# Patient Record
Sex: MALE | Race: WHITE | NOT HISPANIC OR LATINO | Employment: OTHER | URBAN - METROPOLITAN AREA
[De-identification: names, ages, dates, MRNs, and addresses within clinical notes are randomized per-mention and may not be internally consistent; named-entity substitution may affect disease eponyms.]

---

## 2023-03-06 PROBLEM — R26.2 DIFFICULTY IN WALKING: Status: ACTIVE | Noted: 2023-03-06

## 2023-03-06 PROBLEM — M76.70 PERONEAL TENDONITIS: Status: ACTIVE | Noted: 2023-03-06

## 2023-03-06 PROBLEM — M47.816 LUMBAR SPONDYLOSIS: Status: ACTIVE | Noted: 2023-03-06

## 2023-03-06 PROBLEM — E55.9 VITAMIN D DEFICIENCY: Status: ACTIVE | Noted: 2023-03-06

## 2023-03-06 PROBLEM — M25.561 RIGHT KNEE PAIN: Status: ACTIVE | Noted: 2023-03-06

## 2023-03-06 PROBLEM — M79.606 LOWER EXTREMITY PAIN: Status: ACTIVE | Noted: 2023-03-06

## 2023-03-06 PROBLEM — M62.81 MUSCLE WEAKNESS-GENERAL: Status: ACTIVE | Noted: 2023-03-06

## 2023-03-06 PROBLEM — M17.11 PRIMARY LOCALIZED OSTEOARTHRITIS OF RIGHT KNEE: Status: ACTIVE | Noted: 2023-03-06

## 2023-03-06 PROBLEM — N40.0 BPH (BENIGN PROSTATIC HYPERPLASIA): Status: ACTIVE | Noted: 2023-03-06

## 2023-03-06 PROBLEM — I87.2 VENOUS INSUFFICIENCY: Status: ACTIVE | Noted: 2023-03-06

## 2023-03-06 PROBLEM — R26.89 SHUFFLING GAIT: Status: ACTIVE | Noted: 2023-03-06

## 2023-03-06 PROBLEM — K59.09 CONSTIPATION, CHRONIC: Status: ACTIVE | Noted: 2023-03-06

## 2023-03-06 PROBLEM — M21.6X9: Status: ACTIVE | Noted: 2023-03-06

## 2023-03-06 PROBLEM — I10 BENIGN HYPERTENSION: Status: ACTIVE | Noted: 2023-03-06

## 2023-03-06 PROBLEM — I73.9 VASCULAR CLAUDICATION (CMS-HCC): Status: ACTIVE | Noted: 2023-03-06

## 2023-03-06 PROBLEM — M16.11 OSTEOARTHRITIS OF RIGHT HIP: Status: ACTIVE | Noted: 2023-03-06

## 2023-03-06 PROBLEM — G89.29 CHRONIC LOW BACK PAIN: Status: ACTIVE | Noted: 2023-03-06

## 2023-03-06 PROBLEM — H61.22 IMPACTED CERUMEN OF LEFT EAR: Status: ACTIVE | Noted: 2023-03-06

## 2023-03-06 PROBLEM — M51.369 LUMBAR DEGENERATIVE DISC DISEASE: Status: ACTIVE | Noted: 2023-03-06

## 2023-03-06 PROBLEM — R26.89 IMPAIRED GAIT AND MOBILITY: Status: ACTIVE | Noted: 2023-03-06

## 2023-03-06 PROBLEM — R29.3 STOOPED POSTURE: Status: ACTIVE | Noted: 2023-03-06

## 2023-03-06 PROBLEM — M96.1 POSTLAMINECTOMY SYNDROME, LUMBAR REGION: Status: ACTIVE | Noted: 2023-03-06

## 2023-03-06 PROBLEM — J18.9 COMMUNITY ACQUIRED PNEUMONIA: Status: ACTIVE | Noted: 2023-03-06

## 2023-03-06 PROBLEM — R29.898 RIGIDITY (MUSCLES): Status: ACTIVE | Noted: 2023-03-06

## 2023-03-06 PROBLEM — M53.3 SACROILIAC JOINT PAIN: Status: ACTIVE | Noted: 2023-03-06

## 2023-03-06 PROBLEM — R60.0 BILATERAL EDEMA OF LOWER EXTREMITY: Status: ACTIVE | Noted: 2023-03-06

## 2023-03-06 PROBLEM — M25.552 LEFT HIP PAIN: Status: ACTIVE | Noted: 2023-03-06

## 2023-03-06 PROBLEM — G62.9 AXONAL POLYNEUROPATHY: Status: ACTIVE | Noted: 2023-03-06

## 2023-03-06 PROBLEM — M25.551 RIGHT HIP PAIN: Status: ACTIVE | Noted: 2023-03-06

## 2023-03-06 PROBLEM — M67.00 ACQUIRED SHORT ACHILLES TENDON: Status: ACTIVE | Noted: 2023-03-06

## 2023-03-06 PROBLEM — M54.50 CHRONIC LOW BACK PAIN: Status: ACTIVE | Noted: 2023-03-06

## 2023-03-06 PROBLEM — G20.A1 PARKINSON'S DISEASE (MULTI): Status: ACTIVE | Noted: 2023-03-06

## 2023-03-06 PROBLEM — G62.9 PERIPHERAL NEUROPATHY: Status: ACTIVE | Noted: 2023-03-06

## 2023-03-06 PROBLEM — M47.817 LUMBAR AND SACRAL SPONDYLOARTHRITIS: Status: ACTIVE | Noted: 2023-03-06

## 2023-03-06 PROBLEM — M48.062 LUMBAR STENOSIS WITH NEUROGENIC CLAUDICATION: Status: ACTIVE | Noted: 2023-03-06

## 2023-03-06 PROBLEM — M79.18 MYOFASCIAL PAIN: Status: ACTIVE | Noted: 2023-03-06

## 2023-03-06 PROBLEM — M54.16 LUMBAR RADICULOPATHY: Status: ACTIVE | Noted: 2023-03-06

## 2023-03-06 PROBLEM — M17.9 KNEE OSTEOARTHRITIS: Status: ACTIVE | Noted: 2023-03-06

## 2023-03-06 PROBLEM — M21.372 LEFT FOOT DROP: Status: ACTIVE | Noted: 2023-03-06

## 2023-03-06 PROBLEM — M51.36 LUMBAR DEGENERATIVE DISC DISEASE: Status: ACTIVE | Noted: 2023-03-06

## 2023-03-06 PROBLEM — R25.1 TREMOR: Status: ACTIVE | Noted: 2023-03-06

## 2023-03-06 PROBLEM — M25.562 LEFT KNEE PAIN: Status: ACTIVE | Noted: 2023-03-06

## 2023-03-06 PROBLEM — M54.50 LUMBAR PAIN: Status: ACTIVE | Noted: 2023-03-06

## 2023-03-06 PROBLEM — K46.9 ABDOMINAL HERNIA: Status: ACTIVE | Noted: 2023-03-06

## 2023-03-06 PROBLEM — M20.20 HALLUX RIGIDUS: Status: ACTIVE | Noted: 2023-03-06

## 2023-03-06 PROBLEM — R26.81 GAIT INSTABILITY: Status: ACTIVE | Noted: 2023-03-06

## 2023-03-06 PROBLEM — M48.07 LUMBOSACRAL SPINAL STENOSIS: Status: ACTIVE | Noted: 2023-03-06

## 2023-03-06 RX ORDER — CARBIDOPA AND LEVODOPA 25; 100 MG/1; MG/1
2 TABLET ORAL
COMMUNITY
Start: 2020-09-22 | End: 2024-06-10

## 2023-03-06 RX ORDER — BISACODYL 5 MG
TABLET, DELAYED RELEASE (ENTERIC COATED) ORAL
COMMUNITY

## 2023-03-06 RX ORDER — HYDROCHLOROTHIAZIDE 25 MG/1
25 TABLET ORAL DAILY
COMMUNITY
Start: 2019-09-13 | End: 2023-06-20

## 2023-03-06 RX ORDER — AMLODIPINE BESYLATE 5 MG/1
5 TABLET ORAL DAILY
COMMUNITY
Start: 2020-02-25 | End: 2023-06-01

## 2023-03-20 ENCOUNTER — OFFICE VISIT (OUTPATIENT)
Dept: PRIMARY CARE | Facility: CLINIC | Age: 73
End: 2023-03-20
Payer: MEDICARE

## 2023-03-20 VITALS
WEIGHT: 154 LBS | HEART RATE: 75 BPM | DIASTOLIC BLOOD PRESSURE: 66 MMHG | BODY MASS INDEX: 22.81 KG/M2 | HEIGHT: 69 IN | SYSTOLIC BLOOD PRESSURE: 119 MMHG

## 2023-03-20 DIAGNOSIS — G20.A1 PARKINSON'S DISEASE (MULTI): ICD-10-CM

## 2023-03-20 DIAGNOSIS — M62.81 MUSCLE WEAKNESS-GENERAL: ICD-10-CM

## 2023-03-20 DIAGNOSIS — I10 BENIGN HYPERTENSION: ICD-10-CM

## 2023-03-20 DIAGNOSIS — Z11.4 SCREENING FOR HIV WITHOUT PRESENCE OF RISK FACTORS: ICD-10-CM

## 2023-03-20 DIAGNOSIS — Z11.59 NEED FOR HEPATITIS C SCREENING TEST: ICD-10-CM

## 2023-03-20 DIAGNOSIS — Z12.5 SCREENING FOR PROSTATE CANCER: ICD-10-CM

## 2023-03-20 DIAGNOSIS — Z13.1 DIABETES MELLITUS SCREENING: Primary | ICD-10-CM

## 2023-03-20 DIAGNOSIS — Z12.12 SCREENING FOR COLORECTAL CANCER: ICD-10-CM

## 2023-03-20 DIAGNOSIS — Z00.00 ROUTINE GENERAL MEDICAL EXAMINATION AT HEALTH CARE FACILITY: ICD-10-CM

## 2023-03-20 DIAGNOSIS — N40.0 BENIGN PROSTATIC HYPERPLASIA WITHOUT LOWER URINARY TRACT SYMPTOMS: ICD-10-CM

## 2023-03-20 DIAGNOSIS — M96.1 POSTLAMINECTOMY SYNDROME, LUMBAR REGION: ICD-10-CM

## 2023-03-20 DIAGNOSIS — I10 PRIMARY HYPERTENSION: ICD-10-CM

## 2023-03-20 DIAGNOSIS — R97.20 ELEVATED PSA: ICD-10-CM

## 2023-03-20 DIAGNOSIS — Z12.11 SCREENING FOR COLORECTAL CANCER: ICD-10-CM

## 2023-03-20 PROBLEM — M25.552 LEFT HIP PAIN: Status: RESOLVED | Noted: 2023-03-06 | Resolved: 2023-03-20

## 2023-03-20 PROBLEM — J18.9 COMMUNITY ACQUIRED PNEUMONIA: Status: RESOLVED | Noted: 2023-03-06 | Resolved: 2023-03-20

## 2023-03-20 PROBLEM — M53.3 SACROILIAC JOINT PAIN: Status: RESOLVED | Noted: 2023-03-06 | Resolved: 2023-03-20

## 2023-03-20 PROBLEM — M47.816 LUMBAR SPONDYLOSIS: Status: RESOLVED | Noted: 2023-03-06 | Resolved: 2023-03-20

## 2023-03-20 PROBLEM — R26.89 IMPAIRED GAIT AND MOBILITY: Status: RESOLVED | Noted: 2023-03-06 | Resolved: 2023-03-20

## 2023-03-20 PROBLEM — G62.9 PERIPHERAL NEUROPATHY: Status: RESOLVED | Noted: 2023-03-06 | Resolved: 2023-03-20

## 2023-03-20 PROBLEM — M21.6X9: Status: RESOLVED | Noted: 2023-03-06 | Resolved: 2023-03-20

## 2023-03-20 PROBLEM — R29.3 STOOPED POSTURE: Status: RESOLVED | Noted: 2023-03-06 | Resolved: 2023-03-20

## 2023-03-20 PROBLEM — M20.20 HALLUX RIGIDUS: Status: RESOLVED | Noted: 2023-03-06 | Resolved: 2023-03-20

## 2023-03-20 PROBLEM — M54.16 RIGHT LUMBAR RADICULOPATHY: Status: RESOLVED | Noted: 2023-03-06 | Resolved: 2023-03-20

## 2023-03-20 PROBLEM — H61.22 IMPACTED CERUMEN OF LEFT EAR: Status: RESOLVED | Noted: 2023-03-06 | Resolved: 2023-03-20

## 2023-03-20 PROBLEM — R26.2 DIFFICULTY IN WALKING: Status: RESOLVED | Noted: 2023-03-06 | Resolved: 2023-03-20

## 2023-03-20 PROBLEM — M79.18 MYOFASCIAL PAIN: Status: RESOLVED | Noted: 2023-03-06 | Resolved: 2023-03-20

## 2023-03-20 PROBLEM — R29.898 RIGIDITY (MUSCLES): Status: RESOLVED | Noted: 2023-03-06 | Resolved: 2023-03-20

## 2023-03-20 PROBLEM — M16.11 OSTEOARTHRITIS OF RIGHT HIP: Status: RESOLVED | Noted: 2023-03-06 | Resolved: 2023-03-20

## 2023-03-20 PROBLEM — M25.561 RIGHT KNEE PAIN: Status: RESOLVED | Noted: 2023-03-06 | Resolved: 2023-03-20

## 2023-03-20 PROBLEM — M67.00 ACQUIRED SHORT ACHILLES TENDON: Status: RESOLVED | Noted: 2023-03-06 | Resolved: 2023-03-20

## 2023-03-20 PROBLEM — M54.50 LUMBAR PAIN: Status: RESOLVED | Noted: 2023-03-06 | Resolved: 2023-03-20

## 2023-03-20 PROBLEM — M17.11 PRIMARY LOCALIZED OSTEOARTHRITIS OF RIGHT KNEE: Status: RESOLVED | Noted: 2023-03-06 | Resolved: 2023-03-20

## 2023-03-20 PROBLEM — K59.09 CONSTIPATION, CHRONIC: Status: RESOLVED | Noted: 2023-03-06 | Resolved: 2023-03-20

## 2023-03-20 PROBLEM — M51.36 LUMBAR DEGENERATIVE DISC DISEASE: Status: RESOLVED | Noted: 2023-03-06 | Resolved: 2023-03-20

## 2023-03-20 PROBLEM — R60.0 BILATERAL EDEMA OF LOWER EXTREMITY: Status: RESOLVED | Noted: 2023-03-06 | Resolved: 2023-03-20

## 2023-03-20 PROBLEM — E55.9 VITAMIN D DEFICIENCY: Status: RESOLVED | Noted: 2023-03-06 | Resolved: 2023-03-20

## 2023-03-20 PROBLEM — G89.29 CHRONIC LOW BACK PAIN: Status: RESOLVED | Noted: 2023-03-06 | Resolved: 2023-03-20

## 2023-03-20 PROBLEM — I87.2 VENOUS INSUFFICIENCY: Status: RESOLVED | Noted: 2023-03-06 | Resolved: 2023-03-20

## 2023-03-20 PROBLEM — R26.89 SHUFFLING GAIT: Status: RESOLVED | Noted: 2023-03-06 | Resolved: 2023-03-20

## 2023-03-20 PROBLEM — M76.70 PERONEAL TENDONITIS: Status: RESOLVED | Noted: 2023-03-06 | Resolved: 2023-03-20

## 2023-03-20 PROBLEM — M51.369 LUMBAR DEGENERATIVE DISC DISEASE: Status: RESOLVED | Noted: 2023-03-06 | Resolved: 2023-03-20

## 2023-03-20 PROBLEM — R26.81 GAIT INSTABILITY: Status: RESOLVED | Noted: 2023-03-06 | Resolved: 2023-03-20

## 2023-03-20 PROBLEM — M47.817 LUMBAR AND SACRAL SPONDYLOARTHRITIS: Status: RESOLVED | Noted: 2023-03-06 | Resolved: 2023-03-20

## 2023-03-20 PROBLEM — M25.551 RIGHT HIP PAIN: Status: RESOLVED | Noted: 2023-03-06 | Resolved: 2023-03-20

## 2023-03-20 PROBLEM — I73.9 VASCULAR CLAUDICATION (CMS-HCC): Status: RESOLVED | Noted: 2023-03-06 | Resolved: 2023-03-20

## 2023-03-20 PROBLEM — M48.062 LUMBAR STENOSIS WITH NEUROGENIC CLAUDICATION: Status: RESOLVED | Noted: 2023-03-06 | Resolved: 2023-03-20

## 2023-03-20 PROBLEM — M79.606 LOWER EXTREMITY PAIN: Status: RESOLVED | Noted: 2023-03-06 | Resolved: 2023-03-20

## 2023-03-20 PROBLEM — M54.50 CHRONIC LOW BACK PAIN: Status: RESOLVED | Noted: 2023-03-06 | Resolved: 2023-03-20

## 2023-03-20 PROBLEM — M25.562 LEFT KNEE PAIN: Status: RESOLVED | Noted: 2023-03-06 | Resolved: 2023-03-20

## 2023-03-20 PROCEDURE — 1036F TOBACCO NON-USER: CPT | Performed by: INTERNAL MEDICINE

## 2023-03-20 PROCEDURE — G0439 PPPS, SUBSEQ VISIT: HCPCS | Performed by: INTERNAL MEDICINE

## 2023-03-20 PROCEDURE — 99397 PER PM REEVAL EST PAT 65+ YR: CPT | Performed by: INTERNAL MEDICINE

## 2023-03-20 PROCEDURE — 1160F RVW MEDS BY RX/DR IN RCRD: CPT | Performed by: INTERNAL MEDICINE

## 2023-03-20 PROCEDURE — 1170F FXNL STATUS ASSESSED: CPT | Performed by: INTERNAL MEDICINE

## 2023-03-20 PROCEDURE — 1159F MED LIST DOCD IN RCRD: CPT | Performed by: INTERNAL MEDICINE

## 2023-03-20 PROCEDURE — 3074F SYST BP LT 130 MM HG: CPT | Performed by: INTERNAL MEDICINE

## 2023-03-20 PROCEDURE — 3078F DIAST BP <80 MM HG: CPT | Performed by: INTERNAL MEDICINE

## 2023-03-20 ASSESSMENT — ENCOUNTER SYMPTOMS
ARTHRALGIAS: 0
LIGHT-HEADEDNESS: 0
FEVER: 0
AGITATION: 0
EYE PAIN: 0
NERVOUS/ANXIOUS: 0
SHORTNESS OF BREATH: 0
CHOKING: 0
FACIAL ASYMMETRY: 0
MYALGIAS: 1
DIAPHORESIS: 0
ACTIVITY CHANGE: 0
NUMBNESS: 0
NAUSEA: 0
POLYPHAGIA: 0
ABDOMINAL PAIN: 0
FACIAL SWELLING: 0
EYE ITCHING: 0
BLOOD IN STOOL: 0
FATIGUE: 1
DYSPHORIC MOOD: 0
VOMITING: 0
HYPERACTIVE: 0
ABDOMINAL DISTENTION: 0
COUGH: 0
APPETITE CHANGE: 0
JOINT SWELLING: 0
POLYDIPSIA: 0
DIFFICULTY URINATING: 0
CONFUSION: 0
HEADACHES: 0
CHEST TIGHTNESS: 0
BACK PAIN: 1
SPEECH DIFFICULTY: 0
EYE DISCHARGE: 0
EYE REDNESS: 0

## 2023-03-20 ASSESSMENT — ACTIVITIES OF DAILY LIVING (ADL)
DOING_HOUSEWORK: INDEPENDENT
BATHING: INDEPENDENT
MANAGING_FINANCES: INDEPENDENT
GROCERY_SHOPPING: INDEPENDENT
TAKING_MEDICATION: INDEPENDENT
DRESSING: INDEPENDENT

## 2023-03-20 ASSESSMENT — PATIENT HEALTH QUESTIONNAIRE - PHQ9
2. FEELING DOWN, DEPRESSED OR HOPELESS: NOT AT ALL
SUM OF ALL RESPONSES TO PHQ9 QUESTIONS 1 AND 2: 0
1. LITTLE INTEREST OR PLEASURE IN DOING THINGS: NOT AT ALL

## 2023-03-20 NOTE — ASSESSMENT & PLAN NOTE
Stable at this time with no progression.  Hernias is reducible.  Patient is deferring surgical evaluation at this time.  Explained to the patient that should he develop pain in the left inguinal area he should go to the emergency room for evaluation

## 2023-03-20 NOTE — ASSESSMENT & PLAN NOTE
Patient has a complicated physical medicine history including left foot drop, postlaminectomy syndrome, Parkinson disease and follows with both physical medicine and rehabilitation as well as neurology.  From a neurologic standpoint it seems his Parkinson's is controlled however he has a muscle issue and that he does not have enough strength.  He has been referred to physical therapy in the past however despite this he still feels his muscles are weak.  Once again encouraged following up with physical therapy and physical medicine rehabilitation

## 2023-03-20 NOTE — PROGRESS NOTES
Subjective   Reason for Visit: Edilson Cantu is an 72 y.o. male here for a Medicare Wellness visit.               HPI    Patient is a 72-year-old male with past medical history of postlaminectomy syndrome Parkinson disease, hypertension, dyslipidemia who presents for Medicare wellness    Patient lives at home with his wife.  Patient is capable of doing his activities of daily living but relies on his wife due to his Parkinson disease.    Patient follows with neurology quite regularly and he states that for the most part his Parkinson disease is under control however he does have generalized muscle weakness.  He follows with physical medicine and rehabilitation as well as physical therapy.  He walks with a cane for the most part is fully independent.  He has received injections of the bilateral knees however he states that the symptom control is transient and does not last very long    Patient Care Team:  Juliano Weinstein DO as PCP - General  Juliano Weinstein DO as PCP - [employer] PCP     Review of Systems   Constitutional:  Positive for fatigue. Negative for activity change, appetite change, diaphoresis and fever.   HENT:  Negative for dental problem, drooling, ear pain, facial swelling, hearing loss and nosebleeds.    Eyes:  Negative for pain, discharge, redness and itching.   Respiratory:  Negative for cough, choking, chest tightness and shortness of breath.    Gastrointestinal:  Negative for abdominal distention, abdominal pain, blood in stool, nausea and vomiting.   Endocrine: Negative for cold intolerance, heat intolerance, polydipsia and polyphagia.   Genitourinary:  Negative for difficulty urinating.   Musculoskeletal:  Positive for back pain, gait problem and myalgias. Negative for arthralgias and joint swelling.   Allergic/Immunologic: Negative for environmental allergies and food allergies.   Neurological:  Negative for facial asymmetry, speech difficulty, light-headedness, numbness and  "headaches.   Psychiatric/Behavioral:  Negative for agitation, confusion and dysphoric mood. The patient is not nervous/anxious and is not hyperactive.        Objective   Vitals:  /66   Pulse 75   Ht 1.753 m (5' 9\")   Wt 69.9 kg (154 lb)   BMI 22.74 kg/m²       Physical Exam  HENT:      Right Ear: There is impacted cerumen.   Musculoskeletal:      Right lower leg: Edema present.      Left lower leg: Edema present.      Comments: Left foot drop.  4/5 strength in the bilateral lower extremities         Assessment/Plan   Problem List Items Addressed This Visit          Nervous    Parkinson's disease (CMS/HCC)    Current Assessment & Plan     Continue following with neurology.            Circulatory    Benign hypertension    Current Assessment & Plan     Controlled on current medications  No medication adjustments            Genitourinary    BPH (benign prostatic hyperplasia)    Current Assessment & Plan     Stable off medications.  Check PSA            Musculoskeletal    Muscle weakness-general    Current Assessment & Plan     Patient has a complicated physical medicine history including left foot drop, postlaminectomy syndrome, Parkinson disease and follows with both physical medicine and rehabilitation as well as neurology.  From a neurologic standpoint it seems his Parkinson's is controlled however he has a muscle issue and that he does not have enough strength.  He has been referred to physical therapy in the past however despite this he still feels his muscles are weak.  Once again encouraged following up with physical therapy and physical medicine rehabilitation            Other    Postlaminectomy syndrome, lumbar region     Other Visit Diagnoses       Diabetes mellitus screening    -  Primary    Relevant Orders    Comprehensive Metabolic Panel    Elevated PSA        Relevant Orders    Prostate Specific Antigen, Screen    Need for hepatitis C screening test        Relevant Orders    Hepatitis C antibody "    Screening for colorectal cancer        Relevant Orders    Cologuard® colon cancer screening    Screening for HIV without presence of risk factors        Relevant Orders    HIV-1 and HIV-2 antibodies    Screening for prostate cancer        Relevant Orders    Prostate Specific Antigen, Screen    Routine general medical examination at health care facility        Relevant Orders    1 Year Follow Up In [facility] - PCP - Wellness Exam    Lipid Panel    CBC    TSH with reflex to Free T4 if abnormal    Referral to Dermatology    Primary hypertension        Relevant Orders    Lipid Panel    CBC    TSH with reflex to Free T4 if abnormal            Lower extremity swelling  Likely due to the amlodipine  Advise compression stockings.  We will continue current medications as is however if the swelling gets worse will need to stop the amlodipine    Cerumen impaction bilaterally  Advise Debrox 3 drops 3 times a day for 5 days  Consider referral to ENT if no improvement    Elevated  ASCVD risk  Check lipid panel but deferring statin due to concern about myalgias.  If lipid panel is elevated may need to consider alternate options.

## 2023-03-21 ENCOUNTER — LAB (OUTPATIENT)
Dept: LAB | Facility: LAB | Age: 73
End: 2023-03-21
Payer: MEDICARE

## 2023-03-21 DIAGNOSIS — Z12.5 SCREENING FOR PROSTATE CANCER: ICD-10-CM

## 2023-03-21 DIAGNOSIS — Z00.00 ROUTINE GENERAL MEDICAL EXAMINATION AT HEALTH CARE FACILITY: ICD-10-CM

## 2023-03-21 DIAGNOSIS — Z13.1 DIABETES MELLITUS SCREENING: ICD-10-CM

## 2023-03-21 DIAGNOSIS — Z11.4 SCREENING FOR HIV WITHOUT PRESENCE OF RISK FACTORS: ICD-10-CM

## 2023-03-21 DIAGNOSIS — Z11.59 NEED FOR HEPATITIS C SCREENING TEST: ICD-10-CM

## 2023-03-21 DIAGNOSIS — R97.20 ELEVATED PSA: ICD-10-CM

## 2023-03-21 DIAGNOSIS — I10 PRIMARY HYPERTENSION: ICD-10-CM

## 2023-03-21 LAB
ALANINE AMINOTRANSFERASE (SGPT) (U/L) IN SER/PLAS: <3 U/L (ref 10–52)
ALBUMIN (G/DL) IN SER/PLAS: 4.1 G/DL (ref 3.4–5)
ALKALINE PHOSPHATASE (U/L) IN SER/PLAS: 81 U/L (ref 33–136)
ANION GAP IN SER/PLAS: 11 MMOL/L (ref 10–20)
ASPARTATE AMINOTRANSFERASE (SGOT) (U/L) IN SER/PLAS: 13 U/L (ref 9–39)
BILIRUBIN TOTAL (MG/DL) IN SER/PLAS: 0.8 MG/DL (ref 0–1.2)
CALCIUM (MG/DL) IN SER/PLAS: 9.8 MG/DL (ref 8.6–10.6)
CARBON DIOXIDE, TOTAL (MMOL/L) IN SER/PLAS: 33 MMOL/L (ref 21–32)
CHLORIDE (MMOL/L) IN SER/PLAS: 103 MMOL/L (ref 98–107)
CHOLESTEROL (MG/DL) IN SER/PLAS: 200 MG/DL (ref 0–199)
CHOLESTEROL IN HDL (MG/DL) IN SER/PLAS: 77.9 MG/DL
CHOLESTEROL/HDL RATIO: 2.6
CREATININE (MG/DL) IN SER/PLAS: 0.82 MG/DL (ref 0.5–1.3)
ERYTHROCYTE DISTRIBUTION WIDTH (RATIO) BY AUTOMATED COUNT: 12.4 % (ref 11.5–14.5)
ERYTHROCYTE MEAN CORPUSCULAR HEMOGLOBIN CONCENTRATION (G/DL) BY AUTOMATED: 32.6 G/DL (ref 32–36)
ERYTHROCYTE MEAN CORPUSCULAR VOLUME (FL) BY AUTOMATED COUNT: 97 FL (ref 80–100)
ERYTHROCYTES (10*6/UL) IN BLOOD BY AUTOMATED COUNT: 4.45 X10E12/L (ref 4.5–5.9)
GFR MALE: >90 ML/MIN/1.73M2
GLUCOSE (MG/DL) IN SER/PLAS: 85 MG/DL (ref 74–99)
HEMATOCRIT (%) IN BLOOD BY AUTOMATED COUNT: 43.2 % (ref 41–52)
HEMOGLOBIN (G/DL) IN BLOOD: 14.1 G/DL (ref 13.5–17.5)
HEPATITIS C VIRUS AB PRESENCE IN SERUM: NONREACTIVE
HIV 1/ 2 AG/AB SCREEN: NONREACTIVE
LDL: 114 MG/DL (ref 0–99)
LEUKOCYTES (10*3/UL) IN BLOOD BY AUTOMATED COUNT: 4.6 X10E9/L (ref 4.4–11.3)
NRBC (PER 100 WBCS) BY AUTOMATED COUNT: 0 /100 WBC (ref 0–0)
PLATELETS (10*3/UL) IN BLOOD AUTOMATED COUNT: 222 X10E9/L (ref 150–450)
POTASSIUM (MMOL/L) IN SER/PLAS: 4.2 MMOL/L (ref 3.5–5.3)
PROSTATE SPECIFIC ANTIGEN,SCREEN: 0.84 NG/ML (ref 0–4)
PROTEIN TOTAL: 6.4 G/DL (ref 6.4–8.2)
SODIUM (MMOL/L) IN SER/PLAS: 143 MMOL/L (ref 136–145)
THYROTROPIN (MIU/L) IN SER/PLAS BY DETECTION LIMIT <= 0.05 MIU/L: 1.92 MIU/L (ref 0.44–3.98)
TRIGLYCERIDE (MG/DL) IN SER/PLAS: 40 MG/DL (ref 0–149)
UREA NITROGEN (MG/DL) IN SER/PLAS: 13 MG/DL (ref 6–23)
VLDL: 8 MG/DL (ref 0–40)

## 2023-03-21 PROCEDURE — 80053 COMPREHEN METABOLIC PANEL: CPT

## 2023-03-21 PROCEDURE — 87389 HIV-1 AG W/HIV-1&-2 AB AG IA: CPT

## 2023-03-21 PROCEDURE — 80061 LIPID PANEL: CPT

## 2023-03-21 PROCEDURE — 36415 COLL VENOUS BLD VENIPUNCTURE: CPT

## 2023-03-21 PROCEDURE — 84153 ASSAY OF PSA TOTAL: CPT

## 2023-03-21 PROCEDURE — 86803 HEPATITIS C AB TEST: CPT

## 2023-03-21 PROCEDURE — 84443 ASSAY THYROID STIM HORMONE: CPT

## 2023-03-21 PROCEDURE — 85027 COMPLETE CBC AUTOMATED: CPT

## 2023-04-04 LAB — NONINV COLON CA DNA+OCC BLD SCRN STL QL: NORMAL

## 2023-04-25 LAB — NONINV COLON CA DNA+OCC BLD SCRN STL QL: NEGATIVE

## 2023-05-31 DIAGNOSIS — I10 ESSENTIAL (PRIMARY) HYPERTENSION: ICD-10-CM

## 2023-06-01 RX ORDER — AMLODIPINE BESYLATE 5 MG/1
TABLET ORAL
Qty: 90 TABLET | Refills: 1 | Status: SHIPPED | OUTPATIENT
Start: 2023-06-01 | End: 2023-11-27

## 2023-06-20 DIAGNOSIS — I10 ESSENTIAL (PRIMARY) HYPERTENSION: ICD-10-CM

## 2023-06-20 RX ORDER — HYDROCHLOROTHIAZIDE 25 MG/1
25 TABLET ORAL DAILY
Qty: 90 TABLET | Refills: 2 | Status: SHIPPED | OUTPATIENT
Start: 2023-06-20 | End: 2024-03-18

## 2023-09-30 ENCOUNTER — ANESTHESIA EVENT (OUTPATIENT)
Dept: OPERATING ROOM | Facility: HOSPITAL | Age: 73
DRG: 470 | End: 2023-09-30
Payer: MEDICARE

## 2023-09-30 RX ORDER — ONDANSETRON HYDROCHLORIDE 2 MG/ML
4 INJECTION, SOLUTION INTRAVENOUS ONCE AS NEEDED
Status: CANCELLED | OUTPATIENT
Start: 2023-09-30

## 2023-09-30 RX ORDER — ONDANSETRON HYDROCHLORIDE 2 MG/ML
8 INJECTION, SOLUTION INTRAVENOUS ONCE
Status: CANCELLED | OUTPATIENT
Start: 2023-09-30 | End: 2023-09-30

## 2023-09-30 RX ORDER — LIDOCAINE HYDROCHLORIDE 10 MG/ML
0.1 INJECTION, SOLUTION EPIDURAL; INFILTRATION; INTRACAUDAL; PERINEURAL ONCE
Status: CANCELLED | OUTPATIENT
Start: 2023-09-30 | End: 2023-09-30

## 2023-09-30 RX ORDER — ACETAMINOPHEN 325 MG/1
650 TABLET ORAL EVERY 4 HOURS PRN
Status: CANCELLED | OUTPATIENT
Start: 2023-09-30

## 2023-09-30 RX ORDER — SODIUM CHLORIDE, SODIUM LACTATE, POTASSIUM CHLORIDE, CALCIUM CHLORIDE 600; 310; 30; 20 MG/100ML; MG/100ML; MG/100ML; MG/100ML
100 INJECTION, SOLUTION INTRAVENOUS CONTINUOUS
Status: CANCELLED | OUTPATIENT
Start: 2023-09-30

## 2023-09-30 RX ORDER — ALBUTEROL SULFATE 0.83 MG/ML
2.5 SOLUTION RESPIRATORY (INHALATION) ONCE AS NEEDED
Status: CANCELLED | OUTPATIENT
Start: 2023-09-30

## 2023-09-30 RX ORDER — ONDANSETRON HYDROCHLORIDE 2 MG/ML
8 INJECTION, SOLUTION INTRAVENOUS ONCE AS NEEDED
Status: CANCELLED | OUTPATIENT
Start: 2023-09-30

## 2023-09-30 RX ORDER — DROPERIDOL 2.5 MG/ML
0.62 INJECTION, SOLUTION INTRAMUSCULAR; INTRAVENOUS EVERY 6 HOURS PRN
Status: CANCELLED | OUTPATIENT
Start: 2023-09-30

## 2023-09-30 RX ORDER — DROPERIDOL 2.5 MG/ML
0.62 INJECTION, SOLUTION INTRAMUSCULAR; INTRAVENOUS ONCE
Status: CANCELLED | OUTPATIENT
Start: 2023-09-30

## 2023-09-30 RX ORDER — DROPERIDOL 2.5 MG/ML
0.62 INJECTION, SOLUTION INTRAMUSCULAR; INTRAVENOUS ONCE AS NEEDED
Status: CANCELLED | OUTPATIENT
Start: 2023-09-30

## 2023-09-30 NOTE — H&P (VIEW-ONLY)
History of Present Illness:   Admission Reason: OA   HPI:    JAC RODRIGUEZ is a 73 year old Male  referred to PAT by  anticipating a R TKA     Surgery Date: 10/2/23    Patient with history of R knee pain for 5 years and has progressively worsens. failed conservative treatments..   Patient reports pain in PAT 7/10.     Medical History  OA  parkinson's disease  L foot drop   axonal polyneuropathy   venous insufficiency   postlaminectomy syndrome  lumbar stenosis and claudication   HTN  lumbar back pain     Surgical History  knee arthroscopy  lumbar laminectomy    anesthetic complications: none    hx of blood transfusions: no     Accept blood products: yes    Sleep apnea: no    history of DVT or PE: no  dental issues: no  colonoscopy: cologuard       Comorbidities:   Comorbidites:  ·  Comorbid Conditions hypertension     Family History:   Bleeding Disorder: no   CAD: yes  father   PVD: no   Stroke: no   VTE: no     Social History:   Social History:  Smoking Status never smoker   Alcohol Use occasionally   Drug Use denies   Social History    lives with wife              Allergies:  ·  No Known Allergies :     Medications Prior to Admission:   amlodipine 5mg 1 tab PO daily   carbidopa-levodopa  25-100mg 2 tabs PO Q3hrs daily  diclofenac sodium cream as needed   HCTZ 25mg 1 tab PO daily   aleve 3 pills nightly.    Review of Systems:   Eyes: COMMENTS: wears glasses     Musculoskeletal: COMMENTS: see HPI     All Other Systems: All other systems reviewed and  are negative     Objective:     Objective Information:        T   P  R  BP   MAP  SpO2   Value  36.5  60  18  105/67      98%  Date/Time 9/15 13:30 9/15 13:30 9/15 13:30 9/15 13:30    9/15 13:30  Range  (36.5C - 36.5C )  (60 - 60 )  (18 - 18 )  (105 - 105 )/ (67 - 67 )    (98% - 98% )         Weights   9/15 13:30: Weight in kg (Weight (kg))  70.3  9/15 13:30: Weight in lbs ((lbs))  154.9  9/15 13:30: BMI (kg/m2) (BMI (kg/m2))  22.902    Physical Exam by  System:    Constitutional: Well developed, awake/alert/oriented  x3, no distress, alert and cooperative   Eyes: PERRL, EOMI, clear sclera   ENMT: mucous membranes moist, no apparent injury,  no lesions seen   Head/Neck: Neck supple, no apparent injury, thyroid  without mass or tenderness, No JVD, trachea midline, no bruits   Respiratory/Thorax: Patent airways, CTAB, normal  breath sounds with good chest expansion, thorax symmetric   Cardiovascular: Regular, rate and rhythm, no murmurs,  2+ equal pulses of the extremities, normal S 1and S 2   Gastrointestinal: Nondistended, soft, non-tender,  no rebound tenderness or guarding, no masses palpable, no organomegaly, +BS, no bruits   Musculoskeletal: unsteady gait  ambulates with walker  R knee: pain with ROM. good ROM. mild swelling.  slightly diminished L foot pronation.   Extremities: no edema   Neurological: alert and oriented x3, intact senses   Lymphatic: No significant lymphadenopathy   Psychological: Appropriate mood and behavior   Skin: Warm and dry, no lesions, no rashes     Airway:  ·  Mouth Opening OK yes   ·  Neck Flexibility OK yes   ·  Loose Teeth no   ·  Snoring Hx/Sleep Apnea no   ·  NSAID/Aspirin Use yes     Medications:    Medications:    amlodipine 5mg 1 tab PO daily   carbidopa-levodopa  25-100mg 2 tabs PO Q3hrs daily  diclofenac sodium cream as needed   HCTZ 25mg 1 tab PO daily   aleve 3 pills nightly     Recent Lab Results:    Results:        I have reviewed these laboratory results:    Complete Blood Count + Differential  15-Sep-2023 13:40:00      Result Value    White Blood Cell Count  4.5    Red Blood Cell Count  4.15   L   HGB  13.1   L   HCT  39.9   L   MCV  96    MCHC  32.8    PLT  226    RDW-CV  12.1    Neutrophil %  74.0    Immature Granulocytes %  0.2    Lymphocyte %  17.6    Monocyte %  7.6    Eosinophil %  0.4    Basophil %  0.2    Neutrophil Count  3.33    Lymphocyte Count  0.79   L   Monocyte Count  0.34    Eosinophil Count  0.02     Basophil Count  0.01      Comprehensive Metabolic Panel  15-Sep-2023 13:40:00      Result Value    Glucose, Serum  88    NA  137    K  3.5    CL  102    Bicarbonate, Serum  27    Anion Gap, Serum  12    BUN  13    CREAT  0.77    GFR Male  >90    Calcium, Serum  9.2    ALB  4.0    ALKP  103    T Pro  6.4    T Bili  0.8    Alanine Aminotransferase, Serum  <3   L   Aspartate Transaminase, Serum  11      Urinalysis with Culture if Indicated  15-Sep-2023 12:52:00      Result Value    Color, Urine  YELLOW  Reference Range: STRAW,YELLOW    Appearance, Urine  CLEAR    Specific Gravity, Urine  1.017    pH, Urine  5.0    Protein, Urine  NEGATIVE    Glucose, Urine  NEGATIVE    Blood, Urine  NEGATIVE    Ketones, Urine  5(Trace)   A   Bilirubin, Urine  NEGATIVE    Urobilinogen, Urine  <2.0    Nitrite, Urine  NEGATIVE    Leukocyte Esterase, Urine  NEGATIVE      MRSA Screen  15-Sep-2023 12:52:00      Result Value    MRSA Screen  NO Staphylococcus aureus ISOLATED.         Radiology Results:    Results:    Impression:    1. Severelateral compartment degenerative changes of the bilateral knees with valgus angulation.     Xray Knee 1 or 2 View [Mar 31 2023  6:51PM]      Assessment and Plan:   Assessment:    MICHAEL JAC BLANDON is a 73 year old Male  referred to PAT by  anticipating a R TKA     Surgery Date: 10/2/23    PMH significant for:  OA  parkinson's disease  L foot drop   axonal polyneuropathy   venous insufficiency   postlaminectomy syndrome  lumbar stenosis and claudication   HTN  lumbar back pain     ASA class: II    EK/15/23 NSR    clearances: none required     Pt verbalized understanding of preop instructions given in PAT        Electronic Signatures:  RoyalRaven Villalobos (RAF)  (Signed 18-Sep-2023 06:47)   Authored: History of Present Illness, Comorbidities,  Family History, Social History, Allergies, Medications Prior to Admission, Review of Systems, Objective, Assessment and Plan, Note Completion      Last  Updated: 18-Sep-2023 06:47 by LeleRaven Villalobos (PAC)

## 2023-10-01 PROBLEM — M17.11 OSTEOARTHRITIS OF RIGHT KNEE, UNSPECIFIED OSTEOARTHRITIS TYPE: Status: ACTIVE | Noted: 2023-10-01

## 2023-10-02 ENCOUNTER — PREP FOR PROCEDURE (OUTPATIENT)
Dept: ORTHOPEDICS | Facility: HOSPITAL | Age: 73
End: 2023-10-02
Payer: MEDICARE

## 2023-10-02 ENCOUNTER — APPOINTMENT (OUTPATIENT)
Dept: RADIOLOGY | Facility: HOSPITAL | Age: 73
End: 2023-10-02
Payer: MEDICARE

## 2023-10-02 ENCOUNTER — HOSPITAL ENCOUNTER (OUTPATIENT)
Facility: HOSPITAL | Age: 73
Setting detail: OBSERVATION
Discharge: HOME | DRG: 470 | End: 2023-10-03
Attending: ORTHOPAEDIC SURGERY | Admitting: ORTHOPAEDIC SURGERY
Payer: MEDICARE

## 2023-10-02 ENCOUNTER — ANESTHESIA (OUTPATIENT)
Dept: OPERATING ROOM | Facility: HOSPITAL | Age: 73
DRG: 470 | End: 2023-10-02
Payer: MEDICARE

## 2023-10-02 DIAGNOSIS — G89.18 POST-OPERATIVE PAIN: Primary | ICD-10-CM

## 2023-10-02 DIAGNOSIS — M17.11 OSTEOARTHRITIS OF RIGHT KNEE, UNSPECIFIED OSTEOARTHRITIS TYPE: ICD-10-CM

## 2023-10-02 PROBLEM — R94.2 DECREASED FUNCTIONAL RESIDUAL CAPACITY: Status: ACTIVE | Noted: 2023-10-02

## 2023-10-02 PROBLEM — I67.1 CEREBRAL ANEURYSM (HHS-HCC): Status: ACTIVE | Noted: 2023-10-02

## 2023-10-02 PROBLEM — M19.90 ARTHRITIS: Status: ACTIVE | Noted: 2023-03-06

## 2023-10-02 PROBLEM — I10 BENIGN HYPERTENSION: Status: RESOLVED | Noted: 2023-03-06 | Resolved: 2023-10-02

## 2023-10-02 PROBLEM — F32.A DEPRESSION: Status: ACTIVE | Noted: 2023-10-02

## 2023-10-02 PROCEDURE — 2500000005 HC RX 250 GENERAL PHARMACY W/O HCPCS: Performed by: NURSE ANESTHETIST, CERTIFIED REGISTERED

## 2023-10-02 PROCEDURE — 88311 DECALCIFY TISSUE: CPT | Performed by: PATHOLOGY

## 2023-10-02 PROCEDURE — G0378 HOSPITAL OBSERVATION PER HR: HCPCS

## 2023-10-02 PROCEDURE — 1100000001 HC PRIVATE ROOM DAILY

## 2023-10-02 PROCEDURE — 76942 ECHO GUIDE FOR BIOPSY: CPT | Performed by: ANESTHESIOLOGY

## 2023-10-02 PROCEDURE — 96376 TX/PRO/DX INJ SAME DRUG ADON: CPT | Mod: 59

## 2023-10-02 PROCEDURE — 2500000001 HC RX 250 WO HCPCS SELF ADMINISTERED DRUGS (ALT 637 FOR MEDICARE OP): Performed by: STUDENT IN AN ORGANIZED HEALTH CARE EDUCATION/TRAINING PROGRAM

## 2023-10-02 PROCEDURE — 2500000004 HC RX 250 GENERAL PHARMACY W/ HCPCS (ALT 636 FOR OP/ED): Performed by: NURSE ANESTHETIST, CERTIFIED REGISTERED

## 2023-10-02 PROCEDURE — 73560 X-RAY EXAM OF KNEE 1 OR 2: CPT | Mod: RIGHT SIDE | Performed by: RADIOLOGY

## 2023-10-02 PROCEDURE — 2580000001 HC RX 258 IV SOLUTIONS: Performed by: NURSE ANESTHETIST, CERTIFIED REGISTERED

## 2023-10-02 PROCEDURE — 97161 PT EVAL LOW COMPLEX 20 MIN: CPT | Mod: GP

## 2023-10-02 PROCEDURE — 3600000017 HC OR TIME - EACH INCREMENTAL 1 MINUTE - PROCEDURE LEVEL SIX: Performed by: ORTHOPAEDIC SURGERY

## 2023-10-02 PROCEDURE — 96361 HYDRATE IV INFUSION ADD-ON: CPT | Mod: 59

## 2023-10-02 PROCEDURE — 73560 X-RAY EXAM OF KNEE 1 OR 2: CPT | Mod: RT,FY

## 2023-10-02 PROCEDURE — 2500000004 HC RX 250 GENERAL PHARMACY W/ HCPCS (ALT 636 FOR OP/ED): Performed by: ANESTHESIOLOGY

## 2023-10-02 PROCEDURE — 27447 TOTAL KNEE ARTHROPLASTY: CPT | Performed by: ORTHOPAEDIC SURGERY

## 2023-10-02 PROCEDURE — 2720000007 HC OR 272 NO HCPCS: Performed by: ORTHOPAEDIC SURGERY

## 2023-10-02 PROCEDURE — 3600000018 HC OR TIME - INITIAL BASE CHARGE - PROCEDURE LEVEL SIX: Performed by: ORTHOPAEDIC SURGERY

## 2023-10-02 PROCEDURE — 7100000001 HC RECOVERY ROOM TIME - INITIAL BASE CHARGE: Performed by: ORTHOPAEDIC SURGERY

## 2023-10-02 PROCEDURE — 88311 DECALCIFY TISSUE: CPT | Mod: TC,GEALAB | Performed by: ORTHOPAEDIC SURGERY

## 2023-10-02 PROCEDURE — 2780000003 HC OR 278 NO HCPCS: Performed by: ORTHOPAEDIC SURGERY

## 2023-10-02 PROCEDURE — 99252 IP/OBS CONSLTJ NEW/EST SF 35: CPT | Performed by: INTERNAL MEDICINE

## 2023-10-02 PROCEDURE — 7100000002 HC RECOVERY ROOM TIME - EACH INCREMENTAL 1 MINUTE: Performed by: ORTHOPAEDIC SURGERY

## 2023-10-02 PROCEDURE — 3700000001 HC GENERAL ANESTHESIA TIME - INITIAL BASE CHARGE: Performed by: ORTHOPAEDIC SURGERY

## 2023-10-02 PROCEDURE — 2500000004 HC RX 250 GENERAL PHARMACY W/ HCPCS (ALT 636 FOR OP/ED): Performed by: ORTHOPAEDIC SURGERY

## 2023-10-02 PROCEDURE — 3700000002 HC GENERAL ANESTHESIA TIME - EACH INCREMENTAL 1 MINUTE: Performed by: ORTHOPAEDIC SURGERY

## 2023-10-02 PROCEDURE — 97110 THERAPEUTIC EXERCISES: CPT | Mod: GP

## 2023-10-02 PROCEDURE — 88305 TISSUE EXAM BY PATHOLOGIST: CPT | Performed by: PATHOLOGY

## 2023-10-02 PROCEDURE — C1776 JOINT DEVICE (IMPLANTABLE): HCPCS | Performed by: ORTHOPAEDIC SURGERY

## 2023-10-02 PROCEDURE — A4216 STERILE WATER/SALINE, 10 ML: HCPCS | Performed by: ORTHOPAEDIC SURGERY

## 2023-10-02 PROCEDURE — 96375 TX/PRO/DX INJ NEW DRUG ADDON: CPT | Mod: 59

## 2023-10-02 PROCEDURE — A9999 DME SUPPLY OR ACCESSORY, NOS: HCPCS | Performed by: ORTHOPAEDIC SURGERY

## 2023-10-02 PROCEDURE — 97116 GAIT TRAINING THERAPY: CPT | Mod: GP

## 2023-10-02 PROCEDURE — 2500000004 HC RX 250 GENERAL PHARMACY W/ HCPCS (ALT 636 FOR OP/ED): Performed by: STUDENT IN AN ORGANIZED HEALTH CARE EDUCATION/TRAINING PROGRAM

## 2023-10-02 PROCEDURE — 7100000011 HC EXTENDED STAY RECOVERY HOURLY - NURSING UNIT

## 2023-10-02 PROCEDURE — 96365 THER/PROPH/DIAG IV INF INIT: CPT | Mod: 59

## 2023-10-02 DEVICE — IMPLANTABLE DEVICE: Type: IMPLANTABLE DEVICE | Site: KNEE | Status: FUNCTIONAL

## 2023-10-02 RX ORDER — POLYETHYLENE GLYCOL 3350 17 G/17G
17 POWDER, FOR SOLUTION ORAL DAILY
Status: DISCONTINUED | OUTPATIENT
Start: 2023-10-02 | End: 2023-10-03 | Stop reason: HOSPADM

## 2023-10-02 RX ORDER — HYDROCHLOROTHIAZIDE 25 MG/1
25 TABLET ORAL DAILY
Status: DISCONTINUED | OUTPATIENT
Start: 2023-10-02 | End: 2023-10-03 | Stop reason: HOSPADM

## 2023-10-02 RX ORDER — LIDOCAINE HYDROCHLORIDE 10 MG/ML
INJECTION, SOLUTION EPIDURAL; INFILTRATION; INTRACAUDAL; PERINEURAL AS NEEDED
Status: DISCONTINUED | OUTPATIENT
Start: 2023-10-02 | End: 2023-10-02

## 2023-10-02 RX ORDER — CEFAZOLIN 1 G/1
INJECTION, POWDER, FOR SOLUTION INTRAVENOUS AS NEEDED
Status: DISCONTINUED | OUTPATIENT
Start: 2023-10-02 | End: 2023-10-02

## 2023-10-02 RX ORDER — ACETAMINOPHEN 325 MG/1
975 TABLET ORAL ONCE
Status: COMPLETED | OUTPATIENT
Start: 2023-10-02 | End: 2023-10-02

## 2023-10-02 RX ORDER — OXYCODONE HYDROCHLORIDE 10 MG/1
10 TABLET ORAL EVERY 4 HOURS PRN
Status: DISCONTINUED | OUTPATIENT
Start: 2023-10-02 | End: 2023-10-03 | Stop reason: HOSPADM

## 2023-10-02 RX ORDER — ASPIRIN 81 MG/1
81 TABLET ORAL 2 TIMES DAILY
Status: DISCONTINUED | OUTPATIENT
Start: 2023-10-02 | End: 2023-10-03 | Stop reason: HOSPADM

## 2023-10-02 RX ORDER — TRANEXAMIC ACID 10 MG/ML
INJECTION, SOLUTION INTRAVENOUS AS NEEDED
Status: DISCONTINUED | OUTPATIENT
Start: 2023-10-02 | End: 2023-10-02

## 2023-10-02 RX ORDER — ROPIVACAINE HYDROCHLORIDE 5 MG/ML
INJECTION, SOLUTION EPIDURAL; INFILTRATION; PERINEURAL AS NEEDED
Status: DISCONTINUED | OUTPATIENT
Start: 2023-10-02 | End: 2023-10-02 | Stop reason: HOSPADM

## 2023-10-02 RX ORDER — OXYCODONE AND ACETAMINOPHEN 5; 325 MG/1; MG/1
1 TABLET ORAL EVERY 4 HOURS PRN
Qty: 36 TABLET | Refills: 0 | Status: SHIPPED | OUTPATIENT
Start: 2023-10-02 | End: 2023-10-09

## 2023-10-02 RX ORDER — CELECOXIB 400 MG/1
400 CAPSULE ORAL ONCE
Status: COMPLETED | OUTPATIENT
Start: 2023-10-02 | End: 2023-10-02

## 2023-10-02 RX ORDER — ROCURONIUM BROMIDE 10 MG/ML
INJECTION, SOLUTION INTRAVENOUS AS NEEDED
Status: DISCONTINUED | OUTPATIENT
Start: 2023-10-02 | End: 2023-10-02

## 2023-10-02 RX ORDER — PHENYLEPHRINE HCL IN 0.9% NACL 0.4MG/10ML
SYRINGE (ML) INTRAVENOUS AS NEEDED
Status: DISCONTINUED | OUTPATIENT
Start: 2023-10-02 | End: 2023-10-02

## 2023-10-02 RX ORDER — EPINEPHRINE 1 MG/ML
INJECTION INTRAMUSCULAR; INTRAVENOUS; SUBCUTANEOUS AS NEEDED
Status: DISCONTINUED | OUTPATIENT
Start: 2023-10-02 | End: 2023-10-02 | Stop reason: HOSPADM

## 2023-10-02 RX ORDER — MORPHINE SULFATE 0.5 MG/ML
INJECTION, SOLUTION EPIDURAL; INTRATHECAL; INTRAVENOUS AS NEEDED
Status: DISCONTINUED | OUTPATIENT
Start: 2023-10-02 | End: 2023-10-02 | Stop reason: HOSPADM

## 2023-10-02 RX ORDER — BISACODYL 5 MG
10 TABLET, DELAYED RELEASE (ENTERIC COATED) ORAL DAILY PRN
Status: DISCONTINUED | OUTPATIENT
Start: 2023-10-02 | End: 2023-10-03 | Stop reason: HOSPADM

## 2023-10-02 RX ORDER — DOCUSATE SODIUM 100 MG/1
100 CAPSULE, LIQUID FILLED ORAL 2 TIMES DAILY
Status: DISCONTINUED | OUTPATIENT
Start: 2023-10-02 | End: 2023-10-03 | Stop reason: HOSPADM

## 2023-10-02 RX ORDER — KETOROLAC TROMETHAMINE 15 MG/ML
15 INJECTION, SOLUTION INTRAMUSCULAR; INTRAVENOUS EVERY 6 HOURS
Status: COMPLETED | OUTPATIENT
Start: 2023-10-02 | End: 2023-10-03

## 2023-10-02 RX ORDER — NALOXONE HYDROCHLORIDE 0.4 MG/ML
0.2 INJECTION, SOLUTION INTRAMUSCULAR; INTRAVENOUS; SUBCUTANEOUS EVERY 5 MIN PRN
Status: DISCONTINUED | OUTPATIENT
Start: 2023-10-02 | End: 2023-10-03 | Stop reason: HOSPADM

## 2023-10-02 RX ORDER — OXYCODONE HYDROCHLORIDE 5 MG/1
5 TABLET ORAL EVERY 6 HOURS PRN
Status: DISCONTINUED | OUTPATIENT
Start: 2023-10-02 | End: 2023-10-03 | Stop reason: HOSPADM

## 2023-10-02 RX ORDER — HYDROMORPHONE HYDROCHLORIDE 2 MG/ML
INJECTION, SOLUTION INTRAMUSCULAR; INTRAVENOUS; SUBCUTANEOUS AS NEEDED
Status: DISCONTINUED | OUTPATIENT
Start: 2023-10-02 | End: 2023-10-02

## 2023-10-02 RX ORDER — CEFAZOLIN SODIUM 2 G/100ML
2 INJECTION, SOLUTION INTRAVENOUS EVERY 8 HOURS
Status: COMPLETED | OUTPATIENT
Start: 2023-10-02 | End: 2023-10-03

## 2023-10-02 RX ORDER — NAPROXEN 500 MG/1
500 TABLET ORAL
Qty: 20 TABLET | Refills: 0 | Status: SHIPPED | OUTPATIENT
Start: 2023-10-02 | End: 2023-10-12

## 2023-10-02 RX ORDER — CEFAZOLIN SODIUM 2 G/100ML
INJECTION, SOLUTION INTRAVENOUS AS NEEDED
Status: DISCONTINUED | OUTPATIENT
Start: 2023-10-02 | End: 2023-10-02

## 2023-10-02 RX ORDER — AMLODIPINE BESYLATE 5 MG/1
5 TABLET ORAL DAILY
Status: DISCONTINUED | OUTPATIENT
Start: 2023-10-02 | End: 2023-10-03 | Stop reason: HOSPADM

## 2023-10-02 RX ORDER — SODIUM CHLORIDE, SODIUM LACTATE, POTASSIUM CHLORIDE, CALCIUM CHLORIDE 600; 310; 30; 20 MG/100ML; MG/100ML; MG/100ML; MG/100ML
100 INJECTION, SOLUTION INTRAVENOUS CONTINUOUS
Status: DISCONTINUED | OUTPATIENT
Start: 2023-10-02 | End: 2023-10-02

## 2023-10-02 RX ORDER — MIDAZOLAM HYDROCHLORIDE 1 MG/ML
INJECTION, SOLUTION INTRAMUSCULAR; INTRAVENOUS AS NEEDED
Status: DISCONTINUED | OUTPATIENT
Start: 2023-10-02 | End: 2023-10-02

## 2023-10-02 RX ORDER — KETOROLAC TROMETHAMINE 30 MG/ML
INJECTION, SOLUTION INTRAMUSCULAR; INTRAVENOUS AS NEEDED
Status: DISCONTINUED | OUTPATIENT
Start: 2023-10-02 | End: 2023-10-02 | Stop reason: HOSPADM

## 2023-10-02 RX ORDER — ACETAMINOPHEN 500 MG
1000 TABLET ORAL EVERY 6 HOURS PRN
Qty: 60 TABLET | Refills: 0 | Status: SHIPPED | OUTPATIENT
Start: 2023-10-02 | End: 2023-11-01

## 2023-10-02 RX ORDER — SODIUM CHLORIDE 9 MG/ML
INJECTION, SOLUTION INTRAMUSCULAR; INTRAVENOUS; SUBCUTANEOUS AS NEEDED
Status: DISCONTINUED | OUTPATIENT
Start: 2023-10-02 | End: 2023-10-02 | Stop reason: HOSPADM

## 2023-10-02 RX ORDER — NAPROXEN SODIUM 220 MG/1
81 TABLET, FILM COATED ORAL 2 TIMES DAILY
Qty: 60 TABLET | Refills: 0 | Status: SHIPPED | OUTPATIENT
Start: 2023-10-02 | End: 2023-11-01

## 2023-10-02 RX ORDER — CARBIDOPA AND LEVODOPA 25; 100 MG/1; MG/1
2 TABLET ORAL 4 TIMES DAILY
Status: DISCONTINUED | OUTPATIENT
Start: 2023-10-02 | End: 2023-10-03 | Stop reason: HOSPADM

## 2023-10-02 RX ORDER — FENTANYL CITRATE 50 UG/ML
INJECTION, SOLUTION INTRAMUSCULAR; INTRAVENOUS AS NEEDED
Status: DISCONTINUED | OUTPATIENT
Start: 2023-10-02 | End: 2023-10-02

## 2023-10-02 RX ORDER — DEXMEDETOMIDINE HYDROCHLORIDE 4 UG/ML
INJECTION, SOLUTION INTRAVENOUS CONTINUOUS PRN
Status: DISCONTINUED | OUTPATIENT
Start: 2023-10-02 | End: 2023-10-02

## 2023-10-02 RX ORDER — OXYCODONE HYDROCHLORIDE 5 MG/1
2.5 TABLET ORAL EVERY 4 HOURS PRN
Status: DISCONTINUED | OUTPATIENT
Start: 2023-10-02 | End: 2023-10-03 | Stop reason: HOSPADM

## 2023-10-02 RX ORDER — PROPOFOL 10 MG/ML
INJECTION, EMULSION INTRAVENOUS AS NEEDED
Status: DISCONTINUED | OUTPATIENT
Start: 2023-10-02 | End: 2023-10-02

## 2023-10-02 RX ADMIN — SUGAMMADEX 200 MG: 100 INJECTION, SOLUTION INTRAVENOUS at 11:24

## 2023-10-02 RX ADMIN — PROPOFOL 150 MG: 10 INJECTION, EMULSION INTRAVENOUS at 09:56

## 2023-10-02 RX ADMIN — CEFAZOLIN SODIUM 2 G: 2 INJECTION, SOLUTION INTRAVENOUS at 17:09

## 2023-10-02 RX ADMIN — MIDAZOLAM 1 MG: 1 INJECTION INTRAMUSCULAR; INTRAVENOUS at 09:39

## 2023-10-02 RX ADMIN — SODIUM CHLORIDE, SODIUM LACTATE, POTASSIUM CHLORIDE, AND CALCIUM CHLORIDE: 600; 310; 30; 20 INJECTION, SOLUTION INTRAVENOUS at 11:00

## 2023-10-02 RX ADMIN — FENTANYL CITRATE 50 MCG: 50 INJECTION, SOLUTION INTRAMUSCULAR; INTRAVENOUS at 10:21

## 2023-10-02 RX ADMIN — HYDROCHLOROTHIAZIDE 25 MG: 25 TABLET ORAL at 17:09

## 2023-10-02 RX ADMIN — ROCURONIUM BROMIDE 40 MG: 10 INJECTION, SOLUTION INTRAVENOUS at 09:57

## 2023-10-02 RX ADMIN — HYDROMORPHONE HYDROCHLORIDE 1 MG: 2 INJECTION, SOLUTION INTRAMUSCULAR; INTRAVENOUS; SUBCUTANEOUS at 10:39

## 2023-10-02 RX ADMIN — TRANEXAMIC ACID 1000 MG: 10 INJECTION, SOLUTION INTRAVENOUS at 11:12

## 2023-10-02 RX ADMIN — Medication 80 MCG: at 10:56

## 2023-10-02 RX ADMIN — KETOROLAC TROMETHAMINE 15 MG: 15 INJECTION, SOLUTION INTRAMUSCULAR; INTRAVENOUS at 17:09

## 2023-10-02 RX ADMIN — CEFAZOLIN SODIUM 2 G: 2 INJECTION, SOLUTION INTRAVENOUS at 09:51

## 2023-10-02 RX ADMIN — CEFAZOLIN 2 G: 1 INJECTION, POWDER, FOR SOLUTION INTRAMUSCULAR; INTRAVENOUS at 10:04

## 2023-10-02 RX ADMIN — TRANEXAMIC ACID 1000 MG: 10 INJECTION, SOLUTION INTRAVENOUS at 10:05

## 2023-10-02 RX ADMIN — LIDOCAINE HYDROCHLORIDE 5 ML: 10 INJECTION, SOLUTION EPIDURAL; INFILTRATION; INTRACAUDAL; PERINEURAL at 09:56

## 2023-10-02 RX ADMIN — MIDAZOLAM 1 MG: 1 INJECTION INTRAMUSCULAR; INTRAVENOUS at 09:56

## 2023-10-02 RX ADMIN — KETOROLAC TROMETHAMINE 15 MG: 15 INJECTION, SOLUTION INTRAMUSCULAR; INTRAVENOUS at 22:30

## 2023-10-02 RX ADMIN — ASPIRIN 81 MG: 81 TABLET, COATED ORAL at 20:50

## 2023-10-02 RX ADMIN — AMLODIPINE BESYLATE 5 MG: 5 TABLET ORAL at 17:09

## 2023-10-02 RX ADMIN — FENTANYL CITRATE 50 MCG: 50 INJECTION, SOLUTION INTRAMUSCULAR; INTRAVENOUS at 09:56

## 2023-10-02 RX ADMIN — DEXMEDETOMIDINE HYDROCHLORIDE 8 MCG: 100 INJECTION, SOLUTION INTRAVENOUS at 09:56

## 2023-10-02 RX ADMIN — SODIUM CHLORIDE, SODIUM LACTATE, POTASSIUM CHLORIDE, AND CALCIUM CHLORIDE: 600; 310; 30; 20 INJECTION, SOLUTION INTRAVENOUS at 09:36

## 2023-10-02 SDOH — HEALTH STABILITY: PHYSICAL HEALTH: ON AVERAGE, HOW MANY MINUTES DO YOU ENGAGE IN EXERCISE AT THIS LEVEL?: 0 MIN

## 2023-10-02 SDOH — SOCIAL STABILITY: SOCIAL NETWORK: HOW OFTEN DO YOU ATTEND CHURCH OR RELIGIOUS SERVICES?: PATIENT DECLINED

## 2023-10-02 SDOH — SOCIAL STABILITY: SOCIAL NETWORK: ARE YOU MARRIED, WIDOWED, DIVORCED, SEPARATED, NEVER MARRIED, OR LIVING WITH A PARTNER?: PATIENT DECLINED

## 2023-10-02 SDOH — SOCIAL STABILITY: SOCIAL INSECURITY: HAVE YOU HAD THOUGHTS OF HARMING ANYONE ELSE?: NO

## 2023-10-02 SDOH — SOCIAL STABILITY: SOCIAL INSECURITY: DO YOU FEEL UNSAFE GOING BACK TO THE PLACE WHERE YOU ARE LIVING?: NO

## 2023-10-02 SDOH — SOCIAL STABILITY: SOCIAL NETWORK: HOW OFTEN DO YOU GET TOGETHER WITH FRIENDS OR RELATIVES?: PATIENT DECLINED

## 2023-10-02 SDOH — ECONOMIC STABILITY: TRANSPORTATION INSECURITY
IN THE PAST 12 MONTHS, HAS THE LACK OF TRANSPORTATION KEPT YOU FROM MEDICAL APPOINTMENTS OR FROM GETTING MEDICATIONS?: PATIENT DECLINED

## 2023-10-02 SDOH — SOCIAL STABILITY: SOCIAL NETWORK: IN A TYPICAL WEEK, HOW MANY TIMES DO YOU TALK ON THE PHONE WITH FAMILY, FRIENDS, OR NEIGHBORS?: PATIENT DECLINED

## 2023-10-02 SDOH — ECONOMIC STABILITY: INCOME INSECURITY: IN THE LAST 12 MONTHS, WAS THERE A TIME WHEN YOU WERE NOT ABLE TO PAY THE MORTGAGE OR RENT ON TIME?: PATIENT REFUSED

## 2023-10-02 SDOH — SOCIAL STABILITY: SOCIAL INSECURITY: ARE YOU OR HAVE YOU BEEN THREATENED OR ABUSED PHYSICALLY, EMOTIONALLY, OR SEXUALLY BY ANYONE?: NO

## 2023-10-02 SDOH — HEALTH STABILITY: MENTAL HEALTH
STRESS IS WHEN SOMEONE FEELS TENSE, NERVOUS, ANXIOUS, OR CAN'T SLEEP AT NIGHT BECAUSE THEIR MIND IS TROUBLED. HOW STRESSED ARE YOU?: PATIENT DECLINED

## 2023-10-02 SDOH — SOCIAL STABILITY: SOCIAL NETWORK
DO YOU BELONG TO ANY CLUBS OR ORGANIZATIONS SUCH AS CHURCH GROUPS UNIONS, FRATERNAL OR ATHLETIC GROUPS, OR SCHOOL GROUPS?: PATIENT DECLINED

## 2023-10-02 SDOH — SOCIAL STABILITY: SOCIAL INSECURITY: WERE YOU ABLE TO COMPLETE ALL THE BEHAVIORAL HEALTH SCREENINGS?: YES

## 2023-10-02 SDOH — ECONOMIC STABILITY: HOUSING INSECURITY
IN THE LAST 12 MONTHS, WAS THERE A TIME WHEN YOU DID NOT HAVE A STEADY PLACE TO SLEEP OR SLEPT IN A SHELTER (INCLUDING NOW)?: PATIENT REFUSED

## 2023-10-02 SDOH — SOCIAL STABILITY: SOCIAL INSECURITY: HAS ANYONE EVER THREATENED TO HURT YOUR FAMILY OR YOUR PETS?: NO

## 2023-10-02 SDOH — SOCIAL STABILITY: SOCIAL INSECURITY: DO YOU FEEL ANYONE HAS EXPLOITED OR TAKEN ADVANTAGE OF YOU FINANCIALLY OR OF YOUR PERSONAL PROPERTY?: NO

## 2023-10-02 SDOH — ECONOMIC STABILITY: FOOD INSECURITY: WITHIN THE PAST 12 MONTHS, THE FOOD YOU BOUGHT JUST DIDN'T LAST AND YOU DIDN'T HAVE MONEY TO GET MORE.: PATIENT DECLINED

## 2023-10-02 SDOH — HEALTH STABILITY: MENTAL HEALTH: CURRENT SMOKER: 0

## 2023-10-02 SDOH — ECONOMIC STABILITY: INCOME INSECURITY: HOW HARD IS IT FOR YOU TO PAY FOR THE VERY BASICS LIKE FOOD, HOUSING, MEDICAL CARE, AND HEATING?: NOT HARD AT ALL

## 2023-10-02 SDOH — SOCIAL STABILITY: SOCIAL INSECURITY: DOES ANYONE TRY TO KEEP YOU FROM HAVING/CONTACTING OTHER FRIENDS OR DOING THINGS OUTSIDE YOUR HOME?: NO

## 2023-10-02 SDOH — SOCIAL STABILITY: SOCIAL NETWORK: HOW OFTEN DO YOU ATTENT MEETINGS OF THE CLUB OR ORGANIZATION YOU BELONG TO?: PATIENT DECLINED

## 2023-10-02 SDOH — HEALTH STABILITY: PHYSICAL HEALTH: ON AVERAGE, HOW MANY DAYS PER WEEK DO YOU ENGAGE IN MODERATE TO STRENUOUS EXERCISE (LIKE A BRISK WALK)?: 0 DAYS

## 2023-10-02 SDOH — ECONOMIC STABILITY: TRANSPORTATION INSECURITY
IN THE PAST 12 MONTHS, HAS LACK OF TRANSPORTATION KEPT YOU FROM MEETINGS, WORK, OR FROM GETTING THINGS NEEDED FOR DAILY LIVING?: PATIENT DECLINED

## 2023-10-02 SDOH — SOCIAL STABILITY: SOCIAL INSECURITY: ABUSE: ADULT

## 2023-10-02 SDOH — ECONOMIC STABILITY: FOOD INSECURITY: WITHIN THE PAST 12 MONTHS, YOU WORRIED THAT YOUR FOOD WOULD RUN OUT BEFORE YOU GOT MONEY TO BUY MORE.: PATIENT DECLINED

## 2023-10-02 SDOH — SOCIAL STABILITY: SOCIAL INSECURITY: ARE THERE ANY APPARENT SIGNS OF INJURIES/BEHAVIORS THAT COULD BE RELATED TO ABUSE/NEGLECT?: NO

## 2023-10-02 ASSESSMENT — COGNITIVE AND FUNCTIONAL STATUS - GENERAL
DAILY ACTIVITIY SCORE: 20
WALKING IN HOSPITAL ROOM: A LITTLE
PATIENT BASELINE BEDBOUND: NO
MOBILITY SCORE: 20
MOVING TO AND FROM BED TO CHAIR: A LITTLE
PERSONAL GROOMING: A LITTLE
DRESSING REGULAR UPPER BODY CLOTHING: A LITTLE
STANDING UP FROM CHAIR USING ARMS: A LITTLE
MOBILITY SCORE: 20
WALKING IN HOSPITAL ROOM: A LITTLE
STANDING UP FROM CHAIR USING ARMS: A LITTLE
DRESSING REGULAR LOWER BODY CLOTHING: A LITTLE
HELP NEEDED FOR BATHING: A LITTLE
DRESSING REGULAR LOWER BODY CLOTHING: A LITTLE
DAILY ACTIVITIY SCORE: 21
HELP NEEDED FOR BATHING: A LITTLE
TOILETING: A LITTLE
MOVING TO AND FROM BED TO CHAIR: A LITTLE
CLIMB 3 TO 5 STEPS WITH RAILING: A LITTLE
CLIMB 3 TO 5 STEPS WITH RAILING: A LITTLE

## 2023-10-02 ASSESSMENT — ACTIVITIES OF DAILY LIVING (ADL)
GROOMING: INDEPENDENT
JUDGMENT_ADEQUATE_SAFELY_COMPLETE_DAILY_ACTIVITIES: YES
PATIENT'S MEMORY ADEQUATE TO SAFELY COMPLETE DAILY ACTIVITIES?: YES
ADEQUATE_TO_COMPLETE_ADL: YES
DRESSING YOURSELF: INDEPENDENT
BATHING: INDEPENDENT
FEEDING YOURSELF: INDEPENDENT
HEARING - RIGHT EAR: FUNCTIONAL
HEARING - LEFT EAR: FUNCTIONAL
TOILETING: INDEPENDENT
WALKS IN HOME: NEEDS ASSISTANCE

## 2023-10-02 ASSESSMENT — LIFESTYLE VARIABLES
HAS A RELATIVE, FRIEND, DOCTOR, OR ANOTHER HEALTH PROFESSIONAL EXPRESSED CONCERN ABOUT YOUR DRINKING OR SUGGESTED YOU CUT DOWN: NO
HOW OFTEN DURING THE LAST YEAR HAVE YOU HAD A FEELING OF GUILT OR REMORSE AFTER DRINKING: NEVER
HOW OFTEN DURING THE LAST YEAR HAVE YOU NEEDED AN ALCOHOLIC DRINK FIRST THING IN THE MORNING TO GET YOURSELF GOING AFTER A NIGHT OF HEAVY DRINKING: NEVER
AUDIT-C TOTAL SCORE: 4
HAVE YOU OR SOMEONE ELSE BEEN INJURED AS A RESULT OF YOUR DRINKING: NO
HOW OFTEN DURING THE LAST YEAR HAVE YOU FAILED TO DO WHAT WAS NORMALLY EXPECTED FROM YOU BECAUSE OF DRINKING: NEVER
HOW MANY STANDARD DRINKS CONTAINING ALCOHOL DO YOU HAVE ON A TYPICAL DAY: 1 OR 2
SKIP TO QUESTIONS 9-10: 1
AUDIT TOTAL SCORE: 4
HOW OFTEN DO YOU HAVE 6 OR MORE DRINKS ON ONE OCCASION: NEVER
AUDIT-C TOTAL SCORE: 4
HOW OFTEN DURING THE LAST YEAR HAVE YOU BEEN UNABLE TO REMEMBER WHAT HAPPENED THE NIGHT BEFORE BECAUSE YOU HAD BEEN DRINKING: NEVER
HOW OFTEN DO YOU HAVE A DRINK CONTAINING ALCOHOL: 4 OR MORE TIMES A WEEK
HOW OFTEN DURING THE LAST YEAR HAVE YOU FOUND THAT YOU WERE NOT ABLE TO STOP DRINKING ONCE YOU HAD STARTED: NEVER

## 2023-10-02 ASSESSMENT — PAIN - FUNCTIONAL ASSESSMENT
PAIN_FUNCTIONAL_ASSESSMENT: 0-10

## 2023-10-02 ASSESSMENT — PAIN SCALES - GENERAL
PAINLEVEL_OUTOF10: 0 - NO PAIN
PAINLEVEL_OUTOF10: 3
PAINLEVEL_OUTOF10: 0 - NO PAIN
PAINLEVEL_OUTOF10: 1
PAINLEVEL_OUTOF10: 3
PAINLEVEL_OUTOF10: 2
PAINLEVEL_OUTOF10: 2

## 2023-10-02 ASSESSMENT — PATIENT HEALTH QUESTIONNAIRE - PHQ9
1. LITTLE INTEREST OR PLEASURE IN DOING THINGS: NOT AT ALL
2. FEELING DOWN, DEPRESSED OR HOPELESS: NOT AT ALL
SUM OF ALL RESPONSES TO PHQ9 QUESTIONS 1 & 2: 0

## 2023-10-02 ASSESSMENT — COLUMBIA-SUICIDE SEVERITY RATING SCALE - C-SSRS
2. HAVE YOU ACTUALLY HAD ANY THOUGHTS OF KILLING YOURSELF?: NO
1. IN THE PAST MONTH, HAVE YOU WISHED YOU WERE DEAD OR WISHED YOU COULD GO TO SLEEP AND NOT WAKE UP?: NO
6. HAVE YOU EVER DONE ANYTHING, STARTED TO DO ANYTHING, OR PREPARED TO DO ANYTHING TO END YOUR LIFE?: NO

## 2023-10-02 NOTE — ANESTHESIA PROCEDURE NOTES
Peripheral Block    Patient location during procedure: holding area  Reason for block: post-op pain management  Staffing  Performed: attending   Authorized by: Edilson Carreon MD    Performed by: Edilson Carreon MD  Preanesthetic Checklist  Completed: patient identified, IV checked, site marked, risks and benefits discussed, surgical consent, monitors and equipment checked, pre-op evaluation and timeout performed   Timeout performed at: 10/2/2023 9:30 AM  Peripheral Block  Prep: ChloraPrep and site prepped and draped  Patient monitoring: continuous pulse ox, heart rate and cardiac monitor  Injection technique: single-shot  Guidance: nerve stimulator and ultrasound guided  Local infiltration: lidocaine  Infiltration strength: 1 %  Dose: 3 mL  Needle  Needle localization: anatomical landmarks, nerve stimulator, paresthesias and ultrasound guidance  Test dose: negative  Assessment  Injection assessment: negative aspiration for heme, local visualized surrounding nerve on ultrasound, no paresthesia on injection, incremental injection and transient paresthesias  Paresthesia pain: none  Heart rate change: no  Additional Notes  Versed 2 mg Fent 25 ug

## 2023-10-02 NOTE — ANESTHESIA PROCEDURE NOTES
Peripheral Block    Patient location during procedure: holding area  Reason for block: post-op pain management  Staffing  Performed: attending   Authorized by: Edilson Carreon MD    Performed by: Edilson Carreon MD  Preanesthetic Checklist  Completed: patient identified, IV checked, site marked, risks and benefits discussed, surgical consent, monitors and equipment checked, pre-op evaluation and timeout performed   Timeout performed at:   Peripheral Block  Prep: ChloraPrep and site prepped and draped  Patient monitoring: continuous pulse ox, heart rate and cardiac monitor  Injection technique: single-shot  Guidance: nerve stimulator and ultrasound guided  Local infiltration: lidocaine  Infiltration strength: 1 %  Dose: 3 mL  Needle  Needle localization: anatomical landmarks, nerve stimulator, paresthesias and ultrasound guidance  Test dose: negative  Assessment  Injection assessment: negative aspiration for heme, local visualized surrounding nerve on ultrasound, no paresthesia on injection, incremental injection and transient paresthesias  Paresthesia pain: immediately resolved  Heart rate change: no

## 2023-10-02 NOTE — PROCEDURES
Peripheral Block    Patient location during procedure: holding area  Start time: 10/2/2023 8:35 AM  End time: 10/2/2023 8:45 AM  Reason for block: post-op pain management  Staffing  Performed: attending   Authorized by: Edilson Carreon MD    Performed by: Edilson Carreon MD  Preanesthetic Checklist  Completed: patient identified, IV checked, site marked, risks and benefits discussed, surgical consent, monitors and equipment checked, pre-op evaluation and timeout performed   Timeout performed at: 10/2/2023 8:35 AM  Peripheral Block  Patient position: laying flat  Prep: ChloraPrep and site prepped and draped  Patient monitoring: continuous pulse ox, heart rate and cardiac monitor  Block type: adductor canal  Laterality: right  Injection technique: single-shot  Guidance: nerve stimulator and ultrasound guided  Local infiltration: lidocaine  Infiltration strength: 1 %  Dose: 3 mL  Needle  Needle gauge: 20 G  Needle localization: anatomical landmarks, nerve stimulator, paresthesias and ultrasound guidance  Assessment  Injection assessment: negative aspiration for heme, local visualized surrounding nerve on ultrasound, no paresthesia on injection, incremental injection and transient paresthesias  Paresthesia pain: immediately resolved  Heart rate change: no  Slow fractionated injection: yes  Additional Notes  Adductor Canal Block  Time out, verbal and written consent  Sterile technique, wide prep w chlorhex   Sedation per record  Lido local, US guided  Marcaine 0.5% + epi 1/200K +decadron 5mg+ tetracaine 20mg  35 ml's  No complications                   GENERAL: No fever or chills, //             EYES: no change in vision, //             HEENT: no trouble swallowing or speaking, //             CARDIAC: no chest pain, //              PULMONARY: sob, //             GI: no abdominal pain, no nausea or no vomiting, no diarrhea or constipation, //             : No changes in urination,  //            SKIN: no rashes,  //            NEURO: no headache,  //             MSK: No joint pain otherwise as HPI or negative. ~Jose J Pierre DO PGY1

## 2023-10-02 NOTE — ANESTHESIA PROCEDURE NOTES
Airway  Date/Time: 10/2/2023 9:59 AM  Urgency: elective    Airway not difficult    Staffing  Performed: CRNA   Authorized by: Edilson Carreon MD    Performed by: ONEIL Kothari-JOHANNE  Patient location during procedure: OR    Indications and Patient Condition  Indications for airway management: anesthesia  Spontaneous Ventilation: absent  Sedation level: deep  Preoxygenated: yes  Patient position: sniffing  Mask difficulty assessment: 1 - vent by mask    Final Airway Details  Final airway type: endotracheal airway      Successful airway: ETT - double lumen left  Cuffed: yes   Successful intubation technique: video laryngoscopy  Facilitating devices/methods: intubating stylet  Endotracheal tube insertion site: oral  Blade: Jimmy  Blade size: #4  Cormack-Lehane Classification: grade I - full view of glottis  Placement verified by: chest auscultation and capnometry   Measured from: teeth  ETT to teeth (cm): 22  Number of attempts at approach: 1    Additional Comments  7.5 OETT used.

## 2023-10-02 NOTE — CARE PLAN
The patient's goals for the shift include      The clinical goals for the shift include to keep pain well controlled    Over the shift, the patient did not make progress toward the following goals. Barriers to progression include . Recommendations to address these barriers include .

## 2023-10-02 NOTE — CONSULTS
Consults    Reason For Consult  Medical management    History Of Present Illness  Edilson Cantu is a 73 y.o. male presenting with s/p R total knee replacement which was done today, uneventful. Pt denies pain currently. Ate dinner. Was given hydrochlorothiazide and amlodipine after surgery. No sob/chest pain/abdominal pain.      Past Medical History  He has a past medical history of Parkinsons. HTN, Depression  OA  L foot drop   axonal polyneuropathy   venous insufficiency   postlaminectomy syndrome  lumbar stenosis and claudication   lumbar back pain   Surgical History  He has a past surgical history that includes Knee arthroscopy w/ debridement (08/24/2017) and Other surgical history (09/10/2019).     Social History  He reports that he has never smoked. He has never been exposed to tobacco smoke. He has never used smokeless tobacco. Alcohol use questions deferred to the physician. He reports that he does not use drugs.    Family History  Family History   Problem Relation Name Age of Onset    Other (Cardiac disorder) Father          Allergies  Patient has no known allergies.    Review of Systems     Physical Exam  HENT:      Head: Normocephalic.   Cardiovascular:      Rate and Rhythm: Normal rate and regular rhythm.   Pulmonary:      Effort: Pulmonary effort is normal.      Breath sounds: Normal breath sounds.   Abdominal:      Palpations: Abdomen is soft.   Neurological:      Mental Status: He is alert.     R leg with ace wrap and ice pack. Able to move toes b/l and lift RLL off bed slightly without pain.      Last Recorded Vitals  /82   Pulse 88   Temp 37 °C (98.6 °F)   Resp 18   Wt 68 kg (150 lb)   SpO2 98%     Relevant Results  Labs from September 2023 reviewed, essentially wnl     Assessment/Plan     HTN: Resume antihypertensives  Parkinson's: Cont siniment  S/p R total knee replacement: as per ortho, aspirin for DVT prophlyaxis noted. Would avoid long-term nsaid (home meds include aleve  daily)    Valeria Wilson MD

## 2023-10-02 NOTE — BRIEF OP NOTE
Date: 10/2/2023  OR Location: [facility] OR    Name: Edilson Cantu YOB: 1950, Age: 73 y.o., MRN: [id number], Sex: male    Diagnosis  Osteoarthritis of right knee Osteoarthritis of right knee     Procedures    * RIGHT (DEPUY) TOTAL KNEE REPLACEMENT    Surgeons      * Yobany Reynolds - Primary    Resident/Fellow/Other Assistant:  Ozzy Garcia DO Root, Jenna, NP    Procedure Summary  Anesthesia: general  ASA: III  Anesthesia Staff: Anesthesiologist: Edilson Carreon MD  CRNA: ONEIL Kothari-CRNA  Estimated Blood Loss: 50mL  Intra-op Medications: * No intraprocedure medications in log *           Anesthesia Record               Intraprocedure I/O Totals          Intake    Dexmedetomidine 0.00 mL    The total shown is the total volume documented since Anesthesia Start was filed.    tranexamic acid 1,000 mg/100 mL NS (premix) 100.00 mL    Total Intake 100 mL          Specimen:   ID Type Source Tests Collected by Time   1 : bone Tissue KNEE ARTHROPLASTY RIGHT SURGICAL PATHOLOGY EXAM Yobany Reynolds MD 10/2/2023 105        Staff:   Circulator: Payal Wyatt RN  Scrub Person: Kassie De          Findings: as above    Complications:  None; patient tolerated the procedure well.     Disposition: PACU - hemodynamically stable.  Condition: stable  Specimens Collected:   ID Type Source Tests Collected by Time   1 : bone Tissue KNEE ARTHROPLASTY RIGHT SURGICAL PATHOLOGY EXAM Yobany Reynolds MD 10/2/2023 105     Attending Attestation:     Yobany Reynolds  Phone Number: [phone]

## 2023-10-02 NOTE — ANESTHESIA POSTPROCEDURE EVALUATION
Patient: Edilson Cantu    Procedure Summary       Date: 10/02/23 Room / Location: [facility] OR 03 / [facility]    Anesthesia Start: 0936 Anesthesia Stop: 1421    Procedure: RIGHT (DEPUY) TOTAL KNEE REPLACEMENT (Right: Knee) Diagnosis:     Surgeons: Yobany Reynolds MD Responsible Provider: Edilson Carreon MD    Anesthesia Type: spinal ASA Status: 3            Anesthesia Type: spinal    Vitals Value Taken Time   /68 10/02/23 1341   Temp 36.7 °C (98.1 °F) 10/02/23 1156   Pulse 80 10/02/23 1341   Resp 18 10/02/23 1341   SpO2 98 % 10/02/23 1156       Anesthesia Post Evaluation    No notable events documented.

## 2023-10-02 NOTE — OP NOTE
RIGHT (DEPUY) TOTAL KNEE REPLACEMENT (R) Operative Note     Date: 10/2/2023  OR Location: A OR    Name: Edilson Cantu, : 1950, Age: 73 y.o., MRN: [id number], Sex: male    Diagnosis  Right tka    Procedures    * RIGHT (DEPUY) TOTAL KNEE REPLACEMENT    Surgeons      * Yobany Reynolds - Primary    Resident/Fellow/Other Assistant:  Alen Garcia    Procedure Summary  Anesthesia: Spinal  ASA: III  Anesthesia Staff: Anesthesiologist: Edilson Carreon MD  CRNA: ONEIL Kothari-CRNA  Estimated Blood Loss: 50mL  Intra-op Medications: * No intraprocedure medications in log *           Anesthesia Record               Intraprocedure I/O Totals          Intake    Dexmedetomidine 0.00 mL    The total shown is the total volume documented since Anesthesia Start was filed.    LR 1200.00 mL    tranexamic acid 1,000 mg/100 mL NS (premix) 100.00 mL    Total Intake 1300 mL       Output    Est. Blood Loss 50 mL    Total Output 50 mL       Net    Net Volume 1250 mL          Specimen:   ID Type Source Tests Collected by Time   1 : RIGHT KNEE Tissue KNEE ARTHROPLASTY RIGHT SURGICAL PATHOLOGY EXAM Yobany Reynolds MD 10/2/2023 1052        Staff:   Circulator: Payal Wyatt RN  Scrub Person: Kassie De         Drains and/or Catheters: * None in log *    Tourniquet Times:         Implants:  Implants       Type Name Action Serial No.      Joint Knee FEMORAL, ATTUNE PS, POR, SZ 7, RT - [id number] Implanted      Joint Knee INSERT, ATTUNE PS RP, SZ 7, 7MM - [id number] Implanted      Joint Knee TIBAL BASE, ATTUNE RP, POR, SZ 7 - [id number] Implanted               Findings: severe djd    Indications: Edilson Cantu is an 73 y.o. male who is having surgery for * No pre-op diagnosis entered *. R knee arthritis    The patient was seen in the preoperative area. The risks, benefits, complications, treatment options, non-operative alternatives, expected recovery and outcomes were discussed with the patient. The possibilities of reaction  to medication, pulmonary aspiration, injury to surrounding structures, bleeding, recurrent infection, the need for additional procedures, failure to diagnose a condition, and creating a complication requiring transfusion or operation were discussed with the patient. The patient concurred with the proposed plan, giving informed consent.  The site of surgery was properly noted/marked if necessary per policy. The patient has been actively warmed in preoperative area. Preoperative antibiotics have been ordered and given within 1 hours of incision. Venous thrombosis prophylaxis have been ordered including bilateral sequential compression devices and chemical prophylaxis    Procedure Details: Statement of medical necessity:  This is a 73-year-old male with severe degenerative disease of the right knee that has failed non operative management. the risks, benefits and alternatives of total hip arthroplasty were discussed with the patient and they signed informed consent.     Description of procedure:   The patient was brought to the operating room and placed on the operating table in the supine position. All bony prominences were well padded. Appropriate preoperative antibiotics were administered. Anesthesia was induced by the anesthesia team and a time out was performed. The patient was identified by name and medical record number and the laterality and the site of surgery were confirmed by all present.     Under pneumatic tourniquet control, a longitudinal anterior skin incision was made with medial parapatellar arthrotomy. Hemostasis was obtained with Bovie electrocautery. Comprehensive exposure the knee was carried out for total knee arthroplasty. The patella was subluxed laterally and the knee placed in a flexed position.      Using intramedullary alignment, the distal femur was cut in a 6° valgus position. The appropriate-sized femoral component was selected based upon intraoperative measurements of the sagittal  dimension of the femur. The distal femoral cutting guide was placed perpendicular to Whitesides line and parallel with the transepicondylar axis, with  5° of external rotation based upon the posterior condylar axis, this was done because of significant posterior wear of the lateral posterior condyle. The distal femur was then finished to accept a posterior stabilized prosthesis.     Attention was then directed to the proximal tibia. Meniscal remnants were excised. Using extramedullary alignment, the proximal tibia was cut perpendicular to its longitudinal axis with a 3° posterior slope. Osteophytes were excised from the posterior femur, medial and lateral femur, and circumferentially about the tibia to avoid soft tissue impingement. The posterior capsule, medial lateral soft tissue structures were injected using combination of Duramorph, Ropivicaine, toradol and epinephrine.Flexion and extension gaps were assessed.     Flexion and extension gaps were equal and rectangular. No ligamentous release was necessary for appropriate balance.     Trial components were placed. Knee achieved full extension and flexion with excellent varus and valgus stability throughout range of motion. The patella tracked centrally. Tibial component rotation was marked, and the tibia finished in the usual fashion to accept an appropriately sized tibial component.     bone surfaces prepared with pulsatile saline irrigation. Bone quality was assessed and deemed appropriate for press fit implants. Final implants were inserted and impacted into position with excellent stability noted. The final articular polyethyelne surface was then inserted.      The patella was assessed and its surfaces judged appropriate for non prosthetic patellar arthroplasty. Non prosthetic patellar arthroplasty was then performed, with excision of synovium from the periphery of the patella and circumferential electrocautery and excision of ostephytes.      With the final  implants in placed the mechanics of the knee were once again confirmed and noted to be excellent.      Joint was irrigated with dilute betadine solution, followed by normal saline.   The arthrotomy was reapproximated using #1 poly and #2 quill sutures. Subcutaneous layer was closed with 2-0 poly, subcuticular layer with 3-0 v-lock, then perineo mesh and glue dressing was placed followed by mepilex, cast padding, and ace wrap.     Patient was awoken from anesthesia and brought to the pacu in stable position.     Statement of staff presence: I was present and scrubbed for all critical portions of the procedure and was immediately available during closing      Complications:  None; patient tolerated the procedure well.    Disposition: PACU - hemodynamically stable.  Condition: stable         Additional Details: none    Attending Attestation: I was present and scrubbed for the key portions of the procedure.    Yobany Reynolds  Phone Number: [phone]

## 2023-10-02 NOTE — ANESTHESIA PREPROCEDURE EVALUATION
Patient: Edilson Cantu    Procedure Information       Date/Time: 10/02/23 0900    Procedure: RIGHT (DEPUY) TOTAL KNEE REPLACEMENT (Right: Knee)    Location: [facility] / [facility]    Surgeons: Yobany Reynolds MD            Relevant Problems   Anesthesia (within normal limits)      Cardiovascular   (+) HTN (hypertension)      Endocrine (within normal limits)      GI (within normal limits)      /Renal (within normal limits)      Neuro/Psych  Parkinson's Dz stable   (+) Depression      Pulmonary (within normal limits)      GI/Hepatic (within normal limits)      Hematology (within normal limits)      Musculoskeletal   (+) Knee osteoarthritis   (+) Lumbosacral spinal stenosis   (+) Osteoarthritis of right knee, unspecified osteoarthritis type      Infectious Disease (within normal limits)      Other   (+) Arthritis       Clinical information reviewed:    Allergies  Meds               NPO Detail:  NPO/Void Status  Date of Last Liquid: 10/01/23  Date of Last Solid: 10/01/23         Physical Exam    Airway  Mallampati: I  TM distance: >3 FB  Neck ROM: full     Cardiovascular    Dental    Pulmonary    Abdominal            Anesthesia Plan    ASA 3     spinal     The patient is not a current smoker.    intravenous induction   Postoperative administration of opioids is intended.  Anesthetic plan and risks discussed with patient.  Use of blood products discussed with patient who.    Plan discussed with CRNA.

## 2023-10-03 ENCOUNTER — HOME HEALTH ADMISSION (OUTPATIENT)
Dept: HOME HEALTH SERVICES | Facility: HOME HEALTH | Age: 73
End: 2023-10-03
Payer: MEDICARE

## 2023-10-03 ENCOUNTER — DOCUMENTATION (OUTPATIENT)
Dept: ORTHOPEDIC SURGERY | Facility: CLINIC | Age: 73
End: 2023-10-03
Payer: MEDICARE

## 2023-10-03 VITALS
HEIGHT: 69 IN | WEIGHT: 150 LBS | OXYGEN SATURATION: 96 % | SYSTOLIC BLOOD PRESSURE: 102 MMHG | DIASTOLIC BLOOD PRESSURE: 68 MMHG | RESPIRATION RATE: 16 BRPM | HEART RATE: 68 BPM | BODY MASS INDEX: 22.22 KG/M2 | TEMPERATURE: 97.5 F

## 2023-10-03 LAB
ERYTHROCYTE [DISTWIDTH] IN BLOOD BY AUTOMATED COUNT: 12 % (ref 11.5–14.5)
HCT VFR BLD AUTO: 34.2 % (ref 41–52)
HGB BLD-MCNC: 11.2 G/DL (ref 13.5–17.5)
MCH RBC QN AUTO: 31.2 PG (ref 26–34)
MCHC RBC AUTO-ENTMCNC: 32.7 G/DL (ref 32–36)
MCV RBC AUTO: 95 FL (ref 80–100)
NRBC BLD-RTO: 0 /100 WBCS (ref 0–0)
PLATELET # BLD AUTO: 204 X10*3/UL (ref 150–450)
PMV BLD AUTO: 9.8 FL (ref 7.5–11.5)
RBC # BLD AUTO: 3.59 X10*6/UL (ref 4.5–5.9)
WBC # BLD AUTO: 9.1 X10*3/UL (ref 4.4–11.3)

## 2023-10-03 PROCEDURE — 2500000001 HC RX 250 WO HCPCS SELF ADMINISTERED DRUGS (ALT 637 FOR MEDICARE OP): Performed by: STUDENT IN AN ORGANIZED HEALTH CARE EDUCATION/TRAINING PROGRAM

## 2023-10-03 PROCEDURE — G0378 HOSPITAL OBSERVATION PER HR: HCPCS

## 2023-10-03 PROCEDURE — 85027 COMPLETE CBC AUTOMATED: CPT | Performed by: STUDENT IN AN ORGANIZED HEALTH CARE EDUCATION/TRAINING PROGRAM

## 2023-10-03 PROCEDURE — 2500000004 HC RX 250 GENERAL PHARMACY W/ HCPCS (ALT 636 FOR OP/ED): Performed by: STUDENT IN AN ORGANIZED HEALTH CARE EDUCATION/TRAINING PROGRAM

## 2023-10-03 PROCEDURE — 36415 COLL VENOUS BLD VENIPUNCTURE: CPT | Performed by: STUDENT IN AN ORGANIZED HEALTH CARE EDUCATION/TRAINING PROGRAM

## 2023-10-03 RX ADMIN — HYDROCHLOROTHIAZIDE 25 MG: 25 TABLET ORAL at 09:24

## 2023-10-03 RX ADMIN — CARBIDOPA AND LEVODOPA 2 TABLET: 25; 100 TABLET ORAL at 11:41

## 2023-10-03 RX ADMIN — ASPIRIN 81 MG: 81 TABLET, COATED ORAL at 09:24

## 2023-10-03 RX ADMIN — KETOROLAC TROMETHAMINE 15 MG: 15 INJECTION, SOLUTION INTRAMUSCULAR; INTRAVENOUS at 04:22

## 2023-10-03 RX ADMIN — CARBIDOPA AND LEVODOPA 2 TABLET: 25; 100 TABLET ORAL at 01:17

## 2023-10-03 RX ADMIN — AMLODIPINE BESYLATE 5 MG: 5 TABLET ORAL at 09:24

## 2023-10-03 RX ADMIN — CEFAZOLIN SODIUM 2 G: 2 INJECTION, SOLUTION INTRAVENOUS at 00:17

## 2023-10-03 RX ADMIN — KETOROLAC TROMETHAMINE 15 MG: 15 INJECTION, SOLUTION INTRAMUSCULAR; INTRAVENOUS at 11:40

## 2023-10-03 SDOH — ECONOMIC STABILITY: FOOD INSECURITY: WITHIN THE PAST 12 MONTHS, THE FOOD YOU BOUGHT JUST DIDN'T LAST AND YOU DIDN'T HAVE MONEY TO GET MORE.: NEVER TRUE

## 2023-10-03 SDOH — ECONOMIC STABILITY: FOOD INSECURITY: WITHIN THE PAST 12 MONTHS, YOU WORRIED THAT YOUR FOOD WOULD RUN OUT BEFORE YOU GOT MONEY TO BUY MORE.: NEVER TRUE

## 2023-10-03 SDOH — ECONOMIC STABILITY: TRANSPORTATION INSECURITY
IN THE PAST 12 MONTHS, HAS LACK OF TRANSPORTATION KEPT YOU FROM MEETINGS, WORK, OR FROM GETTING THINGS NEEDED FOR DAILY LIVING?: NO

## 2023-10-03 SDOH — ECONOMIC STABILITY: INCOME INSECURITY: IN THE PAST 12 MONTHS, HAS THE ELECTRIC, GAS, OIL, OR WATER COMPANY THREATENED TO SHUT OFF SERVICE IN YOUR HOME?: NO

## 2023-10-03 SDOH — ECONOMIC STABILITY: INCOME INSECURITY: IN THE LAST 12 MONTHS, WAS THERE A TIME WHEN YOU WERE NOT ABLE TO PAY THE MORTGAGE OR RENT ON TIME?: NO

## 2023-10-03 SDOH — ECONOMIC STABILITY: INCOME INSECURITY: HOW HARD IS IT FOR YOU TO PAY FOR THE VERY BASICS LIKE FOOD, HOUSING, MEDICAL CARE, AND HEATING?: NOT VERY HARD

## 2023-10-03 SDOH — ECONOMIC STABILITY: TRANSPORTATION INSECURITY
IN THE PAST 12 MONTHS, HAS THE LACK OF TRANSPORTATION KEPT YOU FROM MEDICAL APPOINTMENTS OR FROM GETTING MEDICATIONS?: NO

## 2023-10-03 SDOH — ECONOMIC STABILITY: HOUSING INSECURITY
IN THE LAST 12 MONTHS, WAS THERE A TIME WHEN YOU DID NOT HAVE A STEADY PLACE TO SLEEP OR SLEPT IN A SHELTER (INCLUDING NOW)?: NO

## 2023-10-03 ASSESSMENT — COGNITIVE AND FUNCTIONAL STATUS - GENERAL
CLIMB 3 TO 5 STEPS WITH RAILING: A LITTLE
TURNING FROM BACK TO SIDE WHILE IN FLAT BAD: A LITTLE
MOBILITY SCORE: 18
MOBILITY SCORE: 24
WALKING IN HOSPITAL ROOM: A LITTLE
MOVING TO AND FROM BED TO CHAIR: A LITTLE
MOVING FROM LYING ON BACK TO SITTING ON SIDE OF FLAT BED WITH BEDRAILS: A LITTLE
DAILY ACTIVITIY SCORE: 24
STANDING UP FROM CHAIR USING ARMS: A LITTLE

## 2023-10-03 ASSESSMENT — PAIN SCALES - GENERAL
PAINLEVEL_OUTOF10: 5 - MODERATE PAIN
PAINLEVEL_OUTOF10: 0 - NO PAIN
PAINLEVEL_OUTOF10: 3

## 2023-10-03 ASSESSMENT — PAIN - FUNCTIONAL ASSESSMENT: PAIN_FUNCTIONAL_ASSESSMENT: 0-10

## 2023-10-03 NOTE — CARE PLAN
Problem: Mobility  Goal: STG - Patient will ambulate 50-80 feet house hold ambulation  Outcome: Progressing     Problem: Transfers  Goal: STG - Patient will perform bed mobility modified independent  Outcome: Progressing  Goal: STG - Patient will transfer sit to and from stand SBA  Outcome: Progressing  Goal: STG - Patient will perform car transfer SBA  Outcome: Progressing

## 2023-10-03 NOTE — PROGRESS NOTES
Physical Therapy    Physical Therapy Evaluation & Treatment    Patient Name: Edilson Cantu  MRN: [id number]  Today's Date: 10/3/2023   Time Calculation  Start Time: 1039  Stop Time: 1139  Time Calculation (min): 60 min    Assessment/Plan   PT Assessment  PT Assessment Results: Decreased strength, Decreased endurance  Rehab Prognosis: Good  End of Session Communication: Bedside nurse, Care Coordinator  End of Session Patient Position: Alarm off, not on at start of session (sitting in recliner chair with his spouse present with him)  IP OR SWING BED PT PLAN  Inpatient or Swing Bed: Inpatient  PT Plan  PT Eval Only Reason:  (Pt is discharging home with his spouse)  PT Frequency: PT eval only  PT Discharge Recommendations: Low intensity level of continued care  Equipment Recommended upon Discharge: Wheeled walker  PT Recommended Transfer Status: Contact guard  PT - OK to Discharge: Yes      Subjective     General Visit Information:  General  Reason for Referral: 74 yo male admitted 2' to R knee OA, s/p R TKA  Referred By: Dr. TRICIA Reynolds  Past Medical History Relevant to Rehab: OA, Parkinson's disease, L foot drop, lumbar laminecti=0my, HTN, lumbar stenosis claudication, plyneuropathy  Prior to Session Communication: Bedside nurse  Patient Position Received: Bed, 3 rail up, Alarm off, not on at start of session (Ot sitting at EOB Pt's spouse present with him)  General Comment: Pt states that he was independently ambulating house hold distances with his wheeled walker prior to coming in for surgery  Home Living:  Home Living  Type of Home: House  Lives With: Spouse (2 story with 1st floor set up for Pt)  Home Adaptive Equipment: Walker rolling or standard, Cane  Home Layout: Two level  Home Access: Stairs to enter with rails  Entrance Stairs-Rails: Left  Entrance Stairs-Number of Steps: 3  Bathroom Shower/Tub: Walk-in shower  Bathroom Toilet: Handicapped height  Bathroom Equipment: Grab bars in shower, Shower chair with  back  Prior Level of Function:  Prior Function Per Pt/Caregiver Report  Level of [place]: Independent with homemaking with ambulation  Precautions:  Precautions  Hearing/Visual Limitations: WFL  LE Weight Bearing Status: Weight Bearing as Tolerated (R LE)  Medical Precautions:  (R knee precautions)  Post-Surgical Precautions: Right total knee precautions  Vital Signs:       Objective   Pain:  Pain Assessment  Pain Assessment: 0-10  Pain Score: 5 - Moderate pain  Pain Location: Knee  Pain Orientation:  (R knee)  Cognition:  Cognition  Overall Cognitive Status: Within Functional Limits (A+O x 4)    General Assessments:            Functional Assessments:  Bed Mobility  Bed Mobility:  (not addressed 2' to Pt sitting up)    Transfers  Transfer: Yes  Transfer 1  Transfer From 1:  (sit<>stand min/CGA)    Ambulation/Gait Training  Ambulation/Gait Training Performed: Yes  Ambulation/Gait Training 1  Surface 1:  (Pt ambulated 60 feet x 2)  Device 1: Rolling walker  Assistance 1: Contact guard  Quality of Gait 1: Narrow base of support (Decreased cadnec, decreased step length)    Stairs  Stairs: Yes  Stairs  Rails 1: Left  Device 1: Single point cane  Assistance 1: Minimum assistance  Extremity/Trunk Assessments:  RUE   RUE : Within Functional Limits  LUE   LUE: Within Functional Limits  RLE   RLE :  (R LE limited 2' to surgery and bandaging, oterwise B LE are WFL)  LLE   LLE : Within Functional Limits  Treatments:       Bed Mobility  Bed Mobility:  (not addressed 2' to Pt sitting up)    Ambulation/Gait Training  Ambulation/Gait Training Performed: Yes  Ambulation/Gait Training 1  Surface 1:  (Pt ambulated 60 feet x 2)  Device 1: Rolling walker  Assistance 1: Contact guard  Quality of Gait 1: Narrow base of support (Decreased cadnec, decreased step length)  Transfers  Transfer: Yes  Transfer 1  Transfer From 1:  (sit<>stand min/CGA)    Stairs  Stairs: Yes  Stairs  Rails 1: Left  Device 1: Single point cane  Assistance 1:  Minimum assistance  Outcome Measures:  [facility] Basic Mobility  Turning from your back to your side while in a flat bed without using bedrails: A little  Moving from lying on your back to sitting on the side of a flat bed without using bedrails: A little  Moving to and from bed to chair (including a wheelchair): A little  Standing up from a chair using your arms (e.g. wheelchair or bedside chair): A little  To walk in hospital room: A little  Climbing 3-5 steps with railing: A little  Basic Mobility - Total Score: 18    Encounter Problems       Encounter Problems (Active)       Mobility       STG - Patient will ambulate (Progressing)       Start:  10/03/23    Expected End:  10/17/23               Transfers       STG - Patient will perform bed mobility (Progressing)       Start:  10/03/23    Expected End:  10/17/23            STG - Patient will transfer sit to and from stand (Progressing)       Start:  10/03/23    Expected End:  10/17/23            STG - Patient will perform car transfer (Progressing)       Start:  10/03/23    Expected End:  10/17/23                   Education Documentation  Postop Total Knee Replacement Exercises Lying Down, taught by Johnny Craig PT at 10/3/2023  2:12 PM.  Learner: Patient  Readiness: Acceptance  Method: Demonstration, Handout  Response: Verbalizes Understanding, Demonstrated Understanding    Education Comments  No comments found.

## 2023-10-03 NOTE — DISCHARGE SUMMARY
Discharge Diagnosis  Osteoarthritis of right knee, unspecified osteoarthritis type    Issues Requiring Follow-Up  Post operative follow up 10/20/23 at 11 am    Test Results Pending At Discharge  Pending Labs       Order Current Status    Surgical Pathology Exam In process            Hospital Course   Patient is 73 year old male, POD 1 from right total knee arthoplasty.  No post-operative issues, pain controlled, ambulating with walker.  Planning for discharge home later today.      Pertinent Physical Exam At Time of Discharge  Physical Exam  Constitutional:       Appearance: Normal appearance.   HENT:      Head: Normocephalic and atraumatic.   Eyes:      Extraocular Movements: Extraocular movements intact.      Conjunctiva/sclera: Conjunctivae normal.      Pupils: Pupils are equal, round, and reactive to light.   Cardiovascular:      Rate and Rhythm: Normal rate.      Pulses: Normal pulses.   Pulmonary:      Effort: Pulmonary effort is normal.   Abdominal:      Palpations: Abdomen is soft.   Musculoskeletal:         General: Swelling (2+ edema RLE, dressing C/D/I, motion and sensation intact RLE, 2+ DP/PT BLE) present.      Cervical back: Neck supple.   Skin:     General: Skin is warm and dry.      Capillary Refill: Capillary refill takes less than 2 seconds.   Neurological:      General: No focal deficit present.      Mental Status: He is alert and oriented to person, place, and time.   Psychiatric:         Mood and Affect: Mood normal.         Behavior: Behavior normal.         Thought Content: Thought content normal.         Judgment: Judgment normal.         Home Medications     Medication List      START taking these medications     acetaminophen 500 mg tablet; Commonly known as: Tylenol; Take 2 tablets   (1,000 mg) by mouth every 6 hours if needed for mild pain (1 - 3).   aspirin 81 mg chewable tablet; Chew 1 tablet (81 mg) 2 times a day.   naproxen 500 mg tablet; Commonly known as: Naprosyn; Take 1 tablet  (500   mg) by mouth 2 times a day with meals for 10 days.   oxyCODONE-acetaminophen 5-325 mg tablet; Commonly known as: Percocet;   Take 1 tablet by mouth every 4 hours if needed for severe pain (7 - 10)   for up to 7 days.     CONTINUE taking these medications     amLODIPine 5 mg tablet; Commonly known as: Norvasc; TAKE 1 TABLET BY   MOUTH EVERY DAY FOR BLOOD PRESSURE   bisacodyl 5 mg EC tablet; Commonly known as: Dulcolax   carbidopa-levodopa  mg tablet; Commonly known as: Sinemet   diclofenac sodium 1 % kit   hydroCHLOROthiazide 25 mg tablet; Commonly known as: HYDRODiuril; Take 1   tablet (25 mg) by mouth once daily.       Outpatient Follow-Up  Future Appointments   Date Time Provider Department Center   10/20/2023 11:00 AM Yobany Reynolds MD [id number] [facility]   1/11/2024  2:30 PM BARRERA KAY [id number] [facility]       Amarilys Rosenthal, APRN-CNP

## 2023-10-03 NOTE — PROGRESS NOTES
10/03/23 1052   Discharge Planning   Living Arrangements Spouse/significant other   Support Systems Spouse/significant other;Family members   Assistance Needed Patient is A&O X3, independent with ADLs, has a cane and walker at home he can use for ambulation as needed.   Type of Residence Private residence   Who is requesting discharge planning? Provider   Home or Post Acute Services In home services   Type of Home Care Services Home PT   Patient expects to be discharged to: Home with C   Does the patient need discharge transport arranged? No

## 2023-10-03 NOTE — PROGRESS NOTES
Mechanical fall    Called by nursing staff that patient had unwitnessed fall in his room while attempting to get out of bed without assistance.  Upon discussion with patient, he was sitting on the side of his bed and attempted to get up.  He states he lost his footing and slid down the down.  He denies hitting his head, there was no LOC.  He reports he did not hit his right knee.  He has no new pain or concerns.     Exam:  GCS 15, follows commands, PERRL  Right lower extremity 2+ edema (unchanged from previous exam), post-operative dressing C/D/I.  Sensations and motor intact to RLE.  2+ DP and PT pulses right lower extremity.  No accessory muscle use, regular rate, vital signs reviewed.      PLAN:  Patient may be discharged home with family.  Patient advised to not get up without assistance.  There is no indication for radiographic testing.  Home PT to see patient tomorrow for evaluation and treatment.  Dr. Reynolds updated on patient.    Amarilys Rosenthal, IN[employer]  Surgical Services  [facility]

## 2023-10-03 NOTE — CARE PLAN
The patient's goals for the shift include      The clinical goals for the shift include progressing

## 2023-10-03 NOTE — CARE PLAN
The patient's goals for the shift include      The clinical goals for the shift include to keep pain well controlled

## 2023-10-03 NOTE — DISCHARGE INSTRUCTIONS
You will not receive a call indicating that your prescription has been filled.  Please contact your pharmacy with any questions.    Please call the office for a refill request.  The office staff and orthopedic nurses cannot refill medications; messages should be left directly through the office.  Please do not call multiple times or call other members of the care team for medication needs, this will cause the refill to take longer.    Per State and Institutional policy, pain medications can only be refilled every 7 days for up to six weeks following surgery.    DRIVING & TRAVEL AFTER SURGERY   Patients should anticipate waiting at least 4-6 weeks before traveling long distances after surgery.  You will need to stop to walk around ever 1 hour during your travel to help with blood clot prevention.  Please call the office or your joint nurse to discuss prior to post-surgical travel.  Patients may not drive until cleared by the joint nurse or the office.    DENTAL PROCEDURES & CLEANINGS  You must wait a minimum of 3 months for elective dental appointments, including routine cleanings or dental work including bridges, crowns, extractions, etc..  For any dental visit - cleaning or dental procedures - patients must take an antibiotic 1 hour before the appointment.  Antibiotics are a lifelong need before dental appointments.  The antibiotic prescribed will be based on each patient's allergies.    WOUND CARE  If you experience continued drainage or bleeding, you may cover with abdominal pads (purchase at local drug store).  Knee replacements should wrap with an ace wrap.  Your surgical bandage will be removed by you or your home therapist 1 week after surgery.  Steri-strips under the bandage will remain in place until they fall off on their own.  If they are loose, you may gently remove.  Do not remove if pulling causes resistance against the incision.  You will see suture tails sticking out of the ends of the incision.  DO  NOT CUT THEM.  They will fall off when the sutures dissolve.  If they are bothersome, cover with a band aid.    PAIN, SWELLING, BRUISING & CLICKING  Pain and swelling are a natural part of your recovery which is considered normal fur up to a year after surgery.  Symptoms may be treated with movement, ice, compression stockings, elevating your leg, and by following the pain medication regimen as prescribed.  Bruising is normal for several weeks after surgery and will run down the leg over time.  You may ice areas that are tender to help with discomfort.  You are required to wear the provided compression stockings, every day, for 4 weeks following surgery.  Remove the stockings at night and place them back on in the morning.  Pain and swelling may temporarily increase with an increase in activity or exercise.  Use ice after activity.  Audible clicking with movement or exercises is considered normal following joint replacement.  You may also feel decreased sensation or numbness near the incision site.  These are usually normal and may or may not fade over time.     PERSONAL HYGIENE  You may shower upon discharging from the hospital.  Soap and water is permitted to run over the surgical dressing, steri-strips and incision.  Do not scrub directly over these items.  DO NOT soak your incision in a bath, hot tub, pool or pond/lake for a minimum of 8 weeks following your surgery.  DO NOT use lotions, creams, ointments on your wound for a minimum of 6 weeks following your surgery. At that time you may use vitamin E to assist with softening of your incision.      RESTARTING HOME ROUTINE - DIET & MEDICATIONS  Post-operative constipation can result due to a combination of inactivity, anesthesia and pain medication. To help prevent this, you should increase your water and fiber intake. Physical activity such as walking will also help stimulate the bowels.   You may resume your normal diet when you discharge home.  Choose foods  that help promote good bowel habits and prevent constipation, such as foods high in fiber.  You may restart your home medications the following day after your surgery UNLESS you have been given alternate instructions.  Follow the instructions given to you on your hospital discharge instructions for more information regarding your home medications.      IN-HOME PHYSICAL THERAPY & OUTPATIENT PHYSICAL THERAPY  In-home physical therapy will start 1-2 days after you get home from the hospital.    The home care agency will call within the first 24-48 hours to set up their first visit.  Please do not call your care team to inquire during this timeframe.  Continue the exercises you were given in the hospital until you have been seen by in-home therapy.  Make sure to provide a phone number with the ability for the home care staff to leave a message if you do not answer your phone.    Outpatient physical therapy following knee replacement surgery should begin 2-3 weeks after surgery.  You should call to schedule this appointment ASAP if not already scheduled before surgery.  Waiting until you are ready for outpatient physical therapy will cause a delay in your care.  You may choose any outpatient physical therapy location.  Call the office for an order if needed.    EMERGENCIES - WHEN TO CONTACT THE SURGEON'S OFFICE IMMEDIATELY  Fever >101 with chills that has been present for at least 48 hours.   Excessive bleeding from incision that will not slow down. A small amount of drainage is normal and expected.  Once pressure is applied and the area is covered, do not continue to check the area regularly.  This will remove pressure and bleeding will continue.  Leave in place for 4-6 hours.  Signs of infection of incision-excessive drainage that is soaking through your dressing (especially if it is pus-like), redness that is spreading out from the edges of your incision, or increased warmth around the area.  Excruciating pain for  which the pain medication, taken as instructed, is not helping.  Severe calf pain.  Go directly to the emergency room or call 911, if you are experiencing chest pain or difficulty breathing.    ICE & COLD THERAPY INSTRUCTIONS    To assist with pain control and post-op swelling, you should be using ice regularly throughout recovery, especially for the first 6 weeks, regardless of the cold therapy method you use.      Always make sure there is a layer of protection between the cold pad and your skin.    If you are using ICE PACKS or GEL PACKS, you will need to alternate 20 minutes on, 20 minutes off twice per hour.    If you are using an ICE MACHINE, please follow the provided ice machine instructions.  These devices differ from ice or ice packs whereas the mechanism circulates water through tubing and a pad to provide longer periods of cold therapy to the desired site.  You can use your cold devices around the clock for optimal comfort.  We recommend using cold therapy after working with therapy or completing exercises on your own.  There is no set schedule in which you must follow while using cold therapy.  Below are a few points to remember when using a cold therapy device:    You do not need to need to use the 20 on, 20 off method.  Detach the pad from the cooler and ambulate at least once every hour.  You can check your skin under the pad at this time.  You may wear the cold therapy device during periods of sleep including overnight.  If you wake up during the night, you can check the skin at this time.  You do not need to wake up specifically to perform skin checks.  Empty the cooler and pad when device is not in use.  Follow 's instructions for cleaning your cold therapy device.      DISCHARGE MEDICATIONS    PAIN MEDICATION    ___X_ Oxycodone  Oxycodone have been prescribed for post-operative pain control.    These medications will only be refilled ONCE every 7 days for a period of up to 6 weeks  following surgery.  After 6 weeks, you will transition to acetaminophen and over -the- counter anti-inflammatories such as Ibuprofen, Advil or Aleve in conjunction with ICE/COLD THERAPY.   Side effects may be constipation and nausea, vomiting, sleepiness, dizziness, lightheadedness, headache, blurred vision, dry mouth sweating, itching (if you have itching, over-the -counter Benadryl can be used as needed).  You may NOT operate a motor vehicle while taking these medications or have been cleared by your care team.     ___X_ Acetaminophen (Tylenol)  Acetaminophen has been prescribed as an adjunct for pain control. Take two 500 mg tablets every 6 hours for 4 weeks. You will not receive a refill on this medication.  Do not exceed 4000mg of acetaminophen within a 24 hour period.  Side effects may include nausea, heartburn, drowsiness, and headache.    _______ Naproxen has been prescribed as an adjunct anti-inflammatory to assist in pain control.    Take one 500 mg tablet twice a day for 4 weeks.  You will not receive refills on this medication.   Side effects may include nausea.  May not be prescribed if you are on a more potent blood thinner than aspirin or have chronic kidney disease.    BLOOD THINNER    ___X_ Blood Thinner   A blood thinner has been prescribed to prevent blood clots in your leg or lungs. Take as prescribed on the bottle for 4 weeks. You will not receive a refill on this medication.    ANTI NAUSEA    ____ Pantoprazole (Protonix)  Pantoprazole has been prescribed to help with nausea and protect your stomach while taking pain medication. Take one 40 mg tablet once daily for 4 weeks. You will not receive a refill on this medication.    STOOL SOFTENERS    ___X_ Colace (Docusate Sodium) & Miralax (polyethylene glycol)  Take both medications to help with constipation while using the Oxycodone and Tramadol for pain control.  You will not receive a refill on this medication.      You will not receive refills  on the following medications:  Acetaminophen (Tylenol)  Meloxicam  Miralax  Colace  Blood Thinner

## 2023-10-09 ENCOUNTER — HOME CARE VISIT (OUTPATIENT)
Dept: HOME HEALTH SERVICES | Facility: HOME HEALTH | Age: 73
End: 2023-10-09
Payer: MEDICARE

## 2023-10-09 PROCEDURE — G0151 HHCP-SERV OF PT,EA 15 MIN: HCPCS | Mod: HHH

## 2023-10-09 PROCEDURE — 169592 NO-PAY CLAIM PROCEDURE

## 2023-10-09 PROCEDURE — 1090000002 HH PPS REVENUE DEBIT

## 2023-10-09 PROCEDURE — 0023 HH SOC

## 2023-10-09 PROCEDURE — 1090000001 HH PPS REVENUE CREDIT

## 2023-10-09 SDOH — HEALTH STABILITY: PHYSICAL HEALTH: EXERCISE TYPE: RIGHT TOTAL KNEE

## 2023-10-09 ASSESSMENT — ENCOUNTER SYMPTOMS
PAIN LOCATION: RIGHT KNEE
HIGHEST PAIN SEVERITY IN PAST 24 HOURS: 5/10
PAIN LOCATION - PAIN FREQUENCY: CONSTANT
LOWEST PAIN SEVERITY IN PAST 24 HOURS: 0/10
DEPRESSION: 0
LIMITED RANGE OF MOTION: 1
PAIN SEVERITY GOAL: 0/10
OCCASIONAL FEELINGS OF UNSTEADINESS: 1
LOSS OF SENSATION IN FEET: 1
PAIN LOCATION - PAIN SEVERITY: 4/10
SUBJECTIVE PAIN PROGRESSION: GRADUALLY IMPROVING
PAIN LOCATION - EXACERBATING FACTORS: UPRIGHT ACTIVITY
MUSCLE WEAKNESS: 1
PAIN LOCATION - PAIN DURATION: CONSTANT
PAIN LOCATION - PAIN QUALITY: ACHING
PERSON REPORTING PAIN: PATIENT
PAIN LOCATION - RELIEVING FACTORS: ICE, ELEVATION
PAIN: 1

## 2023-10-09 ASSESSMENT — ACTIVITIES OF DAILY LIVING (ADL)
CURRENT_FUNCTION: ONE PERSON
ENTERING_EXITING_HOME: MINIMUM ASSIST
AMBULATION ASSISTANCE: ONE PERSON

## 2023-10-09 NOTE — HOME HEALTH
RIGHT TOTAL KNEE replacement, Dr. Yobany Reynolds.  7 days ago.  RTC OCTOBER 20th.  Outpatient P.T. not yet established.

## 2023-10-10 LAB
LABORATORY COMMENT REPORT: NORMAL
PATH REPORT.FINAL DX SPEC: NORMAL
PATH REPORT.GROSS SPEC: NORMAL
PATH REPORT.TOTAL CANCER: NORMAL

## 2023-10-10 PROCEDURE — 1090000001 HH PPS REVENUE CREDIT

## 2023-10-10 PROCEDURE — 1090000002 HH PPS REVENUE DEBIT

## 2023-10-11 PROCEDURE — 1090000002 HH PPS REVENUE DEBIT

## 2023-10-11 PROCEDURE — 1090000001 HH PPS REVENUE CREDIT

## 2023-10-12 ENCOUNTER — HOME CARE VISIT (OUTPATIENT)
Dept: HOME HEALTH SERVICES | Facility: HOME HEALTH | Age: 73
End: 2023-10-12
Payer: MEDICARE

## 2023-10-12 PROCEDURE — 1090000001 HH PPS REVENUE CREDIT

## 2023-10-12 PROCEDURE — G0151 HHCP-SERV OF PT,EA 15 MIN: HCPCS | Mod: HHH

## 2023-10-12 PROCEDURE — 1090000002 HH PPS REVENUE DEBIT

## 2023-10-12 SDOH — HEALTH STABILITY: PHYSICAL HEALTH
EXERCISE COMMENTS: NO SIGNIFICANT NEWS SINCE MY LAST VISIT.  EXERCISES GOING FINE.  NO FALLS.  LIMITED TRIPS UP AND DOWN THE STEPS WITHIN THE HOUSE.  THAT WAS WITH SUPERVISION OF ONE TO GO UP AND DOWN.  10 DAYS POST OP TODAY.  AROM - 7 TO 110 DEGREES.  TIMED UP AND GO

## 2023-10-12 SDOH — HEALTH STABILITY: PHYSICAL HEALTH
EXERCISE COMMENTS: HE IS ABLE.  HE RESPONDED BY TAKING WHEELED WALKER AND WALKED ABOUT 150 FEET ON LEVEL HARD WOOD, CAREPETED SURFACES BEFORE ASKING TO SIT DOWN.  IT WAS DESCRIBED THAT HE SHOULD ATTEMPT TO BUILD UP HIS ENDURANCE AND HE DEMONSTRATED UNDERSTANDING.

## 2023-10-12 SDOH — HEALTH STABILITY: PHYSICAL HEALTH
EXERCISE COMMENTS: IDES  5.  HIP ABDUCTION  6.  STRAIGHT LEG RAISES  7.  SHORT ARC QUADS       FOR UP TO 20 REPETITIONS, 3 SESSIONS PER DAY.  NO CHANGES MADE TODAY BUT PATIENT VERBAL AND DEMO FOR BEST TECHNIQUE IN PEROFRMING HEP.    PATIENT CHALLENGED TO WALK AS FAR AS

## 2023-10-12 SDOH — HEALTH STABILITY: PHYSICAL HEALTH
EXERCISE COMMENTS: TEST TOOK 24 SECONDS TO COMPLETE.    PATIENT CHALLENGED TO PERFORM PREVIOUSLY ESTABLISHED HOME EXERCISE PROGRAM.  HE RESPONDED BY LAYING DOWN ON HIS FAMILY ROOM COUCH AND PERFORMED SUPINE  1.  ANKLE PUMPS  2.  QUAD SETS  3.  GLUTEAL SETS  4.  HEEL SL

## 2023-10-12 ASSESSMENT — ENCOUNTER SYMPTOMS
PAIN LOCATION - PAIN DURATION: HOURS
PAIN SEVERITY GOAL: 0/10
PAIN LOCATION - PAIN SEVERITY: 4/10
PERSON REPORTING PAIN: PATIENT
PAIN LOCATION - PAIN FREQUENCY: INTERMITTENT
PAIN LOCATION - PAIN QUALITY: ACHING
PAIN: 1
HIGHEST PAIN SEVERITY IN PAST 24 HOURS: 4/10
PAIN LOCATION: RIGHT KNEE
SUBJECTIVE PAIN PROGRESSION: GRADUALLY IMPROVING
LOWEST PAIN SEVERITY IN PAST 24 HOURS: 0/10

## 2023-10-12 NOTE — HOME HEALTH
No significant news since my last visit.  Exercises going fine.  No falls.  Limited trips up and down the steps within the house.  That was with supervision of one to go up and down.  10 days post op today.  AROM - 7 to 110 degrees.  Timed up and go test took 24 seconds to complete.    Patient challenged to perform previously established Home Exercise Program.  He responded by laying down on his family room couch and performed supine  1.  Ankle pumps  2.  Quad sets  3.  Gluteal sets  4.  Heel slides  5.  Hip abduction  6.  Straight Leg Raises  7.  Short arc quads       for up to 20 repetitions, 3 sessions per day.  No changes made today but patient verbal and demo for best technique in perofrming HEP.    Patient challenged to walk as far as he is able.  He responded by taking wheeled walker and walked about 150 feet on level hard wood, carepeted surfaces before asking to sit down.  It was described that he should attempt to build up his endurance and he demonstrated understanding.

## 2023-10-13 PROCEDURE — 1090000001 HH PPS REVENUE CREDIT

## 2023-10-13 PROCEDURE — 1090000002 HH PPS REVENUE DEBIT

## 2023-10-14 PROCEDURE — 1090000001 HH PPS REVENUE CREDIT

## 2023-10-14 PROCEDURE — 1090000002 HH PPS REVENUE DEBIT

## 2023-10-15 PROCEDURE — 1090000002 HH PPS REVENUE DEBIT

## 2023-10-15 PROCEDURE — 1090000001 HH PPS REVENUE CREDIT

## 2023-10-16 ENCOUNTER — HOME CARE VISIT (OUTPATIENT)
Dept: HOME HEALTH SERVICES | Facility: HOME HEALTH | Age: 73
End: 2023-10-16
Payer: MEDICARE

## 2023-10-16 PROCEDURE — G0151 HHCP-SERV OF PT,EA 15 MIN: HCPCS | Mod: HHH

## 2023-10-16 PROCEDURE — 1090000001 HH PPS REVENUE CREDIT

## 2023-10-16 PROCEDURE — 1090000002 HH PPS REVENUE DEBIT

## 2023-10-16 ASSESSMENT — ENCOUNTER SYMPTOMS
PERSON REPORTING PAIN: PATIENT
HIGHEST PAIN SEVERITY IN PAST 24 HOURS: 2/10
PAIN LOCATION - RELIEVING FACTORS: REST
SUBJECTIVE PAIN PROGRESSION: GRADUALLY IMPROVING
PAIN LOCATION - PAIN FREQUENCY: CONSTANT
PAIN LOCATION - PAIN QUALITY: ACHING
LOWEST PAIN SEVERITY IN PAST 24 HOURS: 1/10
PAIN LOCATION - PAIN DURATION: HOURS
PAIN LOCATION - PAIN SEVERITY: 2/10
PAIN LOCATION: RIGHT KNEE
PAIN SEVERITY GOAL: 0/10
PAIN: 1
PAIN LOCATION - EXACERBATING FACTORS: UPRIGHT ACTIVITY

## 2023-10-16 NOTE — HOME HEALTH
Today is 14 days post op.  Having a bad day.  Weaknesses, muscles not as strong as they were yesterday.  I feel more wobbly.    No falls.  I did a number of laps around here and that felt good.  Keeping up with exercise routine.    AROM right knee 3 to 117 degrees.   Timed up and go test took patient 23 seconds to complete.    Full review of written exercises.    deleted short arc quads, modified hip abduction to now be done in sidelying.   Patient needed cues to slow down and place quality ahead of quantity when it comes to his exercises.  Added  full arc quads  standing knee flexion  standing toe raises.  Spoke of possibility of obtaining ankle weights for future exercises.    Walking with rollator walker on level surfaces of the house.  Pattern was improved with walking pattern with rollator walker vs. the wheeled walker.

## 2023-10-17 PROCEDURE — 1090000002 HH PPS REVENUE DEBIT

## 2023-10-17 PROCEDURE — 1090000001 HH PPS REVENUE CREDIT

## 2023-10-18 PROCEDURE — 1090000001 HH PPS REVENUE CREDIT

## 2023-10-18 PROCEDURE — 1090000002 HH PPS REVENUE DEBIT

## 2023-10-19 ENCOUNTER — HOME CARE VISIT (OUTPATIENT)
Dept: HOME HEALTH SERVICES | Facility: HOME HEALTH | Age: 73
End: 2023-10-19
Payer: MEDICARE

## 2023-10-19 PROCEDURE — G0151 HHCP-SERV OF PT,EA 15 MIN: HCPCS | Mod: HHH

## 2023-10-19 PROCEDURE — 1090000002 HH PPS REVENUE DEBIT

## 2023-10-19 PROCEDURE — 1090000001 HH PPS REVENUE CREDIT

## 2023-10-19 SDOH — HEALTH STABILITY: PHYSICAL HEALTH

## 2023-10-19 SDOH — HEALTH STABILITY: PHYSICAL HEALTH
EXERCISE COMMENTS: OF   1.  ANKLE PUMPS  2.  QUAD SETS  3.  GLUTEAL SETS  4.  HEEL SLIDES  5.  HIP ABDUCTION  6.  SLR  7.  SEATED FULL ARC QUADS  8.  STANDING KNEE FLEXION  9.  STANDING TOE RAISES.

## 2023-10-19 ASSESSMENT — ACTIVITIES OF DAILY LIVING (ADL)
AMBULATION ASSISTANCE ON FLAT SURFACES: 1
PHYSICAL TRANSFERS ASSESSED: 1
AMBULATION ASSISTANCE: ONE PERSON
CURRENT_FUNCTION: MINIMUM ASSIST
AMBULATION_DISTANCE/DURATION_TOLERATED: 75
TRANSPORTATION ASSESSED: 1

## 2023-10-19 ASSESSMENT — ENCOUNTER SYMPTOMS
PERSON REPORTING PAIN: PATIENT
MUSCLE WEAKNESS: 1
LIMITED RANGE OF MOTION: 1
SUBJECTIVE PAIN PROGRESSION: UNCHANGED
DENIES PAIN: 1

## 2023-10-19 NOTE — HOME HEALTH
Post op day number 10.  Yobany Reynolds M.D. visit tomorrow.   Today I have gastric issues and I do not feel well.      Timed up and go test took patient 15 seconds to complete.   AROM 7 to 130 degrees R knee.       Full review of written exercises of   1.  Ankle pumps  2.  Quad sets  3.  Gluteal sets  4.  Heel slides  5.  Hip abduction  6.  SLR  7.  Seated full arc quads  8.  Standing knee flexion  9.  Standing toe raises.

## 2023-10-20 ENCOUNTER — OFFICE VISIT (OUTPATIENT)
Dept: ORTHOPEDIC SURGERY | Facility: CLINIC | Age: 73
End: 2023-10-20
Payer: MEDICARE

## 2023-10-20 ENCOUNTER — HOSPITAL ENCOUNTER (OUTPATIENT)
Dept: RADIOLOGY | Facility: HOSPITAL | Age: 73
Discharge: HOME | End: 2023-10-20
Payer: MEDICARE

## 2023-10-20 DIAGNOSIS — M17.0 PRIMARY OSTEOARTHRITIS OF BOTH KNEES: Primary | ICD-10-CM

## 2023-10-20 DIAGNOSIS — M17.0 PRIMARY OSTEOARTHRITIS OF BOTH KNEES: ICD-10-CM

## 2023-10-20 PROCEDURE — 1125F AMNT PAIN NOTED PAIN PRSNT: CPT | Performed by: ORTHOPAEDIC SURGERY

## 2023-10-20 PROCEDURE — 99024 POSTOP FOLLOW-UP VISIT: CPT | Performed by: ORTHOPAEDIC SURGERY

## 2023-10-20 PROCEDURE — 1036F TOBACCO NON-USER: CPT | Performed by: ORTHOPAEDIC SURGERY

## 2023-10-20 PROCEDURE — 1160F RVW MEDS BY RX/DR IN RCRD: CPT | Performed by: ORTHOPAEDIC SURGERY

## 2023-10-20 PROCEDURE — 73562 X-RAY EXAM OF KNEE 3: CPT | Mod: RIGHT SIDE | Performed by: RADIOLOGY

## 2023-10-20 PROCEDURE — 1159F MED LIST DOCD IN RCRD: CPT | Performed by: ORTHOPAEDIC SURGERY

## 2023-10-20 PROCEDURE — 1090000002 HH PPS REVENUE DEBIT

## 2023-10-20 PROCEDURE — 73564 X-RAY EXAM KNEE 4 OR MORE: CPT | Mod: RIGHT SIDE | Performed by: RADIOLOGY

## 2023-10-20 PROCEDURE — 1090000001 HH PPS REVENUE CREDIT

## 2023-10-20 PROCEDURE — 73564 X-RAY EXAM KNEE 4 OR MORE: CPT | Mod: RT,FY

## 2023-10-20 PROCEDURE — 1111F DSCHRG MED/CURRENT MED MERGE: CPT | Performed by: ORTHOPAEDIC SURGERY

## 2023-10-20 NOTE — PROGRESS NOTES
This is a 73 y.o. male who is 2 weeks out from right total knee.  Pain is under control with current medications.  No drainage from his incision no fevers or chills.  Progressing well with physical therapy and appropriately improving in function.  No other new issues or symptoms.    Right knee examination: Surgical incision well approximated no erythema no drainage.  Stable to varus valgus stress range of motion 3-1 10.  Neurovascular tact distally    X-rays of the knee were reviewed independently interpreted by me today, the show stable total knee arthroplasty no fracture dislocation or loosening    Impression plan: 73 y.o. male 2 weeks out from right total knee.  We discussed continuing physical therapy and home exercise program. Pain medications to be used as needed. Assistive devices as needed per PT. We discussed DVT prophylaxis until 6 weeks. No dentist until 3 months and thereafter dental prophylaxis for life. Activity as tolerated weightbearing as tolerated. I have personally reviewed the [facility] report for the patient. This report is scanned into the electronic medical record. I have considered the risks of abuse, dependence, addiction and diversion. Follow up 4 weeks with xrays.

## 2023-10-21 PROCEDURE — 1090000001 HH PPS REVENUE CREDIT

## 2023-10-21 PROCEDURE — 1090000002 HH PPS REVENUE DEBIT

## 2023-10-22 PROCEDURE — 1090000001 HH PPS REVENUE CREDIT

## 2023-10-22 PROCEDURE — 1090000002 HH PPS REVENUE DEBIT

## 2023-10-23 ENCOUNTER — HOME CARE VISIT (OUTPATIENT)
Dept: HOME HEALTH SERVICES | Facility: HOME HEALTH | Age: 73
End: 2023-10-23
Payer: MEDICARE

## 2023-10-23 PROCEDURE — 1090000001 HH PPS REVENUE CREDIT

## 2023-10-23 PROCEDURE — 1090000002 HH PPS REVENUE DEBIT

## 2023-10-23 PROCEDURE — G0151 HHCP-SERV OF PT,EA 15 MIN: HCPCS | Mod: HHH

## 2023-10-23 SDOH — HEALTH STABILITY: PHYSICAL HEALTH
EXERCISE COMMENTS: 1 DAYS POST OP TODAY. WILL SEE DOCTOR 11 16 23.  LAST VISIT WAS LAST WEEK.  BANDAGE CAME OFF.  HE WAS PLEASED.  HE SAID MY FLEXIBILITY IS ABOVE AVERAGE.    AROM TO R KNEE FLEXION 127 DEGREES.   TUG 22 SECONDS TODAY.    ATTEMPTED TO WALK WITH STRAIGHT

## 2023-10-23 SDOH — HEALTH STABILITY: PHYSICAL HEALTH

## 2023-10-23 SDOH — HEALTH STABILITY: PHYSICAL HEALTH
EXERCISE COMMENTS: RCISE PROGRAM.  TODAY DONE VS. 2 LB. ANKLE WEIGHTS ON EACH ANKLE.  1.  ANKLE PUMPS  2.  QUAD SETS  3.  GLUTEAL SETS  4.  HEEL SLIDES  5.  HIP ABDUCTION  6.  STRAIGHT LEG RAISES  7.  SEATED FULL ARC QUADS  8.  STANDING KNEE FLEXION  9.  STANDING TOE R

## 2023-10-23 SDOH — HEALTH STABILITY: PHYSICAL HEALTH: EXERCISE COMMENTS: LEVEL HARD WOOD AND AREA RUG SURFACES BEFORE ASKING TO SIT DUE TO FATIGUE.

## 2023-10-23 ASSESSMENT — ENCOUNTER SYMPTOMS
PAIN LOCATION - RELIEVING FACTORS: REST
HIGHEST PAIN SEVERITY IN PAST 24 HOURS: 3/10
LOWEST PAIN SEVERITY IN PAST 24 HOURS: 0/10
PAIN LOCATION - EXACERBATING FACTORS: UPRIGHT ACTIVITY
PAIN LOCATION - PAIN FREQUENCY: INFREQUENT
PAIN: 1
SUBJECTIVE PAIN PROGRESSION: GRADUALLY IMPROVING
PAIN SEVERITY GOAL: 0/10
MUSCLE WEAKNESS: 1
PAIN LOCATION: RIGHT KNEE
PERSON REPORTING PAIN: PATIENT
PAIN LOCATION - PAIN DURATION: HOURS
PAIN LOCATION - PAIN QUALITY: ACHING
PAIN LOCATION - PAIN SEVERITY: 3/10

## 2023-10-23 ASSESSMENT — ACTIVITIES OF DAILY LIVING (ADL)
AMBULATION_DISTANCE/DURATION_TOLERATED: 300
AMBULATION ASSISTANCE ON FLAT SURFACES: 1
AMBULATION ASSISTANCE: ONE PERSON
CURRENT_FUNCTION: ONE PERSON

## 2023-10-23 NOTE — HOME HEALTH
No signifcant news.  New exercises tolerated.  Looking forward to possible use of cane.  Still using rollator walker, interested in moving to a cane.  No falls, no trips to hospital.  Have been going upstairs at least once a day, sometimes twice.   21 days post op today. Will see doctor 11 16 23.  Last visit was last week.  Bandage came off.  He was pleased.  He said my flexibility is above average.    AROM to R knee flexion 127 degrees.   TUG 22 seconds today.    Attempted to walk with straight cane.  Used straight cane to walk about 30 feet, used nearby furniture for support.  Indicated to patient that he is not ready to use the cane full time and he agreed.  He will use the rollator walker at this time.    Full review of written home exercise program.  Today done vs. 2 lb. ankle weights on each ankle.  1.  Ankle pumps  2.  Quad sets  3.  Gluteal sets  4.  Heel slides  5.  Hip abduction  6.  Straight Leg Raises  7.  Seated full arc quads  8.  Standing knee flexion  9.  Standing toe raises  10.  Standing mini squats        with number 10 being added today.  Patient performed all of them during my visit today.  Patient was then challenged to walk as far as he is able with rollator walker. He responded by walking about 300 feet on level hard wood and area rug surfaces before asking to sit due to fatigue.

## 2023-10-24 PROCEDURE — 1090000002 HH PPS REVENUE DEBIT

## 2023-10-24 PROCEDURE — 1090000001 HH PPS REVENUE CREDIT

## 2023-10-25 PROCEDURE — 1090000001 HH PPS REVENUE CREDIT

## 2023-10-25 PROCEDURE — 1090000002 HH PPS REVENUE DEBIT

## 2023-10-26 ENCOUNTER — HOME CARE VISIT (OUTPATIENT)
Dept: HOME HEALTH SERVICES | Facility: HOME HEALTH | Age: 73
End: 2023-10-26
Payer: MEDICARE

## 2023-10-26 PROCEDURE — 1090000002 HH PPS REVENUE DEBIT

## 2023-10-26 PROCEDURE — 1090000001 HH PPS REVENUE CREDIT

## 2023-10-26 PROCEDURE — G0151 HHCP-SERV OF PT,EA 15 MIN: HCPCS | Mod: HHH

## 2023-10-26 SDOH — HEALTH STABILITY: PHYSICAL HEALTH: EXERCISE TYPE: SUPINE, SEATED, STANDING TKA EXERCISES

## 2023-10-26 ASSESSMENT — ACTIVITIES OF DAILY LIVING (ADL)
CURRENT_FUNCTION: ONE PERSON
AMBULATION ASSISTANCE ON UNEVEN SURFACES: 1
AMBULATION ASSISTANCE: ONE PERSON
AMBULATION_DISTANCE/DURATION_TOLERATED: 300

## 2023-10-26 ASSESSMENT — ENCOUNTER SYMPTOMS
PERSON REPORTING PAIN: PATIENT
LIMITED RANGE OF MOTION: 1
PAIN LOCATION - PAIN SEVERITY: 1/10
LOWEST PAIN SEVERITY IN PAST 24 HOURS: 0/10
MUSCLE WEAKNESS: 1
PAIN LOCATION: BACK
PAIN LOCATION - EXACERBATING FACTORS: UPRIGHT ACTIVITY
PAIN LOCATION - RELIEVING FACTORS: REST
PAIN LOCATION - PAIN FREQUENCY: CONSTANT
PAIN SEVERITY GOAL: 0/10
PAIN LOCATION - PAIN FREQUENCY: INTERMITTENT
PAIN LOCATION - PAIN QUALITY: ACHING
PAIN LOCATION - PAIN SEVERITY: 4/10
PAIN LOCATION: LEFT KNEE
HIGHEST PAIN SEVERITY IN PAST 24 HOURS: 4/10
PAIN LOCATION - PAIN DURATION: CONSTANT
PAIN LOCATION - RELIEVING FACTORS: REST
PAIN LOCATION - PAIN QUALITY: ACHING
PAIN: 1
SUBJECTIVE PAIN PROGRESSION: GRADUALLY IMPROVING
PAIN LOCATION - PAIN DURATION: MINUTES

## 2023-10-26 NOTE — HOME HEALTH
NO significant news.  I am feeling like I have been bed ridden for two weeks.   I have been trying to walk to get back my edge.   24 days since surgery.    TUG 19 seconds to complete.   AROM zero to 128 degrees.    Patient challenged to walk as far as he is able.  He responded by taking wheeled rollator walker and walked to the top of the steps that lead out to the garage, I carried the walker down for him and then he descended those steps with a straight cane in his left hand and holding onto the right descending rail with min assist of one.  Once on the level of the garage floor he recovered the rollator walker and then walked to the passenger side of his [employer] and got in and out of the passenger side with close supervision of one, showing good technique.  After that he walked for about 300 feet before returning back indoors, needing min assist of one to climb the steps that lead indoors and then to the kitchen.    Once indoors he rested and then performed his 2 lb. resisted supine, seated and standing exercises vs. the 2 lb. ankle weights that he owns.  Those are  1.  Ankle pumps  2.  Quad sets  3.  Gluteal sets  4.  Heel slides  5.  Hip abduction  6.  Straight leg raises  7.  Seated full arc quads  8.  Standing knee flexion  9.  Standing toe raises  10.  Standing mini squats         No change to the program today but patient showed better ability to perform them.

## 2023-10-27 PROCEDURE — 1090000002 HH PPS REVENUE DEBIT

## 2023-10-27 PROCEDURE — 1090000001 HH PPS REVENUE CREDIT

## 2023-10-28 PROCEDURE — 1090000001 HH PPS REVENUE CREDIT

## 2023-10-28 PROCEDURE — 1090000002 HH PPS REVENUE DEBIT

## 2023-10-29 ENCOUNTER — HOME CARE VISIT (OUTPATIENT)
Dept: HOME HEALTH SERVICES | Facility: HOME HEALTH | Age: 73
End: 2023-10-29
Payer: MEDICARE

## 2023-10-29 PROCEDURE — 1090000002 HH PPS REVENUE DEBIT

## 2023-10-29 PROCEDURE — 1090000001 HH PPS REVENUE CREDIT

## 2023-10-30 ENCOUNTER — TELEPHONE (OUTPATIENT)
Dept: ORTHOPEDIC SURGERY | Facility: CLINIC | Age: 73
End: 2023-10-30
Payer: MEDICARE

## 2023-10-30 ENCOUNTER — HOME CARE VISIT (OUTPATIENT)
Dept: HOME HEALTH SERVICES | Facility: HOME HEALTH | Age: 73
End: 2023-10-30
Payer: MEDICARE

## 2023-10-30 DIAGNOSIS — M17.11 OSTEOARTHRITIS OF RIGHT KNEE, UNSPECIFIED OSTEOARTHRITIS TYPE: Primary | ICD-10-CM

## 2023-10-30 PROCEDURE — G0151 HHCP-SERV OF PT,EA 15 MIN: HCPCS | Mod: HHH

## 2023-10-30 PROCEDURE — 1090000002 HH PPS REVENUE DEBIT

## 2023-10-30 PROCEDURE — 1090000001 HH PPS REVENUE CREDIT

## 2023-10-30 SDOH — HEALTH STABILITY: PHYSICAL HEALTH
EXERCISE COMMENTS: FULL REVIEW OF WRITTEN HOME EXERCISE PROGRAM OF 2 LB. RESISTED   1.  ANKLE PUMPS  2.  QUAD SETS  3.  GLUTEAL SETS  4.  HEEL SLIDES  5.  HIP ABDUCTION  6.  STRAIGHT LEG RAISES  7.  SEATED FULL ARC QUADS  8.  STANDING KNEE FLEXION  9.  STANDING MINI SQ

## 2023-10-30 SDOH — HEALTH STABILITY: PHYSICAL HEALTH
EXERCISE COMMENTS: UATS  10.  STANDING TOE RAISES         AS HAS BEEN PROMOTED IN THE PAST.  HE NEEDED VERBAL CUES AND ENCOURAGEMENT TO COMPLETE THEM.

## 2023-10-30 ASSESSMENT — ENCOUNTER SYMPTOMS
PAIN LOCATION - EXACERBATING FACTORS: UPRIGHT ACTIVITY
HIGHEST PAIN SEVERITY IN PAST 24 HOURS: 4/10
SUBJECTIVE PAIN PROGRESSION: GRADUALLY IMPROVING
PAIN SEVERITY GOAL: 0/10
PAIN LOCATION - PAIN SEVERITY: 4/10
PAIN LOCATION - RELIEVING FACTORS: REST
LOWEST PAIN SEVERITY IN PAST 24 HOURS: 0/10
PAIN LOCATION - PAIN QUALITY: ACHING
PAIN LOCATION: RIGHT KNEE
PAIN LOCATION - PAIN DURATION: HOURS
MUSCLE WEAKNESS: 1
PAIN LOCATION - PAIN FREQUENCY: INTERMITTENT

## 2023-10-30 ASSESSMENT — ACTIVITIES OF DAILY LIVING (ADL)
AMBULATION ASSISTANCE: ONE PERSON
AMBULATION ASSISTANCE ON FLAT SURFACES: 1
CURRENT_FUNCTION: ONE PERSON

## 2023-10-30 NOTE — HOME HEALTH
Will set up outpatient P.T. later today.  No falls, trips to hospital.  Plans to attend outpatient P.T. at  [facility].    AROM to 127 degrees.  Timed up and go test took is 17 seconds.    Full review of written home exercise program of 2 lb. resisted   1.  Ankle pumps  2.  Quad sets  3.  Gluteal sets  4.  Heel slides  5.  Hip abduction  6.  Straight leg raises  7.  Seated full arc quads  8.  Standing knee flexion  9.  Standing mini squats  10.  Standing toe raises         as has been promoted in the past.  He needed verbal cues and encouragement to complete them.

## 2023-10-30 NOTE — TELEPHONE ENCOUNTER
10/20/23 pov rt tka  Patient needs PT order placed has upcoming appointment in [place] for PT. Will call once in. Thank you.

## 2023-10-31 PROCEDURE — 1090000002 HH PPS REVENUE DEBIT

## 2023-10-31 PROCEDURE — 1090000001 HH PPS REVENUE CREDIT

## 2023-10-31 ASSESSMENT — ACTIVITIES OF DAILY LIVING (ADL): OASIS_M1830: 05

## 2023-11-01 PROCEDURE — 1090000002 HH PPS REVENUE DEBIT

## 2023-11-01 PROCEDURE — 1090000001 HH PPS REVENUE CREDIT

## 2023-11-02 ENCOUNTER — HOME CARE VISIT (OUTPATIENT)
Dept: HOME HEALTH SERVICES | Facility: HOME HEALTH | Age: 73
End: 2023-11-02
Payer: MEDICARE

## 2023-11-02 PROCEDURE — 1090000002 HH PPS REVENUE DEBIT

## 2023-11-02 PROCEDURE — 1090000001 HH PPS REVENUE CREDIT

## 2023-11-02 PROCEDURE — G0151 HHCP-SERV OF PT,EA 15 MIN: HCPCS | Mod: HHH

## 2023-11-02 ASSESSMENT — ENCOUNTER SYMPTOMS
HIGHEST PAIN SEVERITY IN PAST 24 HOURS: 4/10
PAIN LOCATION - EXACERBATING FACTORS: UPRIGHT ACTIVITY
OCCASIONAL FEELINGS OF UNSTEADINESS: 1
PAIN LOCATION - RELIEVING FACTORS: REST
PAIN LOCATION: RIGHT KNEE
LOSS OF SENSATION IN FEET: 0
DEPRESSION: 0
PAIN LOCATION - PAIN QUALITY: ACHING
PAIN LOCATION - PAIN SEVERITY: 4/10
PAIN LOCATION - PAIN FREQUENCY: INTERMITTENT
PAIN LOCATION - PAIN DURATION: HOURS
SUBJECTIVE PAIN PROGRESSION: RAPIDLY IMPROVING
PAIN SEVERITY GOAL: 0/10
LOWEST PAIN SEVERITY IN PAST 24 HOURS: 0/10

## 2023-11-02 ASSESSMENT — ACTIVITIES OF DAILY LIVING (ADL)
OASIS_M1830: 00
HOME_HEALTH_OASIS: 00

## 2023-11-02 NOTE — HOME HEALTH
Outpatient P.T. next tuesday with Talya at  [facility].  RTC Dr. Yobany Reynolds 11 16 23.    AROM 2 to 127 degrees.   Timed up and go test took is 15 seconds.   Full review of written home exercise program of 2 lb. resisted   1. Ankle pumps   2. Quad sets   3. Gluteal sets   4. Heel slides   5. Hip abduction   6. Straight leg raises   7. Seated full arc quads   8. Standing knee flexion   9. Standing mini squats   10. Standing toe raises         as has been promoted in the past. He needed verbal cues and encouragement to complete them.

## 2023-11-03 PROCEDURE — 1090000001 HH PPS REVENUE CREDIT

## 2023-11-03 PROCEDURE — 1090000002 HH PPS REVENUE DEBIT

## 2023-11-04 PROCEDURE — 1090000001 HH PPS REVENUE CREDIT

## 2023-11-04 PROCEDURE — 1090000002 HH PPS REVENUE DEBIT

## 2023-11-05 PROCEDURE — 1090000002 HH PPS REVENUE DEBIT

## 2023-11-05 PROCEDURE — 1090000001 HH PPS REVENUE CREDIT

## 2023-11-06 PROCEDURE — 1090000002 HH PPS REVENUE DEBIT

## 2023-11-06 PROCEDURE — 1090000001 HH PPS REVENUE CREDIT

## 2023-11-07 ENCOUNTER — EVALUATION (OUTPATIENT)
Dept: PHYSICAL THERAPY | Facility: CLINIC | Age: 73
End: 2023-11-07
Payer: MEDICARE

## 2023-11-07 DIAGNOSIS — M25.561 ACUTE POSTOPERATIVE PAIN OF RIGHT KNEE: Primary | ICD-10-CM

## 2023-11-07 DIAGNOSIS — M17.11 OSTEOARTHRITIS OF RIGHT KNEE, UNSPECIFIED OSTEOARTHRITIS TYPE: ICD-10-CM

## 2023-11-07 DIAGNOSIS — G89.18 ACUTE POSTOPERATIVE PAIN OF RIGHT KNEE: Primary | ICD-10-CM

## 2023-11-07 DIAGNOSIS — R26.2 DIFFICULTY WALKING: ICD-10-CM

## 2023-11-07 PROCEDURE — 1090000001 HH PPS REVENUE CREDIT

## 2023-11-07 PROCEDURE — 97110 THERAPEUTIC EXERCISES: CPT | Mod: GP

## 2023-11-07 PROCEDURE — 97162 PT EVAL MOD COMPLEX 30 MIN: CPT | Mod: GP

## 2023-11-07 PROCEDURE — 1090000002 HH PPS REVENUE DEBIT

## 2023-11-07 ASSESSMENT — ACTIVITIES OF DAILY LIVING (ADL)
EFFECT OF PAIN ON DAILY ACTIVITIES: MINIMAL
ADL_ASSISTANCE: INDEPENDENT

## 2023-11-07 ASSESSMENT — PAIN - FUNCTIONAL ASSESSMENT: PAIN_FUNCTIONAL_ASSESSMENT: 0-10

## 2023-11-07 ASSESSMENT — PAIN SCALES - GENERAL: PAINLEVEL_OUTOF10: 4

## 2023-11-07 ASSESSMENT — ENCOUNTER SYMPTOMS
OCCASIONAL FEELINGS OF UNSTEADINESS: 1
DEPRESSION: 0
LOSS OF SENSATION IN FEET: 0

## 2023-11-07 ASSESSMENT — PAIN DESCRIPTION - DESCRIPTORS: DESCRIPTORS: ACHING

## 2023-11-07 NOTE — PROGRESS NOTES
"Physical Therapy Evaluation and Treatment      Patient Name: Edilson Cantu  MRN: [id number]  Today's Date: 11/7/2023  Time Calculation  Start Time: 1326  Stop Time: 1416  Time Calculation (min): 50 min      Assessment:  PT Assessment Results: Decreased strength, Decreased range of motion, Decreased endurance, Impaired balance, Decreased coordination, Decreased safety awareness, Pain  Rehab Prognosis: Good  Pt w/ onset of R TKR and longstanding PD affecting gait, activity tolerance, ADL/IADL and functional mobility; presents w/ palpable pain, decreased ROM/flexibility, reduced core and support mm strength, altered gait, balance, transfer ability, decreased postural/body mechanics awareness, fall potential; Skilled PT is indicated to improve pt's functional status and educate in self-management of condition             Plan:  Treatment/Interventions: Cryotherapy, Education/ Instruction, Electrical stimulation, Gait training, Manual therapy, Neuromuscular re-education, Therapeutic exercises, Vasopneumatic device (modalities prn)  PT Plan: Skilled PT  PT Frequency: 2 times per week  Duration: 10 weeks  Onset Date: 10/02/23  Certification Period Start Date: 11/07/23  Certification Period End Date: 02/05/24  Number of Treatments Authorized: 30  Rehab Potential: Good (for post TKR rehab)  Plan of Care Agreement: Patient    Current Problem:   1. Acute postoperative pain of right knee  Follow Up In Physical Therapy      2. Osteoarthritis of right knee, unspecified osteoarthritis type  Referral to Physical Therapy      3. Difficulty walking            Subjective    General:  General  Reason for Referral: Pt referred to PT for ongoing post op R TKR Rx  Referred By: Dr Gareth Reynolds  Past Medical History Relevant to Rehab: Pt w/ h/o PD, dx in 2020- had sx for yrs; dealing w/ ongoing falls/balance issues related to this- has fallen \"many\" times since surgery- pt feels this is his bigger problem  Precautions:  Precautions  STEADI " "Fall Risk Score (The score of 4 or more indicates an increased risk of falling): 6  Precautions Comment: high fall risk, post op R TKR       Pain:  Pain Assessment  Pain Assessment: 0-10  Pain Score: 4  Pain Type: Acute pain, Surgical pain  Pain Location: Knee  Pain Orientation: Right  Pain Descriptors: Aching  Pain Frequency: Intermittent  Pain Onset: Other (Comment)  Clinical Progression:  (post op pain)  Effect of Pain on Daily Activities: minimal  Patient's Stated Pain Goal: No pain  Pain Interventions: Home medication, Cold applied (stopped using ice a few days ago)  Home Living:   Pt lives w/ spouse in [facility]- steps to enter w/ rail; steps to BR- rail; shower is walk in w/ rails, has rails on toilet   Prior Level of Function:  Prior Function Per Pt/Caregiver Report  Level of [place]: Independent with homemaking with ambulation  Receives Help From: Family  ADL Assistance: Independent  Homemaking Assistance: Needs assistance  Ambulatory Assistance: Independent  Vocational: Retired (with rollator)  Leisure: Pt wants to be able to walk w/ less falling and w/o AD if able; tends to be sedentary due to PD, still involved in a \"side hussle\"  Prior Function Comments: Pt has needed an AD for 6 nos due to declining status    Objective      General Assessments:  Posture Comment: signif kyphotic, flexed at cerv spine- if attempts to hold erect in standing- falls backward    Range of Motion Comments: R knee 5--> 116 AROM w/ mild pain end ranges; gross LE AROM WFL except B DF to 0,feet invert w/ DF attempt; PROM W/ rigidity BLE    Strength Comments: R knee flex 4-/5, ext 3+/5- increasing quad lag w/ SLR; hip strength 4/5 grossly, L ankle DF 3/5, R 3+/5    Flexibility Comment: tight B HS at -35; DF to 0 bilat, HF 25% limited bilat; B hip adductors 25% limited    Palpation Comment: mild palpable pain globally R knee; circumference at knee 42.5 cm R, 38 cm L   Functional Assessments:  Gait Comment: Pt using rollator for " mobility, very flxed posture, LEs adducted, drags B feet w/ shuffle pattern  , Balance Comment: Pt can stand unsupported for 1 minute, cannot lift either LE w/o LOB, cannot  tandem    , Bed Mobility Comment: rolling  and supine to sit slow/indep, struggles to lift LE onto table  , and Transfers Comment: heavy reliance on UE for sit to stand            Outcome Measures:  Other Measures  Lower Extremity Funtional Score (LEFS): 32/80     Treatments:  Therapeutic Exercise  Therapeutic Exercise Performed: Yes  Therapeutic Exercise Activity 1: supine heel slide F/E x 10  Therapeutic Exercise Activity 2: supine SLR w/ QS 1st 3 x 5  Therapeutic Exercise Activity 3: SAQ 2#- 5 sec holds x 10  Therapeutic Exercise Activity 4: seated LAQ 2# x 10  Therapeutic Exercise Activity 5: seated F/E heel slide x 10  Therapeutic Exercise Activity 6: standing march at table x 10  Therapeutic Exercise Activity 7: standing H/T raises x 10  Therapeutic Exercise Activity 8: R/L hip ext x 10  Therapeutic Exercise Activity 9: R knee flex in standing 5 sec x 10  Therapeutic Exercise Activity 10: standing R/L weight shifting w/ UE support x 2'      OP EDUCATION:  Education  Individual(s) Educated: Patient  Education Provided: Fall Risk, Home Exercise Program, Home Safety, POC, Post-Op Precautions, Posture, Signs/Symptoms of Infection  Risk and Benefits Discussed with Patient/Caregiver/Other: yes  Patient/Caregiver Demonstrated Understanding: yes  Patient Response to Education: Patient/Caregiver Verbalized Understanding of Information  Education Comment: Pt brought SPC in- was not safe in using it and advised cont use of rollator  Access Code: [email or web address]  URL: [email or web address]  Date: 11/07/2023  Prepared by: Talya Britton    Exercises  - Seated Heel Slide  - 1-2 x daily - 7 x weekly - 1-2 sets - 10 reps  - Supine Straight Leg Raises  - 1-2 x daily - 7 x weekly - 2-3 sets - 5 reps  - Supine Quad Set on Towel Roll  -  1-2 x daily - 7 x weekly - 1-2 sets - 10 reps  - Seated Knee Extension AROM  - 1-2 x daily - 7 x weekly - 1-2 sets - 10 reps - 3-5 hold  - Hip Flexion  - 1-2 x daily - 7 x weekly - 1-2 sets - 10 reps  - Supine Short Arc Quad  - 1-2 x daily - 7 x weekly - 1-2 sets - 10 reps - 5 hold  - Standing Marching  - 1-2 x daily - 7 x weekly - 1-2 sets - 10 reps  - Standing Knee Flexion  - 1-2 x daily - 7 x weekly - 1-2 sets - 10 reps  - Standing Hip Extension  - 1-2 x daily - 7 x weekly - 1-2 sets - 5 reps  - Heel Toe Raises with Unilateral Counter Support  - 1-2 x daily - 7 x weekly - 1-2 sets - 10 reps  Goals:  Active       PT Problem       PT Goal 1       Start:  11/07/23    Expected End:  12/22/23       STG(4 wks):  - Pt. will have at least 10% increase in flexibility of affected joints/structures being treated- BLE  - Pt will have improved postural awareness for daily activities and exercises as evidenced by self corrections for improper positions/movements  - Pt. will be able to perform safe and independent transfers/bed mobility with less effort  - Pt will be independent and compliant with HEP  - Pt will have return of 0--> 120 AROM of R knee           PT Goal 2       Start:  11/07/23    Expected End:  02/05/24        LTG( 8 wks)  - Progress HEP and OMID within pt tolerance to goal attainment, including return to ADL/IADL and functional activities w/ less pain  - Pt. will have improved balance as evidenced by increased semi tandem stance time of at least 10 sec for improved everyday balance and less fall potential  - Pt. will have safe, independent ambulation with least restrictive assistive device for community distances and stairs/curbs with normalized gait pattern  - Pt. will have reduction of pain to no greater than 2- 3 of 10 with ex. and activities  - Pt. will have increased strength of at least 1/3 muscle grade in affected muscle groups- R knee support mm  - Pt. will have at least 15 point improvement in  functional test score- LFES-as evidenced by self report of improved ability to perform every day activities w/ greater ease and less pain  - Pt will be supervised and a gait belt will be used if needed to assure safety in the clinic during treatment

## 2023-11-07 NOTE — Clinical Note
November 7, 2023     Patient: Edilson Cantu   YOB: 1950   Date of Visit: 11/7/2023       To Whom It May Concern:    It is my medical opinion that Edilson Cnatu {Work release (duty restriction):98896}.    If you have any questions or concerns, please don't hesitate to call.         Sincerely,        Talya Britton, PT    CC: No Recipients

## 2023-11-07 NOTE — Clinical Note
November 7, 2023     Patient: Edilson Cantu   YOB: 1950   Date of Visit: 11/7/2023       To Whom it May Concern:    Edilson Cantu was seen in my clinic on 11/7/2023. He {Return to school/sport:08284}.    If you have any questions or concerns, please don't hesitate to call.         Sincerely,          Talya Britton, PT        CC: No Recipients

## 2023-11-10 ENCOUNTER — TREATMENT (OUTPATIENT)
Dept: PHYSICAL THERAPY | Facility: CLINIC | Age: 73
End: 2023-11-10
Payer: MEDICARE

## 2023-11-10 DIAGNOSIS — M25.561 ACUTE POSTOPERATIVE PAIN OF RIGHT KNEE: ICD-10-CM

## 2023-11-10 DIAGNOSIS — G89.18 ACUTE POSTOPERATIVE PAIN OF RIGHT KNEE: ICD-10-CM

## 2023-11-10 PROCEDURE — 97530 THERAPEUTIC ACTIVITIES: CPT | Mod: GP | Performed by: PHYSICAL MEDICINE & REHABILITATION

## 2023-11-10 PROCEDURE — 97110 THERAPEUTIC EXERCISES: CPT | Mod: GP | Performed by: PHYSICAL MEDICINE & REHABILITATION

## 2023-11-10 PROCEDURE — 97112 NEUROMUSCULAR REEDUCATION: CPT | Mod: GP | Performed by: PHYSICAL MEDICINE & REHABILITATION

## 2023-11-10 ASSESSMENT — PAIN SCALES - GENERAL: PAINLEVEL_OUTOF10: 0 - NO PAIN

## 2023-11-10 ASSESSMENT — PAIN - FUNCTIONAL ASSESSMENT: PAIN_FUNCTIONAL_ASSESSMENT: 0-10

## 2023-11-10 NOTE — PROGRESS NOTES
"  Physical Therapy Treatment    Patient Name: Edilson Cantu  MRN: [id number]  Today's Date: 11/10/2023  Time Calculation  Start Time: 1152  Stop Time: 1245  Time Calculation (min): 53 min  Current Problem  1. Acute postoperative pain of right knee  Follow Up In Physical Therapy          Insurance:  Number of Treatments Authorized: 2 of 30  Certification Period Start Date: 11/07/23  Certification Period End Date: 02/05/24    Subjective   General  Reason for Referral: Pt referred to PT for ongoing post op R TKR Rx  Referred By: Dr Gareth Reynolds  Past Medical History Relevant to Rehab: PMH reviewed by LS  General Comment: Patient reports little to no pain in his right knee this visit. He has remained adherent to his HEP thus far without any issues.    Performing HEP?: Yes    Precautions  Precautions  STEADI Fall Risk Score (The score of 4 or more indicates an increased risk of falling): 6  Precautions Comment: high fall risk, post op R TKR  Pain  Pain Assessment: 0-10  Pain Score: 0 - No pain    Objective   KNEE    Knee AROM   R knee flexion: (140°): 122  L knee flexion: (140°): 126  R knee extension: (0°): Lacking 4  L knee extension: (0°): Lacking 2    Treatments:    Therapeutic Exercise  Therapeutic Exercise Activity 1: SciFit: L2x5'  Therapeutic Exercise Activity 2: Slantboard Stretch: 2x1'  Therapeutic Exercise Activity 3: Heel Slides: x20 w strap  Therapeutic Exercise Activity 4: Quad Sets w heel prop: 2x10x5\" holds  Therapeutic Exercise Activity 5: SLR: 2x10  Therapeutic Exercise Activity 6: Seated LAQ: 3x10 w 3# ankle weights  Therapeutic Exercise Activity 7: Seated Hip Flexion: 3x10 each leg w 3#    Balance/Neuromuscular Re-Education  Balance/Neuromuscular Re-Education Activity 1: Standing Marches at table: x20 each leg  Balance/Neuromuscular Re-Education Activity 2: Side stepping along edge of table: x5 down and back on airex    Manual Therapy  Manual Therapy Activity 1: Tibiofemoral Mobilizations for knee " "flexion: Grade II/III    Therapeutic Activity  Therapeutic Activity 1: Sit to stands from elevated table: 2x10 from 23 in  Therapeutic Activity 2: Forward step ups: 2x10 each leg onto 4\" box    Assessment:  PT Assessment  Assessment Comment: Today's session focused on progressing activities to improve patient's lower extremity strength, balance and functional ability. Patient demonstrated great effort toward today's progressions. Improved right knee AROM was measured during today's session. Moderate levels of fatigue were noted at the conclusion of the session, patient reported feeling adequately challenged. No increased pain reported. Overall response toward today's session was great.    Plan:  Continue with current POC. Progress strength, balance and functional ability as tolerated.  OP PT Plan  PT Frequency: 2 times per week  Duration: 10 weeks  Onset Date: 10/02/23  Certification Period Start Date: 11/07/23  Certification Period End Date: 02/05/24  Number of Treatments Authorized: 2 of 30    Goals:  Active       PT Problem       PT Goal 1       Start:  11/07/23    Expected End:  12/22/23       STG(4 wks):  - Pt. will have at least 10% increase in flexibility of affected joints/structures being treated- BLE  - Pt will have improved postural awareness for daily activities and exercises as evidenced by self corrections for improper positions/movements  - Pt. will be able to perform safe and independent transfers/bed mobility with less effort  - Pt will be independent and compliant with HEP  - Pt will have return of 0--> 120 AROM of R knee           PT Goal 2       Start:  11/07/23    Expected End:  02/05/24        LTG( 8 wks)  - Progress HEP and OMID within pt tolerance to goal attainment, including return to ADL/IADL and functional activities w/ less pain  - Pt. will have improved balance as evidenced by increased semi tandem stance time of at least 10 sec for improved everyday balance and less fall potential  - " Pt. will have safe, independent ambulation with least restrictive assistive device for community distances and stairs/curbs with normalized gait pattern  - Pt. will have reduction of pain to no greater than 2- 3 of 10 with ex. and activities  - Pt. will have increased strength of at least 1/3 muscle grade in affected muscle groups- R knee support mm  - Pt. will have at least 15 point improvement in functional test score- LFES-as evidenced by self report of improved ability to perform every day activities w/ greater ease and less pain  - Pt will be supervised and a gait belt will be used if needed to assure safety in the clinic during treatment                  Zachery Foster, PT

## 2023-11-16 ENCOUNTER — OFFICE VISIT (OUTPATIENT)
Dept: ORTHOPEDIC SURGERY | Facility: CLINIC | Age: 73
End: 2023-11-16
Payer: MEDICARE

## 2023-11-16 ENCOUNTER — ANCILLARY PROCEDURE (OUTPATIENT)
Dept: RADIOLOGY | Facility: CLINIC | Age: 73
End: 2023-11-16
Payer: MEDICARE

## 2023-11-16 DIAGNOSIS — M17.0 PRIMARY OSTEOARTHRITIS OF BOTH KNEES: ICD-10-CM

## 2023-11-16 DIAGNOSIS — M17.0 PRIMARY OSTEOARTHRITIS OF BOTH KNEES: Primary | ICD-10-CM

## 2023-11-16 PROCEDURE — 73564 X-RAY EXAM KNEE 4 OR MORE: CPT | Mod: RIGHT SIDE | Performed by: RADIOLOGY

## 2023-11-16 PROCEDURE — 1160F RVW MEDS BY RX/DR IN RCRD: CPT | Performed by: ORTHOPAEDIC SURGERY

## 2023-11-16 PROCEDURE — 1126F AMNT PAIN NOTED NONE PRSNT: CPT | Performed by: ORTHOPAEDIC SURGERY

## 2023-11-16 PROCEDURE — 1159F MED LIST DOCD IN RCRD: CPT | Performed by: ORTHOPAEDIC SURGERY

## 2023-11-16 PROCEDURE — 73564 X-RAY EXAM KNEE 4 OR MORE: CPT | Mod: RT,FY

## 2023-11-16 PROCEDURE — 1036F TOBACCO NON-USER: CPT | Performed by: ORTHOPAEDIC SURGERY

## 2023-11-16 PROCEDURE — 73562 X-RAY EXAM OF KNEE 3: CPT | Mod: RIGHT SIDE | Performed by: RADIOLOGY

## 2023-11-16 PROCEDURE — 99024 POSTOP FOLLOW-UP VISIT: CPT | Performed by: ORTHOPAEDIC SURGERY

## 2023-11-16 NOTE — PROGRESS NOTES
This is a 73 y.o. male who is 6 weeks out from right total knee.  No drainage from his incision no fevers or chills.  Progressing well with physical therapy and appropriately improving in function.  No other new issues or symptoms.    Right knee examination: Surgical incision well healed no erythema no drainage.  Stable to varus valgus stress range of motion 3-1 10.  Neurovascular tact distally    X-rays of the knee were reviewed independently interpreted by me today, the show stable total knee arthroplasty no fracture dislocation or loosening    Impression plan: 73 y.o. male 6 weeks out from right total knee.  We discussed continuing physical therapy and home exercise program. Pain medications to be used as needed. Assistive devices as needed per PT. We discussed DVT prophylaxis until 6 weeks. No dentist until 3 months and thereafter dental prophylaxis for life. Activity as tolerated weightbearing as tolerated. I have personally reviewed the OARRS report for the patient. This report is scanned into the electronic medical record. I have considered the risks of abuse, dependence, addiction and diversion. Follow up 3 months with xrays.

## 2023-11-17 ENCOUNTER — TREATMENT (OUTPATIENT)
Dept: PHYSICAL THERAPY | Facility: CLINIC | Age: 73
End: 2023-11-17
Payer: MEDICARE

## 2023-11-17 DIAGNOSIS — G89.18 ACUTE POSTOPERATIVE PAIN OF RIGHT KNEE: ICD-10-CM

## 2023-11-17 DIAGNOSIS — M25.561 ACUTE POSTOPERATIVE PAIN OF RIGHT KNEE: ICD-10-CM

## 2023-11-17 PROCEDURE — 97530 THERAPEUTIC ACTIVITIES: CPT | Mod: GP | Performed by: PHYSICAL MEDICINE & REHABILITATION

## 2023-11-17 PROCEDURE — 97112 NEUROMUSCULAR REEDUCATION: CPT | Mod: GP | Performed by: PHYSICAL MEDICINE & REHABILITATION

## 2023-11-17 PROCEDURE — 97110 THERAPEUTIC EXERCISES: CPT | Mod: GP | Performed by: PHYSICAL MEDICINE & REHABILITATION

## 2023-11-17 ASSESSMENT — PAIN SCALES - GENERAL: PAINLEVEL_OUTOF10: 0 - NO PAIN

## 2023-11-17 ASSESSMENT — PAIN - FUNCTIONAL ASSESSMENT: PAIN_FUNCTIONAL_ASSESSMENT: 0-10

## 2023-11-17 NOTE — PROGRESS NOTES
"  Physical Therapy Treatment    Patient Name: Edilson Cantu  MRN: [id number]  Today's Date: 11/17/2023  Time Calculation  Start Time: 1408  Stop Time: 1450  Time Calculation (min): 42 min  Current Problem  1. Acute postoperative pain of right knee  Follow Up In Physical Therapy          Insurance:  Number of Treatments Authorized: 3 of 30  Certification Period Start Date: 11/07/23  Certification Period End Date: 02/05/24    Subjective   General  Reason for Referral: Pt referred to PT for ongoing post op R TKR Rx  Referred By: Dr Gareth Reynolds  Past Medical History Relevant to Rehab: PMH reviewed by LS  General Comment: Patient reports no new complaints this visit. No increased pain or adverse responses following his previous session.      Performing HEP?: Yes    Precautions  Precautions  STEADI Fall Risk Score (The score of 4 or more indicates an increased risk of falling): 6  Precautions Comment: high fall risk, post op R TKR  Pain  Pain Assessment: 0-10  Pain Score: 0 - No pain    Objective   Treatments:    Therapeutic Exercise  Therapeutic Exercise Activity 1: SciFit: L2x5'  Therapeutic Exercise Activity 2: Slantboard Stretch: 2x1'  Therapeutic Exercise Activity 3: Seated LAQ: 3x10 w 4# ankle weights  Therapeutic Exercise Activity 4: Seated Hip Flexion: 3x10 each leg w 4#    Balance/Neuromuscular Re-Education  Balance/Neuromuscular Re-Education Activity 1: Standing Marches at table: x20 each leg  Balance/Neuromuscular Re-Education Activity 2: Side stepping along edge of table: x5 down and back on airex    Therapeutic Activity  Therapeutic Activity 1: Sit to stands from elevated table: 2x10 from 22 in  Therapeutic Activity 2: Forward step ups: 2x10 each leg onto 6\" box (CGA with gait belt)      Assessment:   Today's visit focused on progressing activities to improve patient's bilateral lower extremity strength, balance and functional ability. Patient demonstrated good effort toward today's progressions. No increased " pain reported at the conclusion of the session. Overall response toward today's interventions was great.      Plan:  Continue with current POC. Progress strength and functional ability as tolerated.   OP PT Plan  PT Frequency: 2 times per week  Duration: 10 weeks  Onset Date: 10/02/23  Certification Period Start Date: 11/07/23  Certification Period End Date: 02/05/24  Number of Treatments Authorized: 3 of 30    Goals:  Active       PT Problem       PT Goal 1       Start:  11/07/23    Expected End:  12/22/23       STG(4 wks):  - Pt. will have at least 10% increase in flexibility of affected joints/structures being treated- BLE  - Pt will have improved postural awareness for daily activities and exercises as evidenced by self corrections for improper positions/movements  - Pt. will be able to perform safe and independent transfers/bed mobility with less effort  - Pt will be independent and compliant with HEP  - Pt will have return of 0--> 120 AROM of R knee           PT Goal 2       Start:  11/07/23    Expected End:  02/05/24        LTG( 8 wks)  - Progress HEP and OMID within pt tolerance to goal attainment, including return to ADL/IADL and functional activities w/ less pain  - Pt. will have improved balance as evidenced by increased semi tandem stance time of at least 10 sec for improved everyday balance and less fall potential  - Pt. will have safe, independent ambulation with least restrictive assistive device for community distances and stairs/curbs with normalized gait pattern  - Pt. will have reduction of pain to no greater than 2- 3 of 10 with ex. and activities  - Pt. will have increased strength of at least 1/3 muscle grade in affected muscle groups- R knee support mm  - Pt. will have at least 15 point improvement in functional test score- LFES-as evidenced by self report of improved ability to perform every day activities w/ greater ease and less pain  - Pt will be supervised and a gait belt will be used if  needed to assure safety in the clinic during treatment                  Zachery Foster, PT   all other ROS negative except as per HPI

## 2023-11-20 ENCOUNTER — TREATMENT (OUTPATIENT)
Dept: PHYSICAL THERAPY | Facility: CLINIC | Age: 73
End: 2023-11-20
Payer: MEDICARE

## 2023-11-20 DIAGNOSIS — M25.561 ACUTE POSTOPERATIVE PAIN OF RIGHT KNEE: ICD-10-CM

## 2023-11-20 DIAGNOSIS — G89.18 ACUTE POSTOPERATIVE PAIN OF RIGHT KNEE: ICD-10-CM

## 2023-11-20 PROCEDURE — 97112 NEUROMUSCULAR REEDUCATION: CPT | Mod: GP,CQ

## 2023-11-20 PROCEDURE — 97110 THERAPEUTIC EXERCISES: CPT | Mod: GP,CQ

## 2023-11-20 ASSESSMENT — PAIN SCALES - GENERAL: PAINLEVEL_OUTOF10: 0 - NO PAIN

## 2023-11-20 NOTE — PROGRESS NOTES
"  Physical Therapy Treatment    Patient Name: Edilson Cantu  MRN: [id number]  Today's Date: 11/20/2023  Time Calculation  Start Time: 0948  Stop Time: 1030  Time Calculation (min): 42 min  Current Problem  1. Acute postoperative pain of right knee  Follow Up In Physical Therapy          Insurance:  Payor: [employer] / Plan: [employer] / Product Type: *No Product type* /   Number of Treatments Authorized: 4 of 30  Certification Period Start Date: 11/07/23  Certification Period End Date: 02/05/24    Subjective   General  Reason for Referral: Pt referred to PT for ongoing post op R TKR Rx  Referred By: Dr Gareth Reynolds  Past Medical History Relevant to Rehab: PMH reviewed by SM  General Comment: Patient reports no new complaints this visit. No increased pain or adverse responses following his previous session.    Performing HEP?: Yes    Precautions  Precautions  Precautions Comment: high fall risk, post op R TKR  Pain  Pain Score: 0 - No pain    Objective       General Observation  General Observation: PT AMB WITH ROLLATOR, FWD POSTURE AT THE WAIST         Treatments:    Therapeutic Exercise  Therapeutic Exercise Activity 1: SciFit: MAN L2 X 6MIN  Therapeutic Exercise Activity 2: Slantboard Stretch: 2x1'  Therapeutic Exercise Activity 3: HEEL RAISES X 1 MIN  Therapeutic Exercise Activity 4: SIT TO STAND // BAR 2 BLUE PADS X 1 MIN, 1 BLUE PAD X 1 MIN  Therapeutic Exercise Activity 5: FOOTWORM YELLOW LOOP ALONG // BAR X 2 MIN  Therapeutic Exercise Activity 6: HAM CURLS 50# X 2 MIN  Therapeutic Exercise Activity 7: LAQ R/L ALT 4# X 2 MIN    Balance/Neuromuscular Re-Education  Balance/Neuromuscular Re-Education Activity 1: Standing Marches 8\" STEP // BAR X 2 MIN  Balance/Neuromuscular Re-Education Activity 2: AIRE[employer] BEAM BALANCING // BAR X 3 MIN  Balance/Neuromuscular Re-Education Activity 3: GAIT TRAINING WORKING ON SLOW, WIDER STEPS AND BEING UPRIGHT    Manual Therapy  Manual Therapy Performed: No               "     OP EDUCATION:  Outpatient Education  Education Comment: Access Code: [email or web address]  URL: [email or web address]  Date: 11/20/2023  Prepared by: Raulito Harley    Exercises  - Standing March with Counter Support  - 1 x daily - 7 x weekly - 1 sets - 20 reps  - Standing with Head Rotation  - 1 x daily - 7 x weekly - 1 sets - 20 reps  - Seated Long Arc Quad  - 1 x daily - 7 x weekly - 1 sets - 20 reps    Assessment:  PT Assessment  Assessment Comment: PT ANTONIO EX'S WELL.  HE FATIGUES QUICKLY AND HAS A TENDENCY TO LUNGE FWD TO GRAB THINGS FOR BALANCE AND NOT MOVE HIS FEET.  PT CLIPS HIS L LE WITH HIS R WHEN AMB.  HE NEEDS TO CONCENTRATE ON AMB TO DO IT PROPERLY AND SAFELY.    Plan:  OP PT Plan  PT Frequency: 2 times per week  Duration: 10 weeks  Onset Date: 10/02/23  Certification Period Start Date: 11/07/23  Certification Period End Date: 02/05/24  Number of Treatments Authorized: 4 of 30    Goals:  Active       PT Problem       PT Goal 1       Start:  11/07/23    Expected End:  12/22/23       STG(4 wks):  - Pt. will have at least 10% increase in flexibility of affected joints/structures being treated- BLE  - Pt will have improved postural awareness for daily activities and exercises as evidenced by self corrections for improper positions/movements  - Pt. will be able to perform safe and independent transfers/bed mobility with less effort  - Pt will be independent and compliant with HEP  - Pt will have return of 0--> 120 AROM of R knee           PT Goal 2       Start:  11/07/23    Expected End:  02/05/24        LTG( 8 wks)  - Progress HEP and OMID within pt tolerance to goal attainment, including return to ADL/IADL and functional activities w/ less pain  - Pt. will have improved balance as evidenced by increased semi tandem stance time of at least 10 sec for improved everyday balance and less fall potential  - Pt. will have safe, independent ambulation with least restrictive assistive device for community  distances and stairs/curbs with normalized gait pattern  - Pt. will have reduction of pain to no greater than 2- 3 of 10 with ex. and activities  - Pt. will have increased strength of at least 1/3 muscle grade in affected muscle groups- R knee support mm  - Pt. will have at least 15 point improvement in functional test score- LFES-as evidenced by self report of improved ability to perform every day activities w/ greater ease and less pain  - Pt will be supervised and a gait belt will be used if needed to assure safety in the clinic during treatment                  Giovanny Harley, PTA

## 2023-11-22 ENCOUNTER — TREATMENT (OUTPATIENT)
Dept: PHYSICAL THERAPY | Facility: CLINIC | Age: 73
End: 2023-11-22
Payer: MEDICARE

## 2023-11-22 DIAGNOSIS — M25.561 ACUTE POSTOPERATIVE PAIN OF RIGHT KNEE: ICD-10-CM

## 2023-11-22 DIAGNOSIS — G89.18 ACUTE POSTOPERATIVE PAIN OF RIGHT KNEE: ICD-10-CM

## 2023-11-22 PROCEDURE — 97110 THERAPEUTIC EXERCISES: CPT | Mod: GP

## 2023-11-22 ASSESSMENT — PAIN SCALES - GENERAL: PAINLEVEL_OUTOF10: 0 - NO PAIN

## 2023-11-22 NOTE — PROGRESS NOTES
Physical Therapy Treatment    Patient Name: Edilson Cantu  MRN: [id number]  Today's Date: 11/22/2023     Current Problem  1. Acute postoperative pain of right knee  Follow Up In Physical Therapy          Insurance:  Payor: [employer] / Plan: [employer] / Product Type: *No Product type* /   Number of Treatments Authorized: 5/30  Certification Period Start Date: 11/07/23  Certification Period End Date: 02/05/24    Subjective   General    The knee is feeling good.  Pleased with how the knee is going. Felt good after last session and HEP continues to go well. Says he falls x 1-2 a week at home.  He will feel like something will switch off his equilibrium and he will fall, even if he is holding onto something.     Performing HEP?: Yes    Precautions  Precautions  Precautions Comment: high fall risk, post op R TKR  Pain  Pain Score: 0 - No pain (no R knee pain.  4/10 back pain)    Objective     Treatments:    There ex:  Scifit man L1 x 6 min  At//bars:  Alt taps to 8 inch step, Butt kicks x 1 min each  Right lateral step up 4 inch step 1 min x 2 - SBA/CGA  Right fwd step up 6 inch step 1 min x 2 - SBA/CGA  R/L lateral steps yellow loop 1 min x 2   Sit to stand from chair with 1 arx pad 1 min x 2  Seated  with heel on step, Right knee ext with OP x 10   Right LAQ 4#s 10 x 2   Manual: Right knee flexion with OP, ext mobs grade 2-3   OP EDUCATION:    Verbally added knee ext with slef OP in sitting x 10 reps x 2-3 a day to HEP     Assessment:   Pt is progressing well, denies knee pain with walking/ADLs and only min ROM loss noted while performing manual. He fatigued toward the end of the session during sit to stand but was able to complete with UE assist PRN.     Plan:  OP PT Plan  Onset Date: 10/02/23  Certification Period Start Date: 11/07/23  Certification Period End Date: 02/05/24  Number of Treatments Authorized: 5/30    Goals:  Active       PT Problem       PT Goal 1       Start:  11/07/23    Expected  End:  12/22/23       STG(4 wks):  - Pt. will have at least 10% increase in flexibility of affected joints/structures being treated- BLE  - Pt will have improved postural awareness for daily activities and exercises as evidenced by self corrections for improper positions/movements  - Pt. will be able to perform safe and independent transfers/bed mobility with less effort  - Pt will be independent and compliant with HEP  - Pt will have return of 0--> 120 AROM of R knee           PT Goal 2       Start:  11/07/23    Expected End:  02/05/24        LTG( 8 wks)  - Progress HEP and OMID within pt tolerance to goal attainment, including return to ADL/IADL and functional activities w/ less pain  - Pt. will have improved balance as evidenced by increased semi tandem stance time of at least 10 sec for improved everyday balance and less fall potential  - Pt. will have safe, independent ambulation with least restrictive assistive device for community distances and stairs/curbs with normalized gait pattern  - Pt. will have reduction of pain to no greater than 2- 3 of 10 with ex. and activities  - Pt. will have increased strength of at least 1/3 muscle grade in affected muscle groups- R knee support mm  - Pt. will have at least 15 point improvement in functional test score- LFES-as evidenced by self report of improved ability to perform every day activities w/ greater ease and less pain  - Pt will be supervised and a gait belt will be used if needed to assure safety in the clinic during treatment                  Papa Mendez, PT

## 2023-11-25 DIAGNOSIS — I10 ESSENTIAL (PRIMARY) HYPERTENSION: ICD-10-CM

## 2023-11-27 RX ORDER — AMLODIPINE BESYLATE 5 MG/1
TABLET ORAL
Qty: 90 TABLET | Refills: 1 | Status: SHIPPED | OUTPATIENT
Start: 2023-11-27 | End: 2024-06-10

## 2023-11-28 ENCOUNTER — TREATMENT (OUTPATIENT)
Dept: PHYSICAL THERAPY | Facility: CLINIC | Age: 73
End: 2023-11-28
Payer: MEDICARE

## 2023-11-28 DIAGNOSIS — M25.561 ACUTE POSTOPERATIVE PAIN OF RIGHT KNEE: ICD-10-CM

## 2023-11-28 DIAGNOSIS — G89.18 ACUTE POSTOPERATIVE PAIN OF RIGHT KNEE: ICD-10-CM

## 2023-11-28 PROCEDURE — 97110 THERAPEUTIC EXERCISES: CPT | Mod: GP | Performed by: PHYSICAL MEDICINE & REHABILITATION

## 2023-11-28 PROCEDURE — 97530 THERAPEUTIC ACTIVITIES: CPT | Mod: GP | Performed by: PHYSICAL MEDICINE & REHABILITATION

## 2023-11-28 ASSESSMENT — PAIN SCALES - GENERAL: PAINLEVEL_OUTOF10: 0 - NO PAIN

## 2023-11-28 NOTE — PROGRESS NOTES
"  Physical Therapy Treatment    Patient Name: Edilson Cantu  MRN: [id number]  Today's Date: 11/28/2023  Time Calculation  Start Time: 1201  Stop Time: 1247  Time Calculation (min): 46 min  Current Problem  1. Acute postoperative pain of right knee  Follow Up In Physical Therapy          Insurance:  Number of Treatments Authorized: 6/30  Certification Period Start Date: 11/07/23  Certification Period End Date: 02/05/24    Subjective   General  Reason for Referral: Pt referred to PT for ongoing post op R TKR Rx  Referred By: Dr Gareth Reynolds  Past Medical History Relevant to Rehab: PMH reviewed by SM  General Comment: Patient reports having a feeling of general unsteadiness this visit. No new complaints otherwise.    Performing HEP?: Yes    Precautions  Precautions  Precautions Comment: high fall risk, post op R TKR  Pain  Pain Score: 0 - No pain    Objective   KNEE  Knee AROM  R knee flexion: (140°): 123  L knee flexion: (140°): 126  R knee extension: (0°): Lacking 3  L knee extension: (0°): Lacking 2    Treatments:    Therapeutic Exercise  Therapeutic Exercise Activity 1: SciFit: MAN L3 X 6MIN  Therapeutic Exercise Activity 2: Slantboard Stretch: 2x1'  Therapeutic Exercise Activity 3: Seated LAQ: 3x10 w 5# ankle weights  Therapeutic Exercise Activity 4: Seated Hip Flexion: 3x10 each leg w 5#  Therapeutic Exercise Activity 5: Supine Heel Slides: x20 w strap    Balance/Neuromuscular Re-Education  Balance/Neuromuscular Re-Education Activity 1: Standing Marches at table: x20 each leg  Balance/Neuromuscular Re-Education Activity 2: Side stepping along edge of table: x5 down and back on airex    Manual Therapy  Manual Therapy Activity 1: Tibiofemoral mobilizations for knee flexion: Grade II/III    Therapeutic Activity  Therapeutic Activity 1: Sit to stands from elevated table: 2x10 from 20 in  Therapeutic Activity 2: Forward step ups: 2x10 each leg onto 6\" box (CGA with gait belt)    Assessment:   Today's visit focused on " progressing activities to improve patient's lower extremity strength, balance and functional ability. Patient demonstrated good effort toward today's progressions. No increased pain reported at the conclusion of the session. Overall response toward today's interventions was great.      Plan:  Continue with current POC. Progress strength and functional ability as tolerated.   OP PT Plan  Onset Date: 10/02/23  Certification Period Start Date: 11/07/23  Certification Period End Date: 02/05/24  Number of Treatments Authorized: 6/30    Goals:  Active       PT Problem       PT Goal 1       Start:  11/07/23    Expected End:  12/22/23       STG(4 wks):  - Pt. will have at least 10% increase in flexibility of affected joints/structures being treated- BLE  - Pt will have improved postural awareness for daily activities and exercises as evidenced by self corrections for improper positions/movements  - Pt. will be able to perform safe and independent transfers/bed mobility with less effort  - Pt will be independent and compliant with HEP  - Pt will have return of 0--> 120 AROM of R knee           PT Goal 2       Start:  11/07/23    Expected End:  02/05/24        LTG( 8 wks)  - Progress HEP and OMID within pt tolerance to goal attainment, including return to ADL/IADL and functional activities w/ less pain  - Pt. will have improved balance as evidenced by increased semi tandem stance time of at least 10 sec for improved everyday balance and less fall potential  - Pt. will have safe, independent ambulation with least restrictive assistive device for community distances and stairs/curbs with normalized gait pattern  - Pt. will have reduction of pain to no greater than 2- 3 of 10 with ex. and activities  - Pt. will have increased strength of at least 1/3 muscle grade in affected muscle groups- R knee support mm  - Pt. will have at least 15 point improvement in functional test score- LFES-as evidenced by self report of improved  ability to perform every day activities w/ greater ease and less pain  - Pt will be supervised and a gait belt will be used if needed to assure safety in the clinic during treatment                  Zachery Foster, PT

## 2023-11-30 ENCOUNTER — TREATMENT (OUTPATIENT)
Dept: PHYSICAL THERAPY | Facility: CLINIC | Age: 73
End: 2023-11-30
Payer: MEDICARE

## 2023-11-30 DIAGNOSIS — M25.561 ACUTE POSTOPERATIVE PAIN OF RIGHT KNEE: Primary | ICD-10-CM

## 2023-11-30 DIAGNOSIS — G89.18 ACUTE POSTOPERATIVE PAIN OF RIGHT KNEE: Primary | ICD-10-CM

## 2023-11-30 DIAGNOSIS — R26.2 DIFFICULTY WALKING: ICD-10-CM

## 2023-11-30 PROCEDURE — 97110 THERAPEUTIC EXERCISES: CPT | Mod: GP

## 2023-11-30 PROCEDURE — 97112 NEUROMUSCULAR REEDUCATION: CPT | Mod: GP

## 2023-11-30 PROCEDURE — 97140 MANUAL THERAPY 1/> REGIONS: CPT | Mod: GP

## 2023-11-30 ASSESSMENT — PAIN SCALES - GENERAL: PAINLEVEL_OUTOF10: 3

## 2023-11-30 ASSESSMENT — PAIN - FUNCTIONAL ASSESSMENT: PAIN_FUNCTIONAL_ASSESSMENT: 0-10

## 2023-11-30 NOTE — PROGRESS NOTES
"  Physical Therapy Treatment    Patient Name: Edilson Cantu  MRN: [id number]  Today's Date: 11/30/2023  Time Calculation  Start Time: 0905  Stop Time: 1000  Time Calculation (min): 55 min  Current Problem  1. Acute postoperative pain of right knee  Follow Up In Physical Therapy      2. Difficulty walking            Insurance:  Number of Treatments Authorized: 7/30  Certification Period Start Date: 11/07/23  Certification Period End Date: 02/05/24    Subjective   General  General Comment: \"I am really stiff thi morning\"- mostly back and shoulders, knee is doing great; still fall at least daily- usually if try to turn too quickly-ie in the kitchen. \"I really want to be able to walk with a cane...\"    Performing HEP?: Yes    Precautions  Precautions  Precautions Comment: high fall risk, post op R TKR  Pain  Pain Assessment: 0-10  Pain Score: 3 (back/shoulders)    Objective   Pt walking w/ head down, narrow MARIAMA- sways R/L w/ rollator, tends to shuffle LE  Able to stand x 30 sec w/o UE support at // bar safely  Single UE supported tasks w/ min need for external PT assist- postural due to tendency to allow trunk to collapse/sway   Able to attain 0 ext R knee w/ mild pain reported     Treatments:  Therapeutic Exercise  Supine R/L TR x 20  HL clam shells x 1'  SKTC R/L x 5- 5 sec holds  Supine PVC assisted chest press x 1', OH FF x 1', R/L abd x 1'  Therapeutic Exercise Activity 1: SciFit: MAN L3 X 5 MIN- LE in support pedals  Therapeutic Exercise Activity 2: Slantboard Stretch x 1'  Therapeutic Exercise Activity 3: Seated LAQ ALT 5#/5# x 90 sec  Therapeutic Exercise Activity 4: Seated Hip Flexion ALT 5#/5# x 90 sec  Therapeutic Exercise Activity 5: seated HS curls R w/ GTB R/L 2 x 12 ea     Neuromuscular Re Ed:   Standing Marches- tap stool w/ 2--> 1 UE support 2 x 1', CGA   DLB 2 x 30 sec-reaches R/L touch // bar 2 x 10 ea- SBA/CGA   Stand w/ 1 UE on // bar: R/L foot taps into flex/abd x 10 ea- postural assist as " needed  Side step along // bar w/ light UE x 2'  Gait practice entering/exiting Rx area w/ rollator- VC for head/trunk position and to lengthen strides     Manual Therapy  MFR B leg pull  Stretch B HS, adds, GS, piri/gluts  PROM B hips, R knee w/ gentle ext OP  STM R knee globally, catarina patellar  Mobs- SATs, PF R                                 OP EDUCATION:     Practice standing w/ stable MARIAMA w/o UE- at counter- as safety allows  Assessment:  PT Assessment  Assessment Comment: Pt was very stiff and tight today; did some stretching on plinth for truk and UE and felt some relief; R knee flex ROM is WFL, some tightness into ext- 0 attained w/ mild pain; worked w/ pt on mm re ed for LE as well as progressions to single UE supported balance activities to attempt progression to a cane if safety allow- pt goal    Plan:  OP PT Plan  Onset Date: 10/02/23  Certification Period Start Date: 11/07/23  Certification Period End Date: 02/05/24  Number of Treatments Authorized: 7/30  Cont Rx for ROM/flexibility, sx mgt, effusion and scar tissue mgt, gait/balance and transfer training, mm re-ed, core stabilization, HEP w/ progression, posture, body mechanics and safety training so that pt can perform daily functional activities w/ less pain, more safely and w/ greater ease   - progress balance challenges as pt able to javy   Goals:  Active       PT Problem       PT Goal 1       Start:  11/07/23    Expected End:  12/22/23       STG(4 wks):  - Pt. will have at least 10% increase in flexibility of affected joints/structures being treated- BLE  - Pt will have improved postural awareness for daily activities and exercises as evidenced by self corrections for improper positions/movements  - Pt. will be able to perform safe and independent transfers/bed mobility with less effort  - Pt will be independent and compliant with HEP  - Pt will have return of 0--> 120 AROM of R knee           PT Goal 2       Start:  11/07/23    Expected End:   02/05/24        LTG( 8 wks)  - Progress HEP and OMID within pt tolerance to goal attainment, including return to ADL/IADL and functional activities w/ less pain  - Pt. will have improved balance as evidenced by increased semi tandem stance time of at least 10 sec for improved everyday balance and less fall potential  - Pt. will have safe, independent ambulation with least restrictive assistive device for community distances and stairs/curbs with normalized gait pattern  - Pt. will have reduction of pain to no greater than 2- 3 of 10 with ex. and activities  - Pt. will have increased strength of at least 1/3 muscle grade in affected muscle groups- R knee support mm  - Pt. will have at least 15 point improvement in functional test score- LFES-as evidenced by self report of improved ability to perform every day activities w/ greater ease and less pain  - Pt will be supervised and a gait belt will be used if needed to assure safety in the clinic during treatment                  Talya Britton, PT

## 2023-12-05 ENCOUNTER — TREATMENT (OUTPATIENT)
Dept: PHYSICAL THERAPY | Facility: CLINIC | Age: 73
End: 2023-12-05
Payer: MEDICARE

## 2023-12-05 DIAGNOSIS — G89.18 ACUTE POSTOPERATIVE PAIN OF RIGHT KNEE: ICD-10-CM

## 2023-12-05 DIAGNOSIS — R26.2 DIFFICULTY WALKING: Primary | ICD-10-CM

## 2023-12-05 DIAGNOSIS — M25.561 ACUTE POSTOPERATIVE PAIN OF RIGHT KNEE: ICD-10-CM

## 2023-12-05 PROCEDURE — 97112 NEUROMUSCULAR REEDUCATION: CPT | Mod: GP | Performed by: PHYSICAL MEDICINE & REHABILITATION

## 2023-12-05 PROCEDURE — 97110 THERAPEUTIC EXERCISES: CPT | Mod: GP | Performed by: PHYSICAL MEDICINE & REHABILITATION

## 2023-12-05 ASSESSMENT — PAIN SCALES - GENERAL: PAINLEVEL_OUTOF10: 0 - NO PAIN

## 2023-12-05 NOTE — PROGRESS NOTES
Physical Therapy Treatment    Patient Name: Edilson Cantu  MRN: [id number]  Today's Date: 12/5/2023  Time Calculation  Start Time: 1255  Stop Time: 1335  Time Calculation (min): 40 min  Current Problem  1. Difficulty walking        2. Acute postoperative pain of right knee  Follow Up In Physical Therapy          Insurance:  Number of Treatments Authorized: 7/30  Certification Period Start Date: 11/07/23  Certification Period End Date: 02/05/24    Subjective   General  Reason for Referral: Pt referred to PT for ongoing post op R TKR Rx  Referred By: Dr Gareth Reynolds  General Comment: Patient reports no new complaints this visit. No increased pain or adverse responses following his previous session.    Precautions  Precautions  Precautions Comment: high fall risk, post op R TKR  Pain  Pain Score: 0 - No pain    Objective   KNEE    Knee AROM   R knee flexion: (140°): 126  L knee flexion: (140°): 126    Treatments:    Therapeutic Exercise  Therapeutic Exercise Activity 1: SciFit: MAN L3 X 6MIN  Therapeutic Exercise Activity 2: Seated LAQ: 3x10 w 6# ankle weights  Therapeutic Exercise Activity 3: Seated Hip Flexion: 3x10 each leg w 6#    Balance/Neuromuscular Re-Education  Balance/Neuromuscular Re-Education Activity 1: Standing Marches at table: x20 each leg  Balance/Neuromuscular Re-Education Activity 2: Side stepping along edge of table: x5 down and back on airex  Balance/Neuromuscular Re-Education Activity 3: Biodex: LOS; Easy x2 rounds; Best score: 36%    Manual Therapy  Manual Therapy Activity 1: Tibiofemoral mobilizations for knee flexion: Grade II/III    Therapeutic Activity  Therapeutic Activity 1: Sit to stands from elevated table: 2x10 from 20 in    Assessment:   Today's visit focused on progressing activities to improve patient's bilateral lower extremity strength, AROM, balance and functional ability. Patient demonstrated good effort toward today's progressions. Improved right knee flexion AROM was measured  during today's visit. No increased pain reported at the conclusion of the session. Overall response toward today's interventions was great.      Plan:  Continue with current POC. Progress strength and balance as tolerated.     OP PT Plan  Onset Date: 10/02/23  Certification Period Start Date: 11/07/23  Certification Period End Date: 02/05/24  Number of Treatments Authorized: 7/30    Goals:  Active       PT Problem       PT Goal 1       Start:  11/07/23    Expected End:  12/22/23       STG(4 wks):  - Pt. will have at least 10% increase in flexibility of affected joints/structures being treated- BLE  - Pt will have improved postural awareness for daily activities and exercises as evidenced by self corrections for improper positions/movements  - Pt. will be able to perform safe and independent transfers/bed mobility with less effort  - Pt will be independent and compliant with HEP  - Pt will have return of 0--> 120 AROM of R knee           PT Goal 2       Start:  11/07/23    Expected End:  02/05/24        LTG( 8 wks)  - Progress HEP and OMID within pt tolerance to goal attainment, including return to ADL/IADL and functional activities w/ less pain  - Pt. will have improved balance as evidenced by increased semi tandem stance time of at least 10 sec for improved everyday balance and less fall potential  - Pt. will have safe, independent ambulation with least restrictive assistive device for community distances and stairs/curbs with normalized gait pattern  - Pt. will have reduction of pain to no greater than 2- 3 of 10 with ex. and activities  - Pt. will have increased strength of at least 1/3 muscle grade in affected muscle groups- R knee support mm  - Pt. will have at least 15 point improvement in functional test score- LFES-as evidenced by self report of improved ability to perform every day activities w/ greater ease and less pain  - Pt will be supervised and a gait belt will be used if needed to assure safety in the  clinic during treatment                  Zachery Foster, PT

## 2023-12-07 ENCOUNTER — TREATMENT (OUTPATIENT)
Dept: PHYSICAL THERAPY | Facility: CLINIC | Age: 73
End: 2023-12-07
Payer: MEDICARE

## 2023-12-07 DIAGNOSIS — M25.561 ACUTE POSTOPERATIVE PAIN OF RIGHT KNEE: ICD-10-CM

## 2023-12-07 DIAGNOSIS — G89.18 ACUTE POSTOPERATIVE PAIN OF RIGHT KNEE: ICD-10-CM

## 2023-12-07 PROCEDURE — 97110 THERAPEUTIC EXERCISES: CPT | Mod: GP | Performed by: PHYSICAL MEDICINE & REHABILITATION

## 2023-12-07 PROCEDURE — 97112 NEUROMUSCULAR REEDUCATION: CPT | Mod: GP | Performed by: PHYSICAL MEDICINE & REHABILITATION

## 2023-12-07 PROCEDURE — 97530 THERAPEUTIC ACTIVITIES: CPT | Mod: GP | Performed by: PHYSICAL MEDICINE & REHABILITATION

## 2023-12-07 ASSESSMENT — PAIN SCALES - GENERAL: PAINLEVEL_OUTOF10: 0 - NO PAIN

## 2023-12-07 NOTE — PROGRESS NOTES
"  Physical Therapy Treatment    Patient Name: Edilson Cantu  MRN: [id number]  Today's Date: 12/7/2023  Time Calculation  Start Time: 1242  Stop Time: 1325  Time Calculation (min): 43 min  Current Problem  1. Acute postoperative pain of right knee  Follow Up In Physical Therapy          Insurance:  Number of Treatments Authorized: 9/30  Certification Period Start Date: 11/07/23  Certification Period End Date: 02/05/24    Subjective   General  Reason for Referral: Pt referred to PT for ongoing post op R TKR Rx  Referred By: Dr Gareth Reynolds  General Comment: Patient reports feeling some soreness and stiffness in his legs this visit. He attributes this to doing a lot of walking yesterday. No new complaints otherwise.    Precautions  Precautions  Precautions Comment: high fall risk, post op R TKR  Pain  Pain Score: 0 - No pain    Objective   Treatments:    Therapeutic Exercise  Therapeutic Exercise Activity 1: SciFit: MAN L3 X 6MIN  Therapeutic Exercise Activity 2: Seated LAQ: 3x10 w 6# ankle weights  Therapeutic Exercise Activity 3: Seated Hip Flexion: 3x10 each leg w 6#    Balance/Neuromuscular Re-Education  Balance/Neuromuscular Re-Education Activity 1: Standing Marches at table: x20 each leg  Balance/Neuromuscular Re-Education Activity 2: Side stepping along edge of table: x5 down and back on airex  Balance/Neuromuscular Re-Education Activity 3: Tandem Walking on Airex: x3 laps down and back  Balance/Neuromuscular Re-Education Activity 4: Biodex: LOS; L12; Easy x4 rounds; Best score: 47%    Therapeutic Activity  Therapeutic Activity 1: Sit to stands with arms crossed: 3x10 from 23\" seat  Therapeutic Activity 2: Forward Step Up: x10 each leg on 6\" box    Assessment:   Today's visit focused on progressing activities to improve patient's bilateral lower extremity strength, balance and functional ability. Patient demonstrated good effort toward today's progressions. No increased pain reported at the conclusion of the " session. Overall response toward today's interventions was great.      Plan:  Continue with current POC. Progress strength and functional ability as tolerated.   OP PT Plan  Onset Date: 10/02/23  Certification Period Start Date: 11/07/23  Certification Period End Date: 02/05/24  Number of Treatments Authorized: 9/30    Goals:  Active       PT Problem       PT Goal 1       Start:  11/07/23    Expected End:  12/22/23       STG(4 wks):  - Pt. will have at least 10% increase in flexibility of affected joints/structures being treated- BLE  - Pt will have improved postural awareness for daily activities and exercises as evidenced by self corrections for improper positions/movements  - Pt. will be able to perform safe and independent transfers/bed mobility with less effort  - Pt will be independent and compliant with HEP  - Pt will have return of 0--> 120 AROM of R knee           PT Goal 2       Start:  11/07/23    Expected End:  02/05/24        LTG( 8 wks)  - Progress HEP and OMID within pt tolerance to goal attainment, including return to ADL/IADL and functional activities w/ less pain  - Pt. will have improved balance as evidenced by increased semi tandem stance time of at least 10 sec for improved everyday balance and less fall potential  - Pt. will have safe, independent ambulation with least restrictive assistive device for community distances and stairs/curbs with normalized gait pattern  - Pt. will have reduction of pain to no greater than 2- 3 of 10 with ex. and activities  - Pt. will have increased strength of at least 1/3 muscle grade in affected muscle groups- R knee support mm  - Pt. will have at least 15 point improvement in functional test score- LFES-as evidenced by self report of improved ability to perform every day activities w/ greater ease and less pain  - Pt will be supervised and a gait belt will be used if needed to assure safety in the clinic during treatment                  Zachery Foster, PT

## 2023-12-12 ENCOUNTER — TREATMENT (OUTPATIENT)
Dept: PHYSICAL THERAPY | Facility: CLINIC | Age: 73
End: 2023-12-12
Payer: MEDICARE

## 2023-12-12 DIAGNOSIS — G89.18 ACUTE POSTOPERATIVE PAIN OF RIGHT KNEE: ICD-10-CM

## 2023-12-12 DIAGNOSIS — M25.561 ACUTE POSTOPERATIVE PAIN OF RIGHT KNEE: ICD-10-CM

## 2023-12-12 PROCEDURE — 97112 NEUROMUSCULAR REEDUCATION: CPT | Mod: GP | Performed by: PHYSICAL MEDICINE & REHABILITATION

## 2023-12-12 PROCEDURE — 97110 THERAPEUTIC EXERCISES: CPT | Mod: GP | Performed by: PHYSICAL MEDICINE & REHABILITATION

## 2023-12-12 PROCEDURE — 97530 THERAPEUTIC ACTIVITIES: CPT | Mod: GP | Performed by: PHYSICAL MEDICINE & REHABILITATION

## 2023-12-12 ASSESSMENT — PAIN SCALES - GENERAL: PAINLEVEL_OUTOF10: 0 - NO PAIN

## 2023-12-12 NOTE — PROGRESS NOTES
"  Physical Therapy Treatment    Patient Name: Edilson Cantu  MRN: [id number]  Today's Date: 12/12/2023  Time Calculation  Start Time: 1245  Stop Time: 1327  Time Calculation (min): 42 min  Current Problem  1. Acute postoperative pain of right knee  Follow Up In Physical Therapy          Insurance:  Number of Treatments Authorized: 10/30  Certification Period Start Date: 11/07/23  Certification Period End Date: 02/05/24    Subjective   General  Reason for Referral: Pt referred to PT for ongoing post op R TKR Rx  Referred By: Dr Gareth Reynolds  General Comment: Patient reports no new complaints this visit. No increased pain or adverse responses following his previous session.    Precautions  Precautions  Precautions Comment: high fall risk, post op R TKR  Pain  Pain Score: 0 - No pain    Objective   HIP  Specific Lower Extremity MMT   R Iliopsoas: (5/5): 4+/5  L Iliopsoas: (5/5): 4+/5  R Gluteals (sidelying): (5/5): 4-/5  L Gluteals (sidelying): (5/5): 4/5  R knee flexion: (5/5): 4+/5  L knee flexion: (5/5): 4/5  R knee extension: (5/5): 4+/5  L knee extension: (5/5): 4+/5    Knee AROM   R knee flexion: (140°): 126  L knee flexion: (140°): 126    Outcome Measures:  Other Measures  Lower Extremity Funtional Score (LEFS): 23/80    Treatments:    Therapeutic Exercise  Therapeutic Exercise Activity 1: SciFit: MAN L3 X 5MIN  Therapeutic Exercise Activity 2: Seated LAQ: 3x10 w 6# ankle weights  Therapeutic Exercise Activity 3: Seated Hip Flexion: 3x10 each leg w 6#    Balance/Neuromuscular Re-Education  Balance/Neuromuscular Re-Education Activity 1: Biodex: LOS; L12; Easy x4 rounds; Best score: 69%  Balance/Neuromuscular Re-Education Activity 2: Tandem Stance: 2x30\" holds each side    Therapeutic Activity  Therapeutic Activity 1: Sit to stands with arms crossed: 3x10 from 21\" seat  Therapeutic Activity 2: Side Stepping Along edge of table: x5 laps down and back w RTB around ankles    Assessment:   Patient has attended 10 visits " of skilled physical therapy intervention. He displays good progress since initiating his plan of care. Progress is observed objectively with improved right knee AROM and bilateral hip strength measurements. Progress is noted subjectively with patient reports. Patient continues to display limited strength in bilateral lower extremities, which impairs his ability to stand and walk for longer periods of time. Therefore, patient will continue to benefit from skilled physical therapy intervention in order to address these deficits and improve overall quality of life.     Plan:  OP PT Plan  Treatment/Interventions: Education/ Instruction, Electrical stimulation, Gait training, Manual therapy, Neuromuscular re-education, Therapeutic activities, Therapeutic exercises, Vasopneumatic device  PT Plan: Skilled PT  PT Frequency: 2 times per week  Duration: 5 weeks  Onset Date: 10/02/23  Certification Period Start Date: 11/07/23  Certification Period End Date: 02/05/24  Number of Treatments Authorized: 10/30  Rehab Potential: Good  Plan of Care Agreement: Patient    Goals:  Active       PT Problem       PT Goal 1       Start:  11/07/23    Expected End:  12/22/23       STG(4 wks):  - Pt. will have at least 10% increase in flexibility of affected joints/structures being treated- BLE  - Pt will have improved postural awareness for daily activities and exercises as evidenced by self corrections for improper positions/movements  - Pt. will be able to perform safe and independent transfers/bed mobility with less effort  - Pt will be independent and compliant with HEP  - Pt will have return of 0--> 120 AROM of R knee           PT Goal 2       Start:  11/07/23    Expected End:  02/05/24        LTG( 8 wks)  - Progress HEP and OMID within pt tolerance to goal attainment, including return to ADL/IADL and functional activities w/ less pain  - Pt. will have improved balance as evidenced by increased semi tandem stance time of at least 10 sec  for improved everyday balance and less fall potential  - Pt. will have safe, independent ambulation with least restrictive assistive device for community distances and stairs/curbs with normalized gait pattern  - Pt. will have reduction of pain to no greater than 2- 3 of 10 with ex. and activities  - Pt. will have increased strength of at least 1/3 muscle grade in affected muscle groups- R knee support mm  - Pt. will have at least 15 point improvement in functional test score- LFES-as evidenced by self report of improved ability to perform every day activities w/ greater ease and less pain  - Pt will be supervised and a gait belt will be used if needed to assure safety in the clinic during treatment                  Zachery Foster, PT

## 2023-12-14 ENCOUNTER — TREATMENT (OUTPATIENT)
Dept: PHYSICAL THERAPY | Facility: CLINIC | Age: 73
End: 2023-12-14
Payer: MEDICARE

## 2023-12-14 DIAGNOSIS — R26.2 DIFFICULTY WALKING: Primary | ICD-10-CM

## 2023-12-14 DIAGNOSIS — M25.561 ACUTE POSTOPERATIVE PAIN OF RIGHT KNEE: ICD-10-CM

## 2023-12-14 DIAGNOSIS — G89.18 ACUTE POSTOPERATIVE PAIN OF RIGHT KNEE: ICD-10-CM

## 2023-12-14 PROCEDURE — 97112 NEUROMUSCULAR REEDUCATION: CPT | Mod: GP,CQ

## 2023-12-14 PROCEDURE — 97110 THERAPEUTIC EXERCISES: CPT | Mod: GP,CQ

## 2023-12-14 ASSESSMENT — PAIN SCALES - GENERAL: PAINLEVEL_OUTOF10: 2

## 2023-12-14 NOTE — PROGRESS NOTES
Physical Therapy Treatment    Patient Name: Edilson Cantu  MRN: [id number]  Today's Date: 12/14/2023  Time Calculation  Start Time: 1336  Stop Time: 1416  Time Calculation (min): 40 min  Current Problem  1. Difficulty walking        2. Acute postoperative pain of right knee  Follow Up In Physical Therapy          Insurance:  Payor: [employer] / Plan: [employer] / Product Type: *No Product type* /   Number of Treatments Authorized: 11/30  Certification Period Start Date: 11/07/23  Certification Period End Date: 02/05/24    Subjective   General  Reason for Referral: Pt referred to PT for ongoing post op R TKR Rx  Referred By: Dr Gareth Reynolds  General Comment: PT STATES HE'S BEEN ON HIS FEET MOST OF THE DAY AND HIS LEGS ARE HURTING.    Performing HEP?: Yes    Precautions  Precautions  Precautions Comment: high fall risk, post op R TKR  Pain  Pain Score: 2    Objective   General Observation  General Observation: PT AMB WITH ROLLATOR       Treatments:    Therapeutic Exercise  Therapeutic Exercise Activity 1: [place]S L2 X 6 MIN FOOT STRAPS USED  Therapeutic Exercise Activity 2: Slantboard Stretch: 2x1'  Therapeutic Exercise Activity 3: HEEL RAISES X 1 MIN  Therapeutic Exercise Activity 4: LEG PRESS 50# X 1 MIN, 50# X 1 MIN  Therapeutic Exercise Activity 5: FOOTWORM YELLOW LOOP ALONG // BAR X 2 MIN  Therapeutic Exercise Activity 6: HAM CURLS 50# X 2 MIN  Therapeutic Exercise Activity 7: LAQ R/L ALT 4# X 2 MIN    Balance/Neuromuscular Re-Education  Balance/Neuromuscular Re-Education Activity 1: AIREX BEAM BALANCING // BAR X 3 MIN  Balance/Neuromuscular Re-Education Activity 2: TILT BOARD F/B BALANCING X 2 MIN  Balance/Neuromuscular Re-Education Activity 3: TILT BOARD S/S TAPS // BAR X 2 MIN                        OP EDUCATION:  Outpatient Education  Education Comment: CONTINUJE WITH CURRENT HEP    Assessment:  PT Assessment  Assessment Comment: PT ANTONIO EX'S WELL.  HE NEEDS VC TO SLOW DOWN WHEN PERFORMING  THER EX.  PT NEEDED A COUPLE OF BREAKS TODAY. HE ALSO HAS A TENDENCY TO HAVE A NARROW BASE WHEN TRYING TO STAND UP FROM A CHAIR. OVERALL HE IS SLOWLY PROGRESS ING TOWARDAS GOALS.    Plan:  OP PT Plan  Treatment/Interventions: Education/ Instruction, Electrical stimulation, Gait training, Manual therapy, Neuromuscular re-education, Therapeutic activities, Therapeutic exercises, Vasopneumatic device  PT Plan: Skilled PT  PT Frequency: 2 times per week  Duration: 5 weeks  Onset Date: 10/02/23  Certification Period Start Date: 11/07/23  Certification Period End Date: 02/05/24  Number of Treatments Authorized: 11/30    Goals:  Active       PT Problem       PT Goal 1       Start:  11/07/23    Expected End:  12/22/23       STG(4 wks):  - Pt. will have at least 10% increase in flexibility of affected joints/structures being treated- BLE  - Pt will have improved postural awareness for daily activities and exercises as evidenced by self corrections for improper positions/movements  - Pt. will be able to perform safe and independent transfers/bed mobility with less effort  - Pt will be independent and compliant with HEP  - Pt will have return of 0--> 120 AROM of R knee           PT Goal 2       Start:  11/07/23    Expected End:  02/05/24        LTG( 8 wks)  - Progress HEP and OMID within pt tolerance to goal attainment, including return to ADL/IADL and functional activities w/ less pain  - Pt. will have improved balance as evidenced by increased semi tandem stance time of at least 10 sec for improved everyday balance and less fall potential  - Pt. will have safe, independent ambulation with least restrictive assistive device for community distances and stairs/curbs with normalized gait pattern  - Pt. will have reduction of pain to no greater than 2- 3 of 10 with ex. and activities  - Pt. will have increased strength of at least 1/3 muscle grade in affected muscle groups- R knee support mm  - Pt. will have at least 15 point  improvement in functional test score- LFES-as evidenced by self report of improved ability to perform every day activities w/ greater ease and less pain  - Pt will be supervised and a gait belt will be used if needed to assure safety in the clinic during treatment                  Giovanny Harley PTA

## 2023-12-19 ENCOUNTER — TREATMENT (OUTPATIENT)
Dept: PHYSICAL THERAPY | Facility: CLINIC | Age: 73
End: 2023-12-19
Payer: MEDICARE

## 2023-12-19 DIAGNOSIS — G89.18 ACUTE POSTOPERATIVE PAIN OF RIGHT KNEE: Primary | ICD-10-CM

## 2023-12-19 DIAGNOSIS — R26.2 DIFFICULTY WALKING: ICD-10-CM

## 2023-12-19 DIAGNOSIS — M25.561 ACUTE POSTOPERATIVE PAIN OF RIGHT KNEE: Primary | ICD-10-CM

## 2023-12-19 PROCEDURE — 97112 NEUROMUSCULAR REEDUCATION: CPT | Mod: GP

## 2023-12-19 PROCEDURE — 97110 THERAPEUTIC EXERCISES: CPT | Mod: GP

## 2023-12-19 ASSESSMENT — PAIN SCALES - GENERAL: PAINLEVEL_OUTOF10: 3

## 2023-12-19 ASSESSMENT — PAIN - FUNCTIONAL ASSESSMENT: PAIN_FUNCTIONAL_ASSESSMENT: 0-10

## 2023-12-19 NOTE — PROGRESS NOTES
Physical Therapy Treatment    Patient Name: Edilson Cantu  MRN: [id number]  Today's Date: 12/19/2023  Time Calculation  Start Time: 0134  Stop Time: 0216  Time Calculation (min): 42 min  Current Problem  1. Acute postoperative pain of right knee  Follow Up In Physical Therapy      2. Difficulty walking            Insurance:  Number of Treatments Authorized: 12/30  Certification Period Start Date: 11/07/23  Certification Period End Date: 02/05/24    Subjective   General  General Comment: Pt reports he feels achy all over today, took some Advil      Performing HEP?: Yes    Precautions  Precautions  Precautions Comment: high fall risk, post op R TKR  Pain  Pain Assessment: 0-10  Pain Score: 3    Objective   Pt able to stand on Airex w/o UE support up to 30 sec- closely supervised  LLE weakness evident w/ weight shifting efforts on Airex, compared to RLE     Treatments:  Therapeutic Exercise  Therapeutic Exercise Activity 1: SC[place]S L2 X 6 MIN FOOT STRAPS USED  Therapeutic Exercise Activity 2: Slantboard Stretch: x 1'  Therapeutic Exercise Activity 3: HEEL RAISES X 1 MIN  Therapeutic Exercise Activity 4: sit to stand w/ hands on knees- 2 Airex, some tactile assist for trunk position x 10  Therapeutic Exercise Activity 5: Yellow loop FOOTWORM  ALONG // BAR X 1', March and tap stool x 1'  Therapeutic Exercise Activity 6: HAM CURLS 50# X 2 MIN  Therapeutic Exercise Activity 7: LAQ R/L ALT 5# X 2 MIN    Balance/Neuromuscular Re-Education  Balance/Neuromuscular Re-Education Activity 1: AIREX: DLB x 1', R/L weight shift x 1', reaches R/L touch bar x 10 ea; EC x 1'  Balance/Neuromuscular Re-Education Activity 2: TILT BOARD F/B taps x 1'- balance x 1'  Balance/Neuromuscular Re-Education Activity 3: TILT BOARD S/S TAPS // BAR X 1'- balance x 1'  Gait practice w/ SPC and min assist- cues for step pattern, use of cane in R hand, 2 x 60 ft                       Assessment:  PT Assessment  Assessment Comment: Pt did well w/  TE, was challenged w/ Airex balance activities- tends to limit WB/weight shift over LLE; some tactile assist for proper trunk alignment; pt very anxious to progress to a cane- was able to use proper step pattern though balance too compromised to perform w/o assist at this point; no report of increased pain w/ Rx    Plan:  OP PT Plan  PT Plan: Skilled PT  Onset Date: 10/02/23  Certification Period Start Date: 11/07/23  Certification Period End Date: 02/05/24  Number of Treatments Authorized: 12/30  Cont mm re ed, stretches, gait and balance progressions to increase safet w/ mobility and work on transitioning to a cane if feasible         Talya Britton, PT

## 2023-12-21 ENCOUNTER — APPOINTMENT (OUTPATIENT)
Dept: PHYSICAL THERAPY | Facility: CLINIC | Age: 73
End: 2023-12-21
Payer: MEDICARE

## 2023-12-26 ENCOUNTER — APPOINTMENT (OUTPATIENT)
Dept: PHYSICAL THERAPY | Facility: CLINIC | Age: 73
End: 2023-12-26
Payer: MEDICARE

## 2023-12-28 ENCOUNTER — DOCUMENTATION (OUTPATIENT)
Dept: PHYSICAL THERAPY | Facility: CLINIC | Age: 73
End: 2023-12-28
Payer: MEDICARE

## 2023-12-28 ENCOUNTER — APPOINTMENT (OUTPATIENT)
Dept: PHYSICAL THERAPY | Facility: CLINIC | Age: 73
End: 2023-12-28
Payer: MEDICARE

## 2023-12-28 NOTE — PROGRESS NOTES
Physical Therapy                 Therapy Communication Note    Patient Name: Edilson Cantu  MRN: [id number]  Today's Date: 12/28/2023     Discipline: Physical Therapy    Missed Visit Reason:      Missed Time: Cancel

## 2023-12-28 NOTE — PROGRESS NOTES
Physical Therapy Treatment    Patient Name: Edilson Cantu  MRN: [id number]  Today's Date: 12/28/2023     Current Problem  No diagnosis found.    Insurance:             Subjective   General         Performing HEP?: {Yes/No:18682}    Precautions     Pain       Objective           Treatments:  Therapeutic Exercise  Therapeutic Exercise Activity 1: SC[place]S L2 X 6 MIN FOOT STRAPS USED  Therapeutic Exercise Activity 2: Slantboard Stretch: x 1'  Therapeutic Exercise Activity 3: HEEL RAISES X 1 MIN  Therapeutic Exercise Activity 4: sit to stand w/ hands on knees- 2 Airex, some tactile assist for trunk position x 10  Therapeutic Exercise Activity 5: Yellow loop FOOTWORM  ALONG // BAR X 1', March and tap stool x 1'  Therapeutic Exercise Activity 6: HAM CURLS 50# X 2 MIN  Therapeutic Exercise Activity 7: LAQ R/L ALT 5# X 2 MIN     Balance/Neuromuscular Re-Education  Balance/Neuromuscular Re-Education Activity 1: AIREX: DLB x 1', R/L weight shift x 1', reaches R/L touch bar x 10 ea; EC x 1'  Balance/Neuromuscular Re-Education Activity 2: TILT BOARD F/B taps x 1'- balance x 1'  Balance/Neuromuscular Re-Education Activity 3: TILT BOARD S/S TAPS // BAR X 1'- balance x 1'  Gait practice w/ SPC and min assist- cues for step pattern, use of cane in R hand, 2 x 60 ft                                OP EDUCATION:       Assessment:       Plan:       Goals:       Talya Britton PT

## 2024-01-03 ENCOUNTER — TREATMENT (OUTPATIENT)
Dept: PHYSICAL THERAPY | Facility: CLINIC | Age: 74
End: 2024-01-03
Payer: MEDICARE

## 2024-01-03 DIAGNOSIS — R26.2 DIFFICULTY WALKING: Primary | ICD-10-CM

## 2024-01-03 DIAGNOSIS — G89.18 ACUTE POSTOPERATIVE PAIN OF RIGHT KNEE: ICD-10-CM

## 2024-01-03 DIAGNOSIS — M25.561 ACUTE POSTOPERATIVE PAIN OF RIGHT KNEE: ICD-10-CM

## 2024-01-03 PROCEDURE — 97110 THERAPEUTIC EXERCISES: CPT | Mod: GP,CQ

## 2024-01-03 PROCEDURE — 97112 NEUROMUSCULAR REEDUCATION: CPT | Mod: GP,CQ

## 2024-01-03 ASSESSMENT — PAIN SCALES - GENERAL: PAINLEVEL_OUTOF10: 0 - NO PAIN

## 2024-01-03 ASSESSMENT — PAIN - FUNCTIONAL ASSESSMENT: PAIN_FUNCTIONAL_ASSESSMENT: 0-10

## 2024-01-03 NOTE — PROGRESS NOTES
Physical Therapy Treatment    Patient Name: Edilson Cantu  MRN: [id number]  Today's Date: 1/3/2024  Time Calculation  Start Time: 1206  Stop Time: 1245  Time Calculation (min): 39 min  Current Problem  1. Difficulty walking        2. Acute postoperative pain of right knee            Insurance:  Payor: [employer] / Plan: [employer] / Product Type: *No Product type* /   Number of Treatments Authorized: 13/30  Certification Period Start Date: 11/07/23  Certification Period End Date: 02/05/24    Subjective   General  Reason for Referral: Pt referred to PT for ongoing post op R TKR Rx  Referred By: Dr Gareth Reynolds  General Comment: PT STATES HE FELL GETTING OUT OF CAR ONTO SIDE WALK.  HE HAS AN ABRASSION ON HIS FOREHEAD FROM FALL.  EVERYTHING IS FEELING OK TODAY.    Performing HEP?: Yes    Precautions  Precautions  Precautions Comment: high fall risk, post op R TKR  Pain  Pain Assessment: 0-10  Pain Score: 0 - No pain    Objective   General Observation  General Observation: PT AMB WITH ROLLATOR       Treatments:    Therapeutic Exercise  Therapeutic Exercise Activity 1: [place] L3 X 6 MIN FOOT STRAPS USED  Therapeutic Exercise Activity 2: Slantboard Stretch: 2x1'  Therapeutic Exercise Activity 3: HEEL RAISES X 1 MIN  Therapeutic Exercise Activity 4: SIT TO STAND FROM CHAIR 1 BLUE PAD 2 X 10  Therapeutic Exercise Activity 5: FOOTWORM YELLOW LOOP ALONG // BAR X 2 MIN  Therapeutic Exercise Activity 6: HAM CURLS 50# X 2 MIN  Therapeutic Exercise Activity 7: LAQ R/L ALT 5# X 2 MIN    Balance/Neuromuscular Re-Education  Balance/Neuromuscular Re-Education Activity 1: AIREX BEAM BALANCING // BAR X 3 MIN  Balance/Neuromuscular Re-Education Activity 2: TILT BOARD F/B BALANCING X 2 MIN  Balance/Neuromuscular Re-Education Activity 3: TILT BOARD S/S TAPS // BAR X 2 MIN                        OP EDUCATION:  Outpatient Education  Education Comment: CONTINUE WITH CURRENT HEP    Assessment:  PT Assessment  Assessment  Comment: PT ANTONIO EX'S WELL.  HE CONTINUES TO SLOWLY PROGRESS WITH HIS STRENGTH.  HE IS CHALLENGED WITH SIT TO STAND IF HE DOESN'T HAVE A PAD TO SIT ON.  CHALLENGED WITH BALANCING ACTIVITIES.    Plan:  OP PT Plan  PT Plan: Skilled PT  Onset Date: 10/02/23  Certification Period Start Date: 11/07/23  Certification Period End Date: 02/05/24  Number of Treatments Authorized: 13/30    Goals:       Giovanny Harley, PTA

## 2024-01-11 ENCOUNTER — TREATMENT (OUTPATIENT)
Dept: PHYSICAL THERAPY | Facility: CLINIC | Age: 74
End: 2024-01-11
Payer: MEDICARE

## 2024-01-11 DIAGNOSIS — R26.2 DIFFICULTY WALKING: Primary | ICD-10-CM

## 2024-01-11 DIAGNOSIS — G89.18 ACUTE POSTOPERATIVE PAIN OF RIGHT KNEE: ICD-10-CM

## 2024-01-11 DIAGNOSIS — M25.561 ACUTE POSTOPERATIVE PAIN OF RIGHT KNEE: ICD-10-CM

## 2024-01-11 PROCEDURE — 97110 THERAPEUTIC EXERCISES: CPT | Mod: GP | Performed by: PHYSICAL MEDICINE & REHABILITATION

## 2024-01-11 PROCEDURE — 97530 THERAPEUTIC ACTIVITIES: CPT | Mod: GP | Performed by: PHYSICAL MEDICINE & REHABILITATION

## 2024-01-11 PROCEDURE — 97112 NEUROMUSCULAR REEDUCATION: CPT | Mod: GP | Performed by: PHYSICAL MEDICINE & REHABILITATION

## 2024-01-11 ASSESSMENT — PAIN SCALES - GENERAL: PAINLEVEL_OUTOF10: 0 - NO PAIN

## 2024-01-11 ASSESSMENT — PAIN - FUNCTIONAL ASSESSMENT: PAIN_FUNCTIONAL_ASSESSMENT: 0-10

## 2024-01-11 NOTE — PROGRESS NOTES
"  Physical Therapy Treatment    Patient Name: Edilson Cantu  MRN: [id number]  Today's Date: 1/11/2024  Time Calculation  Start Time: 1416  Stop Time: 1501  Time Calculation (min): 45 min  Current Problem  1. Difficulty walking        2. Acute postoperative pain of right knee            Insurance:  Number of Treatments Authorized: 2/30 (Used 12 visits in 2023)  Certification Period Start Date: 11/07/23  Certification Period End Date: 02/05/24    Subjective   General  Reason for Referral: Pt referred to PT for ongoing post op R TKR Rx  Referred By: Dr Gareth Reynolds  General Comment: Patient has no new complaints this visit. No increased pain following his previous session.    Performing HEP?: Yes    Precautions  Precautions  Precautions Comment: high fall risk, post op R TKR  Pain  Pain Assessment: 0-10  Pain Score: 0 - No pain    Objective   Treatments:    Therapeutic Exercise  Therapeutic Exercise Activity 1: SciFit: MAN L3 X 6MIN  Therapeutic Exercise Activity 2: Knee Ext Machine: 3x10 w 30#  Therapeutic Exercise Activity 3: Seated Hip Flexion: 3x10 each leg w 6#    Balance/Neuromuscular Re-Education  Balance/Neuromuscular Re-Education Activity 1: Biodex: LOS; Static; Easy x4 rounds; Best score: 47%  Balance/Neuromuscular Re-Education Activity 2: Tandem Stance: 2x30\" each side    Therapeutic Activity  Therapeutic Activity 1: Sit to stands with arms crossed: 3x10 from 21\" seat  Therapeutic Activity 2: Side Stepping Along edge of table: x5 laps down and back w RTB around ankles    Assessment:   Today's visit focused on progressing activities to improve patient's bilateral lower extremity strength, balance and functional ability. Patient demonstrated good effort toward today's progressions. No increased pain reported at the conclusion of the session. Overall response toward today's interventions was great.      Plan:  OP PT Plan  PT Plan: Skilled PT (Continue with current POC. Progress strength, balance and functional " ability as tolerated.)  Onset Date: 10/02/23  Certification Period Start Date: 11/07/23  Certification Period End Date: 02/05/24  Number of Treatments Authorized: 2/30 (Used 12 visits in 2023)    Goals:       Zachery Foster, PT

## 2024-01-18 ENCOUNTER — OFFICE VISIT (OUTPATIENT)
Dept: NEUROLOGY | Facility: CLINIC | Age: 74
End: 2024-01-18
Payer: MEDICARE

## 2024-01-18 VITALS
RESPIRATION RATE: 16 BRPM | WEIGHT: 153 LBS | SYSTOLIC BLOOD PRESSURE: 120 MMHG | BODY MASS INDEX: 22.59 KG/M2 | DIASTOLIC BLOOD PRESSURE: 77 MMHG | HEART RATE: 76 BPM

## 2024-01-18 DIAGNOSIS — G20.A1 PARKINSON'S DISEASE WITHOUT DYSKINESIA OR FLUCTUATING MANIFESTATIONS (MULTI): Primary | ICD-10-CM

## 2024-01-18 PROCEDURE — 1036F TOBACCO NON-USER: CPT | Performed by: PSYCHIATRY & NEUROLOGY

## 2024-01-18 PROCEDURE — 3074F SYST BP LT 130 MM HG: CPT | Performed by: PSYCHIATRY & NEUROLOGY

## 2024-01-18 PROCEDURE — 1160F RVW MEDS BY RX/DR IN RCRD: CPT | Performed by: PSYCHIATRY & NEUROLOGY

## 2024-01-18 PROCEDURE — 1159F MED LIST DOCD IN RCRD: CPT | Performed by: PSYCHIATRY & NEUROLOGY

## 2024-01-18 PROCEDURE — 1126F AMNT PAIN NOTED NONE PRSNT: CPT | Performed by: PSYCHIATRY & NEUROLOGY

## 2024-01-18 PROCEDURE — 3078F DIAST BP <80 MM HG: CPT | Performed by: PSYCHIATRY & NEUROLOGY

## 2024-01-18 PROCEDURE — 99213 OFFICE O/P EST LOW 20 MIN: CPT | Performed by: PSYCHIATRY & NEUROLOGY

## 2024-01-18 RX ORDER — IBUPROFEN 200 MG
TABLET ORAL
COMMUNITY
Start: 2018-01-23

## 2024-01-18 ASSESSMENT — ENCOUNTER SYMPTOMS
OCCASIONAL FEELINGS OF UNSTEADINESS: 0
DEPRESSION: 0
LOSS OF SENSATION IN FEET: 0

## 2024-01-18 ASSESSMENT — PATIENT HEALTH QUESTIONNAIRE - PHQ9
SUM OF ALL RESPONSES TO PHQ9 QUESTIONS 1 AND 2: 0
1. LITTLE INTEREST OR PLEASURE IN DOING THINGS: NOT AT ALL
2. FEELING DOWN, DEPRESSED OR HOPELESS: NOT AT ALL

## 2024-01-18 NOTE — PATIENT INSTRUCTIONS
I'm glad you continue to respond carbidopa/levodopa dose. Please continue the same dose for now. we can increase it in the future if needed.      continue fast-paced exercise as much as tolerated.      Come back to see us in 6 to 8 months.      Thank you very much for coming to see me today.     Rizwan Landeros MD, PhD   of Neurology  [facility] [employer]  Director of Neuroimmunology and staff neurologist at the [facility]  [facility]

## 2024-01-18 NOTE — PROGRESS NOTES
"Diagnoses/Problems     · Parkinson's disease (332.0) (G20)     Chief Complaint  PD.      History of Present Illness  Chief Complaint: 6 month [facility] PD     History of Present Illness:   Mr. Cantu is a 69 YO white man with PD, HTN, and lumbar canal stenosis s/p surgery who presents for follow-up of PD.      Interval hx:  Largely stable. Falls still a problem, mostly backwards. Had knee replacement without issues and his pain and walking improved some. Dream enactment is getting somewhat better. Has mild nighttime confusion if he wakes up from sleep. Doing exercise. Will keep sinemet dose the same.      Motor Sx:  Tremor: Very slight tremor but not bothersome  Bradykinesia: Slower with ADLs and movement   Rigidity: Shoulder pain and fatigue as though he just worked out the day before  Postural instability: \"Not great\" but working with PT. Standing unsupported in one place is hard for him. He has to be careful with turning. He has fallen 2x in the last 6 months (one time stepped off a curb and didn't see it, the other fall was unexplained he was standing and just fell). Gait is OK. He can walk 1/4 mile without taking a break but then has to sit down because of lumbar stenosis sx. He does tend to shuffle his feet and freeze from time to time.     Reports sx are stably constant throughout the day. No motor fluctuations or dyskinesias. No side effects from C/L.      NMSx:  Constipation: BM q2-3 days. Uses a bowel regimen   OH: Denies  Urinary sx: Denies  Cognition: Pretty good. Memory. No hallucinations  Hyposmia: Fine  RSBD: None  Mood: Generally pretty up      Review of Systems:   - 14 point ROS reviewed and otherwise negative except as stated above     Past Medical/Surgical History:  - PD  - Chronic low back pain   - Lumbar stenosis s/p L3-5 laminectomy with bl foraminotomies in 2018  - HTN  - Knee arthroscopy     Home Meds:  - C/L 25/100 1.5 tabs 8:30, 11:30, 2:30, 5:30  - Amlodipine 5mg daily  - HCTZ 25mg daily  - " Colace 100mg BID PRN  - Miralax daily PRN  - Meloxicam PRN      Allergies: NKDA     Social History:   - Living situation: Lives with wife  - Baseline function: Ambulates with a cane for the last 5 years   - 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   - Tobacco use: Denies  - Alcohol use: 2 glasses of wine per day  - Illicit drug use: Denies     Family History:   - No FHx of PD or neurological dz        Active Problems  Problems    · Abdominal hernia (553.9) (K46.9)   · Acquired forefoot supination (736.79) (M21.6X9)   · Acquired short Achilles tendon (727.81) (M67.00)   · Axonal polyneuropathy (356.9) (G62.9)   · Benign hypertension (401.1) (I10)   · Bilateral edema of lower extremity (782.3) (R60.0)   · Bilateral lumbar radiculopathy (724.4) (M54.16)   · BPH (benign prostatic hyperplasia) (600.00) (N40.0)   · Chronic low back pain (724.2,338.29) (M54.50,G89.29)   · Community acquired pneumonia (486) (J18.9)   · Constipation, chronic (564.00) (K59.09)   · Difficulty in walking (719.7) (R26.2)   · Encounter for immunization (V03.89) (Z23)   · Gait instability (781.2) (R26.81)   · Hallux rigidus (735.2) (M20.20)   · Impacted cerumen of left ear (380.4) (H61.22)   · Impaired gait and mobility (781.2) (R26.89)   · Knee osteoarthritis (715.36) (M17.9)   · Left foot drop (736.79) (M21.372)   · Left hip pain (719.45) (M25.552)   · Left knee pain (719.46) (M25.562)   · Lower extremity pain (729.5) (M79.606)   · Added by Problem List Migration; 2013-11-24   · Lumbar and sacral spondyloarthritis (721.3) (M47.817)   · Lumbar degenerative disc disease (722.52) (M51.36)   · Lumbar pain (724.2) (M54.50)   · Lumbar spondylosis (721.3) (M47.816)   · Lumbar stenosis with neurogenic claudication (724.03) (M48.062)   · Lumbosacral spinal stenosis (724.02) (M48.07)   · Muscle weakness-general (728.87) (M62.81)   · Myofascial pain (729.1) (M79.18)   · Osteoarthritis of right hip (715.95) (M16.11)   · Other  constipation (564.09) (K59.09)   · Parkinson's disease (332.0) (G20)   · Peripheral neuropathy (356.9) (G62.9)   · Peroneal tendonitis (726.79) (M76.70)   · Postlaminectomy syndrome, lumbar region (722.83) (M96.1)   · Primary localized osteoarthritis of right knee (715.16) (M17.11)   · Right hip pain (719.45) (M25.551)   · Right knee pain (719.46) (M25.561)   · Right lumbar radiculopathy (724.4) (M54.16)   · Rigidity (muscles) (780.99) (R29.898)   · Sacroiliac joint pain (724.6) (M53.3)   · Screening for colon cancer (V76.51) (Z12.11)   · Screening PSA (prostate specific antigen) (V76.44) (Z12.5)   · Shuffling gait (781.2) (R26.89)   · Stooped posture (781.92) (R29.3)   · Tremor (781.0) (R25.1)   · Vascular claudication (443.9) (I73.9)   · Venous insufficiency (459.81) (I87.2)   · Vitamin D deficiency (268.9) (E55.9)     Past Medical History  Problems    · Encounter for preventive health examination (V70.0) (Z00.00)   · Resolved Date: 01 Jan 1900     Surgical History  Problems    · History of Knee Arthroscopy (Therapeutic)   · right knee   · History of Lower back surgery     Family History  Father    · Family history of cardiac disorder (V17.49) (Z82.49)     Social History  Problems    ·    · Moderate drinker of alcohol (V49.89) (Z78.9)   · Never smoker   · Retired from employment     Allergies  Medication    · No Known Drug Allergies   Recorded By: Jillian Deal; 11/7/2014 8:17:30 AM          Physical Exam  See UPDRS        MDS UPDRS Examination   Is the patient on medication for treating the symptoms of Parkinson's disease? Yes.   If the patient is receiving medication for treating the symptoms of Parkinson's disease, ana the patient's clinical state using the following definitions: ON: On is the typical functional state when patients are receiving medication and have a good response.   Is the patient on levodopa? Yes: 20 minutes.   Stimulator State: N/A   Speech: 1   Facial Expression: 1   Rigidity:  Neck - 1. RUE - 0. RLE - 0. LUE - 1. LLE - 0.   Finger Taps: Right - 1. Left - 1.   Hand Movements: Right - 1. Left - 2.   Pronation-Supination: Right - 0. Left - 0.   Toe Tapping: Right - 2. Left - 2.   Leg Agility: Right - 1. Left - 1.   Arising from chair: 3   Gait: 2   Freezing of Gait: 0   Postural Stability: 3   Posture: 2   Body Bradykinesia: 2   Postural Tremor: Right Hand - 1. Left Hand - 1.   Kinetic Tremor: Right Hand - 1. Left Hand - 1.   Rest Tremor: RUE - 1. RLE - 0. LUE - 0. LLE - 0.   Lip/Jaw: 0   Constancy of Rest Tremor: 1   MDS-UPDRS Total Score: 33. Dyskinesias were not present during exam. They did not interfere with ratings.   Lora and Yahr Stage: 3: Mild to moderate involvement; some postural instability but physically independent; needs assistance to recover from pull test.     Provider Impressions  Mr. Cantu is a 71 YO man with lumbar stenosis, OA of the knees and PD who presented in 2020 with freezing of gait, poor balance, RUE intermittent rest tremor, micrographia, and constipation. He denies dream enactment and anosmia. His virtual exam is positive for mild hypomimia, bradykinesia with rapid repetitive movement L>R, stooping, absent arm swing bilaterally, and shuffling gait. Most likely idiopathic Parkinson's disease overlapping with lumbar canal stenosis and nutritional neuropathy. Will try him on sinemet and will refer him to PT. Follow up after 3 months.      12/15/2020: He was taking sinemet at 8 AM, 4 PM, and 9 PM. He did not notice any beneift nor side effects. He did some PT then stopped because of the second wave of COVID. He reports new severe right quad pain and is planning to talk to Dr. Reich about it. I will increase his sinemet dose gradually to 1.5 tabs four times daily and adjust timing.      03/08/2021: No benefit or side effect from the higher dose of sinemet. He wants to go back to 1 tablet three times daily, which is reasonable. He hasn't been able to exercise  because of his quad pain. He is getting back steroid injections for it.      06/14/2021: reports some benefit from sinemet on walking without side effects. First in-person exam shows a UPDRS of 28 two hours after sinemet. Will increase the dose.      02/10/2022: can't tell if the higher dose of sinemet helped. No side effects. balance is worsening but responds to PT. UPDRS improved five points on today's on-exam compared to last time. will continue the same sinemet dose and add miralax for worsening constipation.      9/8/2022: UPDRS 33 from 23 but he was off and just took his dose of sinemet. After 30 minutes into dose his bradykinesia was improved on repeat exam. Overall seems to be somewhat responsive to sinemet, and it is possible he does not report significant improvement due to his confounding comorbidities of lumbar stenosis and OA. Will try to raise sinemet further and assess response. No red flags for atypical parkinsonism at this time     06/08/2023: The latest increase in dose did help his symptoms to some degree especially feeling less jittery but it did not help his balance and he continues with very frequent falls and near falls. found PT helpful but can't participate fully because of his bad right knee. He will have knee replacement soon and was wondering if PD can have implications to his knee surgery. I discussed the possibility of transient worsening of PD symptoms around surgery time and the importance of not changing sinemet dose around the time of surgery.      1/18/2024: Largely stable. Falls still a problem, mostly backwards. Had knee replacement without issues and his pain and walking improved some. Dream enactment is getting somewhat better. Has mild nighttime confusion if he wakes up from sleep. Doing exercise. Will keep sinemet dose the same.      PLAN:  I'm glad you continue to respond carbidopa/levodopa dose. Please continue the same dose for now. we can increase it in the future if  needed.      continue fast-paced exercise as much as tolerated.      Come back to see us in 6 to 8 months.      The impression and plan were discussed with the patient and their family (if present) in detail and all questions answered. They agreed to the plan as outlined above.        I spent 20 minutes with this patient. Greater than 50% of this time was spent in counseling and/or coordination of care.     Rizwan Landeros MD, PhD   of Neurology  [employer]  Director of Neuroimmunology and staff neurologist at the [facility]  [facility]

## 2024-02-05 ENCOUNTER — TREATMENT (OUTPATIENT)
Dept: PHYSICAL THERAPY | Facility: CLINIC | Age: 74
End: 2024-02-05
Payer: MEDICARE

## 2024-02-05 DIAGNOSIS — R26.2 DIFFICULTY WALKING: Primary | ICD-10-CM

## 2024-02-05 DIAGNOSIS — G89.18 ACUTE POSTOPERATIVE PAIN OF RIGHT KNEE: ICD-10-CM

## 2024-02-05 DIAGNOSIS — M25.561 ACUTE POSTOPERATIVE PAIN OF RIGHT KNEE: ICD-10-CM

## 2024-02-05 PROCEDURE — 97112 NEUROMUSCULAR REEDUCATION: CPT | Mod: GP

## 2024-02-05 PROCEDURE — 97110 THERAPEUTIC EXERCISES: CPT | Mod: GP

## 2024-02-05 ASSESSMENT — PAIN DESCRIPTION - DESCRIPTORS: DESCRIPTORS: ACHING;OTHER (COMMENT)

## 2024-02-05 ASSESSMENT — PAIN SCALES - GENERAL: PAINLEVEL_OUTOF10: 7

## 2024-02-05 ASSESSMENT — PAIN - FUNCTIONAL ASSESSMENT: PAIN_FUNCTIONAL_ASSESSMENT: 0-10

## 2024-02-05 NOTE — PROGRESS NOTES
"  Physical Therapy Treatment/Re Check    Patient Name: Edilson Cantu  MRN: [id number]  Today's Date: 2/5/2024  Time Calculation  Start Time: 0957  Stop Time: 1044  Time Calculation (min): 47 min  Current Problem  1. Difficulty walking        2. Acute postoperative pain of right knee  Follow Up In Physical Therapy          Insurance:  Number of Treatments Authorized: 3/30 (Used 12 visits in 2023 this episode)  Certification Period Start Date: 02/05/24  Certification Period End Date: 05/05/24    Subjective   General  Referred By: Dr Gareth Reynolds  General Comment: Pt feels his knee is doing well, primary problem continues to be his balance and falling; sees ortho next week; want to get the left knee done soon; no falls while away- \"was very careful\"        Performing HEP?: Yes    Precautions  Precautions  Precautions Comment: high fall risk, post op R TKR  Pain  Pain Assessment: 0-10  Pain Score: 7  Pain Type: Chronic pain  Pain Location: Back (Knee feels fine)  Pain Descriptors: Aching, Other (Comment) (stiff)    Objective   General Assessments:  Posture Comment: signif kyphotic, flexed at cerv spine- able to correct somewhat w/o LOB backward now     Range of Motion Comments: R knee 0--> 130 AROM w/o pain; gross LE AROM WFL except B DF to 5,feet invert w/ DF attempt; PROM W/ rigidity BLE; L knee lacks full ext w/ pain     Strength Comments: R knee flex 4+/5, ext 4/5- no quad lag w/ SLR; hip strength 4/5 grossly, L ankle DF 3+/5, R 4-/5     Flexibility Comment: tight B HS at -30; DF to 5 bilat, HF 25% limited bilat; B hip adductors 25% limited     Palpation Comment: no palpable pain R knee; circumference at knee 40 cm R, 38 cm L ; L knee tender to palp    Functional Assessments:  Gait Comment: Pt using rollator primarily for gait, more erect posture w/ longer strides, occas shuffle LE; pt able to walk w/ a st cane w/ good cris- moves to quickly w/ turns and position changes and loses balance   , Balance Comment: Pt can " "stand unsupported for 1 minute, narrow MARIAMA stand x 30 sec, tandem R post = 30 sec, L post = 5 sec; SLS: 3 sec w/ LOB     , Bed Mobility Comment: rolling  and supine to sit slow/indep  , and Transfers Comment: light reliance on UE for sit to stand, w/ 3 inch elevation of seat pt can perform w/o UE- tends to lose balance backward     Outcome Measures:  Other Measures  Lower Extremity Funtional Score (LEFS): 37/80    Treatments:  Therapeutic Exercise  SciFit LE hills L1 X 6 MIN- ROM/rhythmic movt LE  Knee Ext Machine: 2 x 1'  w 30#  R/L side step along // bar w/ yellow loop 2 x 1'  R/L hip ext taps w/ yellow loop 2 x 1'  R/L hip flex w/ yellow loop 2 x 1'  Seated w/ UE on SB- FF x 1', R/L side bend x 1'  Seated w/ sm ball mid back- thoracic ext w/ shld abd 5 sec x 1'    Gait practice in dept w/ cane and CGA- cues for posture and safety w/ turning     Balance/Neuromuscular Re-Education  Airex DLB x 1', R/L weight shift x 1'; R/l cerv rotation x 5, nods x 5  Airex narrow MARIAMA stand x 2'  Tandem Stance: 2 x 30\" each side  Sit to stand from chair w/ 1 high density foam pad- 2 x 5- DLB w/o support x 10 sec each rep   BOSU R/L lunges w/o UE as able x 10 ea                                    Assessment:  PT Assessment  Assessment Comment: Pt has regained functional use of RLE post TKR, no pain or swelling, ROM is WFL and strength improved; pt w/o increased LBP during Rx- hip strength cont to be limited and pt's balance/safety awareness w/ mobility compromised due to PD diagnosis; pt to contact neuro MD re: order to cont Rx w/ primary focus on gait/balance and safety w/ mobility    Plan:  OP PT Plan  Treatment/Interventions: Education/ Instruction, Gait training, Neuromuscular re-education, Therapeutic activities, Therapeutic exercises  PT Plan: Skilled PT  PT Frequency: 1 time per week  Duration: 8 weeks  Onset Date: 10/02/23  Certification Period Start Date: 02/05/24  Certification Period End Date: 05/05/24  Number of " Treatments Authorized: 3/30 (Used 12 visits in 2023 this episode)    Goals:  Active       PT Problem       PT Goal 1       Start:  02/05/24    Expected End:  05/05/24       *STG MET*  LTG:   - Progress HEP and OMID within pt tolerance to goal attainment, including return to ADL/IADL and functional activities w/ less fall potential/improved safety  - Pt. will have improved balance ability as evidenced by increased SLS time of 10 sec for improved everyday balance and less fall potential  - Pt. will have safe, independent ambulation with safest/least restrictive assistive device for community distances and stairs/curbs with normalized gait pattern  - Pt. will have increased strength of at least 1/3 muscle grade in affected muscle groups- B hips  - Pt. will have at least 8 point improvement in functional test score- LEFS-as evidenced by self report of improved ability to perform every day activities w/ greater ease and less pain  - Pt will be supervised and a gait belt will be used if needed to assure safety in the clinic during treatment          Patient Stated Goal 1       Start:  02/05/24    Expected End:  05/05/24       Safely perform all daily activities w/o falling                 Talya Britton PT

## 2024-02-06 ENCOUNTER — TELEPHONE (OUTPATIENT)
Dept: NEUROLOGY | Facility: HOSPITAL | Age: 74
End: 2024-02-06
Payer: MEDICARE

## 2024-02-06 DIAGNOSIS — G20.A1 PARKINSON'S DISEASE WITHOUT DYSKINESIA OR FLUCTUATING MANIFESTATIONS (MULTI): Primary | ICD-10-CM

## 2024-02-06 NOTE — TELEPHONE ENCOUNTER
Edilson Cantu called.  He would like to request an order for PT. He has an appt upcoming within the  team.

## 2024-02-07 ENCOUNTER — TREATMENT (OUTPATIENT)
Dept: PHYSICAL THERAPY | Facility: CLINIC | Age: 74
End: 2024-02-07
Payer: MEDICARE

## 2024-02-07 DIAGNOSIS — R26.2 DIFFICULTY WALKING: Primary | ICD-10-CM

## 2024-02-07 DIAGNOSIS — G89.18 ACUTE POSTOPERATIVE PAIN OF RIGHT KNEE: ICD-10-CM

## 2024-02-07 DIAGNOSIS — M25.561 ACUTE POSTOPERATIVE PAIN OF RIGHT KNEE: ICD-10-CM

## 2024-02-07 PROCEDURE — 97110 THERAPEUTIC EXERCISES: CPT | Mod: GP | Performed by: PHYSICAL MEDICINE & REHABILITATION

## 2024-02-07 PROCEDURE — 97530 THERAPEUTIC ACTIVITIES: CPT | Mod: GP | Performed by: PHYSICAL MEDICINE & REHABILITATION

## 2024-02-07 PROCEDURE — 97112 NEUROMUSCULAR REEDUCATION: CPT | Mod: GP | Performed by: PHYSICAL MEDICINE & REHABILITATION

## 2024-02-07 ASSESSMENT — PAIN SCALES - GENERAL: PAINLEVEL_OUTOF10: 7

## 2024-02-07 ASSESSMENT — PAIN - FUNCTIONAL ASSESSMENT: PAIN_FUNCTIONAL_ASSESSMENT: 0-10

## 2024-02-07 NOTE — PROGRESS NOTES
"  Physical Therapy Treatment    Patient Name: Edilson Cantu  MRN: [id number]  Today's Date: 2/7/2024  Time Calculation  Start Time: 1317  Stop Time: 1359  Time Calculation (min): 42 min  Current Problem  1. Difficulty walking        2. Acute postoperative pain of right knee            Insurance:  Number of Treatments Authorized: 4/30 (Used 12 visits in 2023 this episode)  Certification Period Start Date: 02/05/24  Certification Period End Date: 05/05/24    Subjective   General  Referred By: Dr Gareth Reynolds  General Comment: Patient reports no new complaints this visit regarding his right knee. He is scheduled to follow up with his ortho physician next week. He notes that his primary concern continues to be his limited balance and fall risk.    Performing HEP?: Yes    Precautions  Precautions  Precautions Comment: high fall risk, post op R TKR  Pain  Pain Assessment: 0-10  Pain Score: 7    Objective   Treatments:    Therapeutic Exercise  Therapeutic Exercise Activity 1: SciFit: MAN L3 X 6MIN  Therapeutic Exercise Activity 2: Seated Hip Flexion: 3x10 each leg w 5#  Therapeutic Exercise Activity 3: Seated LAQ: 3x10 w 5#    Balance/Neuromuscular Re-Education  Balance/Neuromuscular Re-Education Activity 1: Biodex: LOS; L 12; Easy x4 rounds; Best score: 54%  Balance/Neuromuscular Re-Education Activity 2: BOSU Lunges: 2x10 each leg w UE support as needed    Therapeutic Activity  Therapeutic Activity 1: Sit to stands with arms crossed: 6x5 from 21\" seat  Therapeutic Activity 2: Side Stepping Along edge of table: x10 laps down and back w RTB around ankles    Assessment:  PT Assessment  Assessment Comment: Today's visit focused on progressing activities to improve patient's lower extremity strength, balance and functional ability. Patient demonstrated good effort toward today's progressions. No increased pain reported at the conclusion of the session. Overall response toward today's interventions was great.    Plan:  OP PT " Plan  PT Plan: Skilled PT (Continue with current POC. Progress lower extremity strength and balance as tolerated.)  PT Frequency: 1 time per week  Duration: 8 weeks  Onset Date: 10/02/23  Certification Period Start Date: 02/05/24  Certification Period End Date: 05/05/24  Number of Treatments Authorized: 4/30 (Used 12 visits in 2023 this episode)    Goals:  Active       PT Problem       PT Goal 1       Start:  02/05/24    Expected End:  05/05/24       *STG MET*  LTG:   - Progress HEP and OMID within pt tolerance to goal attainment, including return to ADL/IADL and functional activities w/ less fall potential/improved safety  - Pt. will have improved balance ability as evidenced by increased SLS time of 10 sec for improved everyday balance and less fall potential  - Pt. will have safe, independent ambulation with safest/least restrictive assistive device for community distances and stairs/curbs with normalized gait pattern  - Pt. will have increased strength of at least 1/3 muscle grade in affected muscle groups- B hips  - Pt. will have at least 8 point improvement in functional test score- LEFS-as evidenced by self report of improved ability to perform every day activities w/ greater ease and less pain  - Pt will be supervised and a gait belt will be used if needed to assure safety in the clinic during treatment          Patient Stated Goal 1       Start:  02/05/24    Expected End:  05/05/24       Safely perform all daily activities w/o falling              Zachery Foster, PT

## 2024-02-08 ENCOUNTER — APPOINTMENT (OUTPATIENT)
Dept: ORTHOPEDIC SURGERY | Facility: CLINIC | Age: 74
End: 2024-02-08
Payer: MEDICARE

## 2024-02-13 ENCOUNTER — TREATMENT (OUTPATIENT)
Dept: PHYSICAL THERAPY | Facility: CLINIC | Age: 74
End: 2024-02-13
Payer: MEDICARE

## 2024-02-13 DIAGNOSIS — G89.18 ACUTE POSTOPERATIVE PAIN OF RIGHT KNEE: ICD-10-CM

## 2024-02-13 DIAGNOSIS — R26.2 DIFFICULTY WALKING: Primary | ICD-10-CM

## 2024-02-13 DIAGNOSIS — M25.561 ACUTE POSTOPERATIVE PAIN OF RIGHT KNEE: ICD-10-CM

## 2024-02-13 PROCEDURE — 97110 THERAPEUTIC EXERCISES: CPT | Mod: GP

## 2024-02-13 PROCEDURE — 97112 NEUROMUSCULAR REEDUCATION: CPT | Mod: GP

## 2024-02-13 ASSESSMENT — PAIN SCALES - GENERAL: PAINLEVEL_OUTOF10: 3

## 2024-02-13 ASSESSMENT — PAIN - FUNCTIONAL ASSESSMENT: PAIN_FUNCTIONAL_ASSESSMENT: 0-10

## 2024-02-13 NOTE — PROGRESS NOTES
Physical Therapy Treatment    Patient Name: Edilson Cantu  MRN: [id number]  Today's Date: 2/13/2024  Time Calculation  Start Time: 1100  Stop Time: 1143  Time Calculation (min): 43 min  PT Therapeutic Procedures Time Entry  Neuromuscular Re-Education Time Entry: 16  Therapeutic Exercise Time Entry: 27,      Current Problem  1. Difficulty walking        2. Acute postoperative pain of right knee            Insurance:  Number of Treatments Authorized: 5/30 (Used 12 visits in 2023 this episode)  Certification Period Start Date: 02/05/24  Certification Period End Date: 05/05/24    Subjective   General  Referred By: Dr Landeros (Added orders to cont PT- primary gait/balance/PD)  General Comment: No issues to report, knee is fine LBP 3/10        Performing HEP?: Yes    Precautions  Precautions  Precautions Comment: high fall risk, post op R TKR  Pain  Pain Assessment: 0-10  Pain Score: 3  Pain Location: Back    Objective   Good swing LE w/ use of cane, no shuffling- light support for safe turning  LLE weaker than R- signif crepitus L knee w/ TE       Treatments:  Therapeutic Exercise  Therapeutic Exercise Activity 1: SciFit LE hills L1 X 6MIN- reciprocal movt pattern   Therapeutic Exercise Activity 2: Seated Hip Flexion: x 2'- ALT R/L w 5#  Therapeutic Exercise Activity 3: Seated LAQ R/L ALT 5# x 2'  Incline GS stretch x 2'  Sit to stand w/o UE- 1 Airex in seat 1 x 10, 1 x 5- trunk guided and cues for form  YTB loop R/L side step along // bar x 2'- LLE fatigued  R/L hip ext taps w/ YTB loop x 2' ALT    Gait practice w/ cane 2 x 100 ft- CGA/ SBA and cues     Balance/Neuromuscular Re-Education  Balance/Neuromuscular Re-Education Activity 1: Biodex: LOS; L 12; Easy x 4 rounds; Best score: 54%  Balance/Neuromuscular Re-Education Activity 2: BOSU Lunges: 2x10 each leg w UE support as needed  Airex balance beam tandem walking x 1'                                       Assessment:     Pt tolerated today's Rx well, making progress  toward goals and demonstrating increased mobility, strength, balance, and tolerance for therapeutic exercises. Pt's pain level in back lessened w/ Rx. No R knee pain, mild L.   Pt is HEP compliant. Pt had no issues w/ today's session.   Plan:  OP PT Plan  Treatment/Interventions: Education/ Instruction, Neuromuscular re-education, Therapeutic activities, Therapeutic exercises, Gait training  PT Plan: Skilled PT  Onset Date: 10/02/23  Certification Period Start Date: 02/05/24  Certification Period End Date: 05/05/24  Number of Treatments Authorized: 5/30 (Used 12 visits in 2023 this episode)    Goals:  Active       PT Problem       PT Goal 1       Start:  02/05/24    Expected End:  05/05/24       *STG MET*  LTG:   - Progress HEP and OMID within pt tolerance to goal attainment, including return to ADL/IADL and functional activities w/ less fall potential/improved safety  - Pt. will have improved balance ability as evidenced by increased SLS time of 10 sec for improved everyday balance and less fall potential  - Pt. will have safe, independent ambulation with safest/least restrictive assistive device for community distances and stairs/curbs with normalized gait pattern  - Pt. will have increased strength of at least 1/3 muscle grade in affected muscle groups- B hips  - Pt. will have at least 8 point improvement in functional test score- LEFS-as evidenced by self report of improved ability to perform every day activities w/ greater ease and less pain  - Pt will be supervised and a gait belt will be used if needed to assure safety in the clinic during treatment          Patient Stated Goal 1       Start:  02/05/24    Expected End:  05/05/24       Safely perform all daily activities w/o falling              Talya Britton PT

## 2024-02-15 ENCOUNTER — APPOINTMENT (OUTPATIENT)
Dept: ORTHOPEDIC SURGERY | Facility: CLINIC | Age: 74
End: 2024-02-15
Payer: MEDICARE

## 2024-02-15 ENCOUNTER — HOSPITAL ENCOUNTER (OUTPATIENT)
Dept: RADIOLOGY | Facility: CLINIC | Age: 74
Discharge: HOME | End: 2024-02-15
Payer: MEDICARE

## 2024-02-15 DIAGNOSIS — M17.0 PRIMARY OSTEOARTHRITIS OF BOTH KNEES: ICD-10-CM

## 2024-02-15 DIAGNOSIS — M17.0 PRIMARY OSTEOARTHRITIS OF BOTH KNEES: Primary | ICD-10-CM

## 2024-02-15 PROCEDURE — 73564 X-RAY EXAM KNEE 4 OR MORE: CPT | Mod: RT

## 2024-02-16 ENCOUNTER — APPOINTMENT (OUTPATIENT)
Dept: PHYSICAL THERAPY | Facility: CLINIC | Age: 74
End: 2024-02-16
Payer: MEDICARE

## 2024-02-28 ENCOUNTER — TREATMENT (OUTPATIENT)
Dept: PHYSICAL THERAPY | Facility: CLINIC | Age: 74
End: 2024-02-28
Payer: MEDICARE

## 2024-02-28 DIAGNOSIS — R26.2 DIFFICULTY WALKING: Primary | ICD-10-CM

## 2024-02-28 DIAGNOSIS — G89.18 ACUTE POSTOPERATIVE PAIN OF RIGHT KNEE: ICD-10-CM

## 2024-02-28 DIAGNOSIS — M25.561 ACUTE POSTOPERATIVE PAIN OF RIGHT KNEE: ICD-10-CM

## 2024-02-28 PROCEDURE — 97110 THERAPEUTIC EXERCISES: CPT | Mod: GP

## 2024-02-28 PROCEDURE — 97112 NEUROMUSCULAR REEDUCATION: CPT | Mod: GP

## 2024-02-28 ASSESSMENT — PAIN SCALES - GENERAL: PAINLEVEL_OUTOF10: 5 - MODERATE PAIN

## 2024-02-28 ASSESSMENT — PAIN - FUNCTIONAL ASSESSMENT: PAIN_FUNCTIONAL_ASSESSMENT: 0-10

## 2024-02-28 NOTE — PROGRESS NOTES
Physical Therapy Treatment    Patient Name: Edilson Cantu  MRN: [id number]  Today's Date: 2/28/2024  Time Calculation  Start Time: 1240  Stop Time: 1325  Time Calculation (min): 45 min  PT Therapeutic Procedures Time Entry  Neuromuscular Re-Education Time Entry: 17  Therapeutic Exercise Time Entry: 28,      Current Problem  1. Difficulty walking        2. Acute postoperative pain of right knee            Insurance:  Number of Treatments Authorized: 6/30 (Used 12 visits in 2023 this episode)  Certification Period Start Date: 02/05/24  Certification Period End Date: 05/05/24    Subjective   General  General Comment: Pt feeling achy allo beatris today- ?weather, back hurting the most- R knee has been great; trying to avoid the floor      Performing HEP?: Yes    Precautions  Precautions  Precautions Comment: high fall risk, post op R TKR  Pain  Pain Assessment: 0-10  Pain Score: 5 - Moderate pain  Pain Location: Back    Objective   Good stride lengths w/ gait using trekking- PVC- pole w/ GCA and VC  Attempting to let go of // bar w/ balance tasks- tends to lose balance BKWD- some self correction observed      Treatments:  Therapeutic Exercise  Therapeutic Exercise Activity 1: SciFit Proofpoint L1 X 6MIN- reciprocal movt pattern   Therapeutic Exercise Activity 2: Seated Hip Flexion: 2 x 10 R/L w 5#  Therapeutic Exercise Activity 3: Seated LAQ R/L 5# 2 x 10  Incline GS stretch x 2'; HT raises x 1'  Sit to stand w/o UE- 2 Airex in seat 2 x 10- trunk guided and cues for form; stand w/ DLB and proper posture 1' x 2 during sit to stand  YTB loop R/L ALT hip abd w/ yellow loop 2 x 1'  R/L hip ext taps w/ YTB loop 2 x 1' ALT  Seated w/ UE on SB- FF x 1', R/L SB x 1'     Gait practice w/ trekking pole 2 x 100 ft- CGA and cues      Balance/Neuromuscular Re-Education  Balance/Neuromuscular Re-Education Activity 1: Airex narrow MARIAMA stand x 1'   Airex DLB- R/L UE lat reaches x 10 ea- touch // bar  Balance/Neuromuscular Re-Education  "Activity 2: BOSU Lunges: 2x10 each leg w UE support as needed  Airex balance beam tandem walking x 5 passes- fatigued   Airex march- tap step x 2'                                     Assessment:  PT Assessment  Assessment Comment: Pt noted lessening of LBP sx as Rx progressed; posture remains flexed though w/ cues pt able to erect spine/hold head and eyes up and stand w/o support up to 2-3 minutes at a time before losing postural control; pt was able to use a \"trekking\" pole w/ some success- needed cues and guidance- good strides and turns during practice; pt cont to struggle w/ higher level balance tasks w/ need for UE support or CGA for safety    Plan:  OP PT Plan  PT Plan: Skilled PT  Onset Date: 10/02/23  Certification Period Start Date: 02/05/24  Certification Period End Date: 05/05/24  Number of Treatments Authorized: 6/30 (Used 12 visits in 2023 this episode)  Cont Rx for ROM/flexibility, sx mgt, gait/balance and transfer training, mm re-ed, core stabilization, HEP w/ progression, posture, body mechanics and safety training so that pt can perform daily functional activities w/ less pain, more safely and w/ greater ease   Goals:  Active       PT Problem       PT Goal 1       Start:  02/05/24    Expected End:  05/05/24       *STG MET*  LTG:   - Progress HEP and OMID within pt tolerance to goal attainment, including return to ADL/IADL and functional activities w/ less fall potential/improved safety  - Pt. will have improved balance ability as evidenced by increased SLS time of 10 sec for improved everyday balance and less fall potential  - Pt. will have safe, independent ambulation with safest/least restrictive assistive device for community distances and stairs/curbs with normalized gait pattern  - Pt. will have increased strength of at least 1/3 muscle grade in affected muscle groups- B hips  - Pt. will have at least 8 point improvement in functional test score- LEFS-as evidenced by self report of improved " ability to perform every day activities w/ greater ease and less pain  - Pt will be supervised and a gait belt will be used if needed to assure safety in the clinic during treatment          Patient Stated Goal 1       Start:  02/05/24    Expected End:  05/05/24       Safely perform all daily activities w/o falling              Talya Britton PT

## 2024-03-05 ENCOUNTER — APPOINTMENT (OUTPATIENT)
Dept: PHYSICAL THERAPY | Facility: CLINIC | Age: 74
End: 2024-03-05
Payer: MEDICARE

## 2024-03-05 ENCOUNTER — DOCUMENTATION (OUTPATIENT)
Dept: PHYSICAL THERAPY | Facility: CLINIC | Age: 74
End: 2024-03-05
Payer: MEDICARE

## 2024-03-05 NOTE — PROGRESS NOTES
Physical Therapy Treatment    Patient Name: Edilson Cantu  MRN: [id number]  Today's Date: 3/5/2024      ,      Current Problem  No diagnosis found.    Insurance:             Subjective   General       Performing HEP?: {Yes/No:99697}    Precautions     Pain       Objective       Treatments:  Therapeutic Exercise  Therapeutic Exercise Activity 1: SciFit LE hills L1 X 6MIN- reciprocal movt pattern   Therapeutic Exercise Activity 2: Seated Hip Flexion: 2 x 10 R/L w 5#  Therapeutic Exercise Activity 3: Seated LAQ R/L 5# 2 x 10  Incline GS stretch x 2'; HT raises x 1'  Sit to stand w/o UE- 2 Airex in seat 2 x 10- trunk guided and cues for form; stand w/ DLB and proper posture 1' x 2 during sit to stand  YTB loop R/L ALT hip abd w/ yellow loop 2 x 1'  R/L hip ext taps w/ YTB loop 2 x 1' ALT  Seated w/ UE on SB- FF x 1', R/L SB x 1'     Gait practice w/ trekking pole 2 x 100 ft- CGA and cues      Balance/Neuromuscular Re-Education  Balance/Neuromuscular Re-Education Activity 1: Airex narrow MARIAMA stand x 1'   Airex DLB- R/L UE lat reaches x 10 ea- touch // bar  Balance/Neuromuscular Re-Education Activity 2: BOSU Lunges: 2x10 each leg w UE support as needed  Airex balance beam tandem walking x 5 passes- fatigued   Airex march- tap step x 2'                                OP EDUCATION:       Assessment:       Plan:       Goals:  Active       PT Problem       PT Goal 1       Start:  02/05/24    Expected End:  05/05/24       *STG MET*  LTG:   - Progress HEP and OMID within pt tolerance to goal attainment, including return to ADL/IADL and functional activities w/ less fall potential/improved safety  - Pt. will have improved balance ability as evidenced by increased SLS time of 10 sec for improved everyday balance and less fall potential  - Pt. will have safe, independent ambulation with safest/least restrictive assistive device for community distances and stairs/curbs with normalized gait pattern  - Pt. will have increased  strength of at least 1/3 muscle grade in affected muscle groups- B hips  - Pt. will have at least 8 point improvement in functional test score- LEFS-as evidenced by self report of improved ability to perform every day activities w/ greater ease and less pain  - Pt will be supervised and a gait belt will be used if needed to assure safety in the clinic during treatment          Patient Stated Goal 1       Start:  02/05/24    Expected End:  05/05/24       Safely perform all daily activities w/o falling              Talya Britton PT

## 2024-03-05 NOTE — PROGRESS NOTES
Physical Therapy                 Therapy Communication Note    Patient Name: Edilson Cantu  MRN: [id number]  Today's Date: 3/5/2024     Discipline: Physical Therapy    Missed Visit Reason:      Missed Time: Cancel

## 2024-03-12 ENCOUNTER — TREATMENT (OUTPATIENT)
Dept: PHYSICAL THERAPY | Facility: CLINIC | Age: 74
End: 2024-03-12
Payer: MEDICARE

## 2024-03-12 DIAGNOSIS — R26.2 DIFFICULTY WALKING: Primary | ICD-10-CM

## 2024-03-12 PROCEDURE — 97530 THERAPEUTIC ACTIVITIES: CPT | Mod: GP | Performed by: PHYSICAL MEDICINE & REHABILITATION

## 2024-03-12 PROCEDURE — 97112 NEUROMUSCULAR REEDUCATION: CPT | Mod: GP | Performed by: PHYSICAL MEDICINE & REHABILITATION

## 2024-03-12 ASSESSMENT — PAIN - FUNCTIONAL ASSESSMENT: PAIN_FUNCTIONAL_ASSESSMENT: 0-10

## 2024-03-12 ASSESSMENT — PAIN SCALES - GENERAL: PAINLEVEL_OUTOF10: 4

## 2024-03-12 NOTE — PROGRESS NOTES
"  Physical Therapy Treatment    Patient Name: Edilson Cantu  MRN: [id number]  Today's Date: 3/12/2024  Time Calculation  Start Time: 1300  Stop Time: 1340  Time Calculation (min): 40 min  PT Therapeutic Procedures Time Entry  Neuromuscular Re-Education Time Entry: 26  Therapeutic Exercise Time Entry: 6  Therapeutic Activity Time Entry: 8,      Current Problem  1. Difficulty walking            Insurance:  Number of Treatments Authorized: 7/30 (Used 12 visits in 2023 this episode)  Certification Period Start Date: 02/05/24  Certification Period End Date: 05/05/24    Subjective   General  Referred By: Dr Landeros  General Comment: Patient reports experiencing a fall this past week. He did not experience any major injuries, but notes that he still feels sore from it. No new complaints otherwise.    Performing HEP?: Yes    Precautions  Precautions  Precautions Comment: high fall risk, post op R TKR  Pain  Pain Assessment: 0-10  Pain Score: 4    Objective   Treatments:    Therapeutic Exercise  Therapeutic Exercise Activity 1: SciFit: MAN L3 X 6MIN    Balance/Neuromuscular Re-Education  Balance/Neuromuscular Re-Education Activity 1: Seated on Leslie Disc: Alt Marches 3x30\"  Balance/Neuromuscular Re-Education Activity 2: Seated on DynaDisc: Alt Knee Ext: 3x30\"  Balance/Neuromuscular Re-Education Activity 3: Seated on Leslie Disc: Trunk Rotations 2x10 w 3.3# med ball hold  Balance/Neuromuscular Re-Education Activity 4: BOSU Lunges: 2x10 each leg w UE support as needed  Balance/Neuromuscular Re-Education Activity 5: Tandem Stance: 2x30\" each side    Therapeutic Activity  Therapeutic Activity 1: Sit to stands with arms crossed: 6x5 from 20.5\" seat      Assessment:  PT Assessment  Assessment Comment: Today's visit focused on progressing activities to improve patient's lower extremity strength, core stability and balance. Patient demonstrated good effort toward today's progressions. No increased pain reported at the conclusion of the " session. Overall response toward today's interventions was great.    Plan:  OP PT Plan  PT Plan: Skilled PT (Continue with current POC. Progress strength, stability and balance as tolerated.)  Onset Date: 10/02/23  Certification Period Start Date: 02/05/24  Certification Period End Date: 05/05/24  Number of Treatments Authorized: 7/30 (Used 12 visits in 2023 this episode)    Goals:  Active       PT Problem       PT Goal 1       Start:  02/05/24    Expected End:  05/05/24       *STG MET*  LTG:   - Progress HEP and OMID within pt tolerance to goal attainment, including return to ADL/IADL and functional activities w/ less fall potential/improved safety  - Pt. will have improved balance ability as evidenced by increased SLS time of 10 sec for improved everyday balance and less fall potential  - Pt. will have safe, independent ambulation with safest/least restrictive assistive device for community distances and stairs/curbs with normalized gait pattern  - Pt. will have increased strength of at least 1/3 muscle grade in affected muscle groups- B hips  - Pt. will have at least 8 point improvement in functional test score- LEFS-as evidenced by self report of improved ability to perform every day activities w/ greater ease and less pain  - Pt will be supervised and a gait belt will be used if needed to assure safety in the clinic during treatment          Patient Stated Goal 1       Start:  02/05/24    Expected End:  05/05/24       Safely perform all daily activities w/o falling              Zachery Foster, PT

## 2024-03-15 DIAGNOSIS — I10 ESSENTIAL (PRIMARY) HYPERTENSION: ICD-10-CM

## 2024-03-18 RX ORDER — HYDROCHLOROTHIAZIDE 25 MG/1
25 TABLET ORAL DAILY
Qty: 30 TABLET | Refills: 0 | Status: SHIPPED | OUTPATIENT
Start: 2024-03-18 | End: 2024-04-16

## 2024-03-19 ENCOUNTER — TREATMENT (OUTPATIENT)
Dept: PHYSICAL THERAPY | Facility: CLINIC | Age: 74
End: 2024-03-19
Payer: MEDICARE

## 2024-03-19 DIAGNOSIS — G89.18 ACUTE POSTOPERATIVE PAIN OF RIGHT KNEE: ICD-10-CM

## 2024-03-19 DIAGNOSIS — M25.561 ACUTE POSTOPERATIVE PAIN OF RIGHT KNEE: ICD-10-CM

## 2024-03-19 DIAGNOSIS — R26.2 DIFFICULTY WALKING: Primary | ICD-10-CM

## 2024-03-19 PROCEDURE — 97110 THERAPEUTIC EXERCISES: CPT | Mod: GP

## 2024-03-19 PROCEDURE — 97112 NEUROMUSCULAR REEDUCATION: CPT | Mod: GP

## 2024-03-19 ASSESSMENT — PAIN SCALES - GENERAL: PAINLEVEL_OUTOF10: 4

## 2024-03-19 ASSESSMENT — PAIN - FUNCTIONAL ASSESSMENT: PAIN_FUNCTIONAL_ASSESSMENT: 0-10

## 2024-03-19 NOTE — PROGRESS NOTES
"  Physical Therapy Treatment    Patient Name: Edilson Cantu  MRN: [id number]  Today's Date: 3/19/2024  Time Calculation  Start Time: 1241  Stop Time: 1326  Time Calculation (min): 45 min  PT Therapeutic Procedures Time Entry  Neuromuscular Re-Education Time Entry: 18  Therapeutic Exercise Time Entry: 27,      Current Problem  1. Difficulty walking        2. Acute postoperative pain of right knee            Insurance:  Number of Treatments Authorized: 8/30 (Used 12 visits in 2023 this episode)  Certification Period Start Date: 02/05/24  Certification Period End Date: 05/05/24    Subjective   General  General Comment: Pt feeling stiff- \"all over\"; otherwise doing ok, managing to not fall- balance exercises helping      Performing HEP?: Yes    Precautions  Precautions  Precautions Comment: high fall risk, post op R TKR  Pain  Pain Assessment: 0-10  Pain Score: 4  Pain Type: Chronic pain    Objective   Impulsivity w/ position changes causes LOB, cued to plan movt, slow down  Able to  tandem 15 sec R post, 8 sec L post before needing to use UE support    Treatments:  Therapeutic Exercise  Therapeutic Exercise Activity 1: SciFit LE S5 Wireless L1 X 6MIN- reciprocal movt pattern   Therapeutic Exercise Activity 2: Seated Hip Flexion: 2 x 10 R/L w 5#  Therapeutic Exercise Activity 3: Seated LAQ R/L 5# 2 x 10  Incline GS stretch x 2'; HT raises x 1'  Sit to stand w/o UE- 2 Airex in seat 2 x 10- trunk guided and cues for form; stand w/ DLB and proper posture during stand phase  YTB loop R/L side step along // bar w/ yellow loop 2 x 1'  R/L hip ext taps w/ YTB loop 2 x 1' ALT  Seated w/ UE on SB- FF x 1', R/L SB x 1'  R/L seated HS stretch x 1' ea    Balance/Neuromuscular Re-Education  Airex narrow MARIAMA stand x 1'- attempt to let go of // bar  Airex DLB- R/L UE lat reaches x 10 ea- touch // bar; R/L cerv rotations x 10  BOSU Lunges: 2x10 each leg w UE support as needed  R/L step over low jamee x 8 passes  Tandem R/L x 1' " ea  Airex march- tap step x 10 ea R/L   Gait practice w/ cane 100 ft x 2 w/ CGA and cues for posture                              Assessment:  PT Assessment  Assessment Comment: Pt noted lessening of joint stiffness as Rx progressed, favors flexed trunk posture and needs cueing during balance tasks to correct to more erect positioning; good strides w/ use of cane- min foot shuffle- turns too quickly and loses balance if not supported/reminded to slow pace- rollator remains safest AD at present time    Plan:  OP PT Plan  PT Plan: Skilled PT  Onset Date: 10/02/23  Certification Period Start Date: 02/05/24  Certification Period End Date: 05/05/24  Number of Treatments Authorized: 8/30 (Used 12 visits in 2023 this episode)  Cont Rx for ROM/flexibility, sx mgt, gait/balance and transfer training, mm re-ed, core stabilization, HEP w/ progression, posture, body mechanics and safety training so that pt can perform daily functional activities w/ less pain, more safely and w/ greater ease   Goals:  Active       PT Problem       PT Goal 1       Start:  02/05/24    Expected End:  05/05/24       *STG MET*  LTG:   - Progress HEP and OMID within pt tolerance to goal attainment, including return to ADL/IADL and functional activities w/ less fall potential/improved safety  - Pt. will have improved balance ability as evidenced by increased SLS time of 10 sec for improved everyday balance and less fall potential  - Pt. will have safe, independent ambulation with safest/least restrictive assistive device for community distances and stairs/curbs with normalized gait pattern  - Pt. will have increased strength of at least 1/3 muscle grade in affected muscle groups- B hips  - Pt. will have at least 8 point improvement in functional test score- LEFS-as evidenced by self report of improved ability to perform every day activities w/ greater ease and less pain  - Pt will be supervised and a gait belt will be used if needed to assure safety in  the clinic during treatment          Patient Stated Goal 1       Start:  02/05/24    Expected End:  05/05/24       Safely perform all daily activities w/o marla Britton PT

## 2024-04-04 ENCOUNTER — TREATMENT (OUTPATIENT)
Dept: PHYSICAL THERAPY | Facility: CLINIC | Age: 74
End: 2024-04-04
Payer: MEDICARE

## 2024-04-04 DIAGNOSIS — R26.2 DIFFICULTY WALKING: Primary | ICD-10-CM

## 2024-04-04 DIAGNOSIS — M25.561 ACUTE POSTOPERATIVE PAIN OF RIGHT KNEE: ICD-10-CM

## 2024-04-04 DIAGNOSIS — G89.18 ACUTE POSTOPERATIVE PAIN OF RIGHT KNEE: ICD-10-CM

## 2024-04-04 PROCEDURE — 97110 THERAPEUTIC EXERCISES: CPT | Mod: GP,CQ

## 2024-04-04 PROCEDURE — 97112 NEUROMUSCULAR REEDUCATION: CPT | Mod: GP,CQ

## 2024-04-04 ASSESSMENT — PAIN - FUNCTIONAL ASSESSMENT: PAIN_FUNCTIONAL_ASSESSMENT: 0-10

## 2024-04-04 ASSESSMENT — PAIN SCALES - GENERAL: PAINLEVEL_OUTOF10: 4

## 2024-04-04 NOTE — PROGRESS NOTES
"  Physical Therapy Treatment    Patient Name: Edilson Cantu  MRN: [id number]  Today's Date: 4/4/2024  Time Calculation  Start Time: 1203  Stop Time: 1241  Time Calculation (min): 38 min  PT Therapeutic Procedures Time Entry  Neuromuscular Re-Education Time Entry: 6  Therapeutic Exercise Time Entry: 32,      Current Problem  1. Difficulty walking        2. Acute postoperative pain of right knee              Insurance:  Payor: [employer]ARE / Plan: [employer] / Product Type: *No Product type* /   Number of Treatments Authorized: 9/30 (Used 12 visits in 2023 this episode)  Certification Period Start Date: 02/05/24  Certification Period End Date: 05/05/24    Subjective   General  Reason for Referral: DIFFICULTY WALKING  Referred By: Dr Landeros  General Comment: PT STATES HE HAS BEEN STIFF/SORE ALL OVER THIS PAST WEEK.  PT SAYS HE FALLS FREQUENTLY, BUT NOT AS MUCH LATELY BECAUSE HE HAS BEEN TRYING TO CONCENTRATE ON BALANCE AND FOOT PLACEMENTS.    Performing HEP?: Yes    Precautions  Precautions  Precautions Comment: high fall risk, post op R TKR  Pain  Pain Assessment: 0-10  Pain Score: 4  Pain Type: Chronic pain    Objective     General Observation  General Observation: PT AMB WITH ROLLATOR       Treatments:    Therapeutic Exercise  Therapeutic Exercise Activity 1: SCIFIT MAN L3 X 6 MIN  Therapeutic Exercise Activity 2: GASTROC STRETCH X 1 MIN  Therapeutic Exercise Activity 3: HEEL RAISES X 1 MIN  Therapeutic Exercise Activity 4: 8\" FOOT TAPS // BAR X 2 MIN  Therapeutic Exercise Activity 5: FOOTWORM ALONG // BAR YELOW LOOP X 2 MIN  Therapeutic Exercise Activity 6: SIT TO STAND FROM CHAIR 1 BLUE PAD 2 X 10  Therapeutic Exercise Activity 7: HAM CURLS 40# X 2 MIN  Therapeutic Exercise Activity 8: LAQ R/L ALT 5# X 2 MIN  Therapeutic Exercise Activity 9: SEATED HIP ADD SQUEEZE HOLD 10\" X 2 MIN    Balance/Neuromuscular Re-Education  Balance/Neuromuscular Re-Education Activity 1: AIREX BEAM BALANCING // BAR X 2 " MIN  Balance/Neuromuscular Re-Education Activity 2: BOSU Lunges: 2x10 each leg w UE support as needed  Balance/Neuromuscular Re-Education Activity 3: STANDING LAT TOE TAPS // BAR X 1 MIN                        OP EDUCATION:  Outpatient Education  Education Comment: CONTINUE WITH CURRENT HEP. CONCENTRATE ON DOING THINGS SLOWLY  AND UNDER CONTROL    Assessment:  PT Assessment  Assessment Comment: PT ANTONIO SESSION FAIRLY WELL.  HE FATIGUES QUICKLY AND NEEDS CUES TO PERFORM EXERCISES SLOWLY AND UNDER CONTROL.  PT HAS DIFFICULTY WITH FOOT PLACEMENTS ESPECIALLY WHEN RETURNING FOOT TO STARTING POSITION.  PT  ALSO NEEDED VC NOT TO SCUFF R FOOT WHEN AMB.    Plan:  OP PT Plan  PT Plan: Skilled PT  Onset Date: 10/02/23  Certification Period Start Date: 02/05/24  Certification Period End Date: 05/05/24  Number of Treatments Authorized: 9/30 (Used 12 visits in 2023 this episode)    Goals:  Active       PT Problem       PT Goal 1       Start:  02/05/24    Expected End:  05/05/24       *STG MET*  LTG:   - Progress HEP and OMID within pt tolerance to goal attainment, including return to ADL/IADL and functional activities w/ less fall potential/improved safety  - Pt. will have improved balance ability as evidenced by increased SLS time of 10 sec for improved everyday balance and less fall potential  - Pt. will have safe, independent ambulation with safest/least restrictive assistive device for community distances and stairs/curbs with normalized gait pattern  - Pt. will have increased strength of at least 1/3 muscle grade in affected muscle groups- B hips  - Pt. will have at least 8 point improvement in functional test score- LEFS-as evidenced by self report of improved ability to perform every day activities w/ greater ease and less pain  - Pt will be supervised and a gait belt will be used if needed to assure safety in the clinic during treatment          Patient Stated Goal 1       Start:  02/05/24    Expected End:  05/05/24        Safely perform all daily activities w/o falling              Giovanny Harley, PTA

## 2024-04-10 ENCOUNTER — TREATMENT (OUTPATIENT)
Dept: PHYSICAL THERAPY | Facility: CLINIC | Age: 74
End: 2024-04-10
Payer: MEDICARE

## 2024-04-10 DIAGNOSIS — R26.2 DIFFICULTY WALKING: Primary | ICD-10-CM

## 2024-04-10 PROCEDURE — 97110 THERAPEUTIC EXERCISES: CPT | Mod: GP,CQ

## 2024-04-10 ASSESSMENT — PAIN - FUNCTIONAL ASSESSMENT: PAIN_FUNCTIONAL_ASSESSMENT: 0-10

## 2024-04-10 ASSESSMENT — PAIN SCALES - GENERAL: PAINLEVEL_OUTOF10: 4

## 2024-04-10 NOTE — PROGRESS NOTES
"  Physical Therapy Treatment    Patient Name: Edilson Cantu  MRN: [id number]  Today's Date: 4/10/2024  Time Calculation  Start Time: 1200  Stop Time: 1231  Time Calculation (min): 31 min  PT Therapeutic Procedures Time Entry  Neuromuscular Re-Education Time Entry: 2  Therapeutic Exercise Time Entry: 29,      Current Problem  1. Difficulty walking              Insurance:  Payor: [employer] MEDICARE / Plan: [employer]ICARE / Product Type: *No Product type* /   Number of Treatments Authorized: 10/30 (Used 12 visits in 2023 this episode)  Certification Period Start Date: 02/05/24  Certification Period End Date: 05/05/24    Subjective   General  Reason for Referral: DIFFICULTY WALKING  Referred By: Dr Landeros  General Comment: PT STATES HE'S HAVING A BAD DAY TODAY. NOT A LOT OF STRENGTH AND HE'S HURTING EVERYWHERE.  HE ALMOST DIDN'T COME IN TODAY.    Performing HEP?: Yes    Precautions  Precautions  Precautions Comment: high fall risk, post op R TKR  Pain  Pain Assessment: 0-10  Pain Score: 4  Pain Type: Chronic pain    Objective   General Observation  General Observation: PT AMB WITH ROLLATOR, NOTED LESS CONTROL WITH HIS AMB TODAY.  NOT ABLE TO WALK A STRAIGHT LINE AS HE NORMALLY CAN.       Treatments:    Therapeutic Exercise  Therapeutic Exercise Activity 1: SCIFIT MAN L2 X 6 MIN  Therapeutic Exercise Activity 2: GASTROC STRETCH X 1 MIN  Therapeutic Exercise Activity 3: HEEL RAISES X 1 MIN  Therapeutic Exercise Activity 4: SIDE STEPS ALONG // BARS X 2 MIN  Therapeutic Exercise Activity 5: HAM CURLS 40# X 2 MIN  Therapeutic Exercise Activity 6: LAQ R/L ALT 5# X 2 MIN  Therapeutic Exercise Activity 7: LEG PRESS  40# X 1 MIN  Therapeutic Exercise Activity 8: HL HIP ADD HOLD 10\" X 2 MIN  Therapeutic Exercise Activity 9: HL HIP ABD R/L ALT GREEN TBAND X 2 MIN    Balance/Neuromuscular Re-Education  Balance/Neuromuscular Re-Education Activity 1: AIREX BEAM BALANCING // BAR X 2 MIN                        OP " EDUCATION:  Outpatient Education  Education Comment: CONTINUE WITH CURRENT HEP. CONCENTRATE ON DOING THINGS SLOWLY  AND UNDER CONTROL    Assessment:  PT Assessment  Assessment Comment: PT ANTONIO EX'S WELL.  HE DID FATIGUE QUICKLY TODAY AND HE CALLED IT QUITS AFTER 30 MINS.    Plan:  OP PT Plan  PT Plan: Skilled PT  Onset Date: 10/02/23  Certification Period Start Date: 02/05/24  Certification Period End Date: 05/05/24  Number of Treatments Authorized: 10/30 (Used 12 visits in 2023 this episode)    Goals:  Active       PT Problem       PT Goal 1       Start:  02/05/24    Expected End:  05/05/24       *STG MET*  LTG:   - Progress HEP and OMID within pt tolerance to goal attainment, including return to ADL/IADL and functional activities w/ less fall potential/improved safety  - Pt. will have improved balance ability as evidenced by increased SLS time of 10 sec for improved everyday balance and less fall potential  - Pt. will have safe, independent ambulation with safest/least restrictive assistive device for community distances and stairs/curbs with normalized gait pattern  - Pt. will have increased strength of at least 1/3 muscle grade in affected muscle groups- B hips  - Pt. will have at least 8 point improvement in functional test score- LEFS-as evidenced by self report of improved ability to perform every day activities w/ greater ease and less pain  - Pt will be supervised and a gait belt will be used if needed to assure safety in the clinic during treatment          Patient Stated Goal 1       Start:  02/05/24    Expected End:  05/05/24       Safely perform all daily activities w/o falling              Giovanny Harley, PTA

## 2024-04-18 ENCOUNTER — APPOINTMENT (OUTPATIENT)
Dept: PHYSICAL THERAPY | Facility: CLINIC | Age: 74
End: 2024-04-18
Payer: MEDICARE

## 2024-05-01 ENCOUNTER — TREATMENT (OUTPATIENT)
Dept: PHYSICAL THERAPY | Facility: CLINIC | Age: 74
End: 2024-05-01
Payer: MEDICARE

## 2024-05-01 DIAGNOSIS — R26.2 DIFFICULTY WALKING: Primary | ICD-10-CM

## 2024-05-01 PROCEDURE — 97140 MANUAL THERAPY 1/> REGIONS: CPT | Mod: GP,CQ

## 2024-05-01 PROCEDURE — 97110 THERAPEUTIC EXERCISES: CPT | Mod: GP,CQ

## 2024-05-01 ASSESSMENT — PAIN SCALES - GENERAL: PAINLEVEL_OUTOF10: 5 - MODERATE PAIN

## 2024-05-01 ASSESSMENT — PAIN - FUNCTIONAL ASSESSMENT: PAIN_FUNCTIONAL_ASSESSMENT: 0-10

## 2024-05-01 NOTE — PROGRESS NOTES
Physical Therapy Treatment    Patient Name: Edilson Cantu  MRN: [id number]  Today's Date: 5/1/2024  Time Calculation  Start Time: 1331  Stop Time: 1409  Time Calculation (min): 38 min  PT Therapeutic Procedures Time Entry  Manual Therapy Time Entry: 10  Therapeutic Exercise Time Entry: 28,      Current Problem  1. Difficulty walking              Insurance:  [facility]: [facility] / Plan: [employer] MEDICARE / Product Type: *No Product type* /   Number of Treatments Authorized: 11/30 (Used 12 visits in 2023 this episode)  Certification Period Start Date: 02/05/24  Certification Period End Date: 05/05/24    Subjective   General  Reason for Referral: DIFFICULTY WALKING  Referred By: Dr Landeros  General Comment: PT STATES ABOUT 10 DAYS AGO HE WOKE UP WITH PAIN IN HIS BACK GOING DOWN HIS R QUAD.  IT COMES AND GOES. HE SEES MD ON 5/3/24 FOR IT. PT WOULD LIKE TO DO STRENGTHENING ON TABLE AND STRETCH TODAY.    Performing HEP?: Yes    Precautions  Precautions  Precautions Comment: high fall risk, post op R TKR  Pain  Pain Assessment: 0-10  Pain Score: 5 - Moderate pain  Pain Type: Chronic pain  Pain Location: Back    Objective   General Observation  General Observation: PT AMB WITH ROLLATOR, NOTED LESS CONTROL WITH HIS AMB TODAY.  NOT ABLE TO WALK A STRAIGHT LINE AS HE NORMALLY CAN.    Treatments:    Therapeutic Exercise  Therapeutic Exercise Activity 1: SCIFIT MAN L2 X 6 MIN  Therapeutic Exercise Activity 2: GASTROC STRETCH X 1 MIN  Therapeutic Exercise Activity 3: SBALL LB ROT X 3 MIN  Therapeutic Exercise Activity 4: SBALL ROLL IN - OUT FOR LUMBAR FLEX X 2 MIN  Therapeutic Exercise Activity 5: REPEATED FLEXION IN SEATED X 2 MIN  Therapeutic Exercise Activity 6: PRONE X 2 MIN  Therapeutic Exercise Activity 7: STANDING REPEATED BACK BENDS X 10         Manual Therapy  Manual Therapy Activity 1: MFR BOTH LEG PULL  Manual Therapy Activity 2: STRETCH HAM, GLUT  Manual Therapy Activity 3: PROM HIP FLEX, ER, IR                    OP EDUCATION:  Outpatient Education  Education Comment: HEP: PRONE FOR BACK EXT, STANDING BACK BENDS    Assessment:  PT Assessment  Assessment Comment: PT HAD GOOD RESPONSE WITH BACK EXT VS FLEXION.  NOTED DECREASED R QUAD RAD SX WITH PRONE POSITION.    Plan:  OP PT Plan  PT Plan: Skilled PT  Onset Date: 10/02/23  Certification Period Start Date: 02/05/24  Certification Period End Date: 05/05/24  Number of Treatments Authorized: 11/30 (Used 12 visits in 2023 this episode)    Goals:  Active       PT Problem       PT Goal 1       Start:  02/05/24    Expected End:  05/05/24       *STG MET*  LTG:   - Progress HEP and OMID within pt tolerance to goal attainment, including return to ADL/IADL and functional activities w/ less fall potential/improved safety  - Pt. will have improved balance ability as evidenced by increased SLS time of 10 sec for improved everyday balance and less fall potential  - Pt. will have safe, independent ambulation with safest/least restrictive assistive device for community distances and stairs/curbs with normalized gait pattern  - Pt. will have increased strength of at least 1/3 muscle grade in affected muscle groups- B hips  - Pt. will have at least 8 point improvement in functional test score- LEFS-as evidenced by self report of improved ability to perform every day activities w/ greater ease and less pain  - Pt will be supervised and a gait belt will be used if needed to assure safety in the clinic during treatment          Patient Stated Goal 1       Start:  02/05/24    Expected End:  05/05/24       Safely perform all daily activities w/o falling              Giovanny Harley, PTA

## 2024-05-03 ENCOUNTER — OFFICE VISIT (OUTPATIENT)
Dept: PRIMARY CARE | Facility: CLINIC | Age: 74
End: 2024-05-03
Payer: MEDICARE

## 2024-05-03 VITALS — DIASTOLIC BLOOD PRESSURE: 57 MMHG | OXYGEN SATURATION: 94 % | SYSTOLIC BLOOD PRESSURE: 96 MMHG | HEART RATE: 55 BPM

## 2024-05-03 DIAGNOSIS — M54.31 SCIATICA OF RIGHT SIDE: Primary | ICD-10-CM

## 2024-05-03 PROCEDURE — 1123F ACP DISCUSS/DSCN MKR DOCD: CPT | Performed by: INTERNAL MEDICINE

## 2024-05-03 PROCEDURE — 99213 OFFICE O/P EST LOW 20 MIN: CPT | Performed by: INTERNAL MEDICINE

## 2024-05-03 PROCEDURE — 3074F SYST BP LT 130 MM HG: CPT | Performed by: INTERNAL MEDICINE

## 2024-05-03 PROCEDURE — 1036F TOBACCO NON-USER: CPT | Performed by: INTERNAL MEDICINE

## 2024-05-03 PROCEDURE — 1157F ADVNC CARE PLAN IN RCRD: CPT | Performed by: INTERNAL MEDICINE

## 2024-05-03 PROCEDURE — 1159F MED LIST DOCD IN RCRD: CPT | Performed by: INTERNAL MEDICINE

## 2024-05-03 PROCEDURE — 1160F RVW MEDS BY RX/DR IN RCRD: CPT | Performed by: INTERNAL MEDICINE

## 2024-05-03 PROCEDURE — 3078F DIAST BP <80 MM HG: CPT | Performed by: INTERNAL MEDICINE

## 2024-05-03 RX ORDER — GABAPENTIN 100 MG/1
100 CAPSULE ORAL 3 TIMES DAILY
Qty: 90 CAPSULE | Refills: 0 | Status: SHIPPED | OUTPATIENT
Start: 2024-05-03

## 2024-05-03 ASSESSMENT — ENCOUNTER SYMPTOMS
DIAPHORESIS: 0
SPEECH DIFFICULTY: 0
HYPERACTIVE: 0
ACTIVITY CHANGE: 0
CHEST TIGHTNESS: 0
NERVOUS/ANXIOUS: 0
SHORTNESS OF BREATH: 0
HEADACHES: 0
EYE DISCHARGE: 0
ARTHRALGIAS: 0
COUGH: 0
NAUSEA: 0
FACIAL ASYMMETRY: 0
CHOKING: 0
EYE ITCHING: 0
AGITATION: 0
DIFFICULTY URINATING: 0
FEVER: 0
APPETITE CHANGE: 0
BACK PAIN: 1
FATIGUE: 1
NUMBNESS: 0
POLYPHAGIA: 0
EYE PAIN: 0
BLOOD IN STOOL: 0
DYSPHORIC MOOD: 0
POLYDIPSIA: 0
CONFUSION: 0
EYE REDNESS: 0
VOMITING: 0
ABDOMINAL PAIN: 0
LIGHT-HEADEDNESS: 0
FACIAL SWELLING: 0
MYALGIAS: 1
JOINT SWELLING: 0
ABDOMINAL DISTENTION: 0

## 2024-05-03 NOTE — PATIENT INSTRUCTIONS

## 2024-05-03 NOTE — PROGRESS NOTES
Patient was ideSubjective   Reason for Visit: Edilson Cantu is an 73 y.o. male here for a Medicare Wellness visit.         Neuropathy          Patient is a 72-year-old male with past medical history of postlaminectomy syndrome Parkinson disease, hypertension, dyslipidemia who presents for sick visit.    Patient states that over the last several weeks has been experiencing sharp pain beginning in the buttocks region radiating down the back of the leg which she believes to be sciatica.  He states that occurs intermittently throughout the day but when it occurs it is quite bothersome affects his ability to ambulate.  He is interested in starting medication to help blunt the pain.  Of note he does have Parkinson syndrome for which he follows with neurology.  He did fall yesterday on his head but did not go to the emergency room.  No changes in vision no headache.  He states that his head hit a chair but he was able to catch himself.  There is an abrasion on the left forehead with no loose skin    Patient lives at home with his wife.  Patient is capable of doing his activities of daily living but relies on his wife due to his Parkinson disease.      Review of Systems   Constitutional:  Positive for fatigue. Negative for activity change, appetite change, diaphoresis and fever.   HENT:  Negative for dental problem, drooling, ear pain, facial swelling, hearing loss and nosebleeds.    Eyes:  Negative for pain, discharge, redness and itching.   Respiratory:  Negative for cough, choking, chest tightness and shortness of breath.    Gastrointestinal:  Negative for abdominal distention, abdominal pain, blood in stool, nausea and vomiting.   Endocrine: Negative for cold intolerance, heat intolerance, polydipsia and polyphagia.   Genitourinary:  Negative for difficulty urinating.   Musculoskeletal:  Positive for back pain, gait problem and myalgias. Negative for arthralgias and joint swelling.   Allergic/Immunologic: Negative for  environmental allergies and food allergies.   Neurological:  Negative for facial asymmetry, speech difficulty, light-headedness, numbness and headaches.   Psychiatric/Behavioral:  Negative for agitation, confusion and dysphoric mood. The patient is not nervous/anxious and is not hyperactive.        Objective   Vitals:  BP 96/57   Pulse 55   SpO2 94%       Physical Exam  HENT:      Right Ear: There is impacted cerumen.   Musculoskeletal:      Right lower leg: Edema present.      Left lower leg: Edema present.      Comments: Left foot drop.  4/5 strength in the bilateral lower extremities         Assessment/Plan   Problem List Items Addressed This Visit    None  Visit Diagnoses       Sciatica of right side    -  Primary    Relevant Medications    gabapentin (Neurontin) 100 mg capsule    Other Relevant Orders    Follow Up In [facility] - PCP - Medicare Annual          Symptoms are concerning for sciatica.  Will start gabapentin for symptomatic relief.  Advise taking 100 mg 3 times a day as needed and may titrate to 200 mg 3 times a day as needed in 1 week if tolerated.  Follow-up if no improvement in 1 month.  Explained that the medication can cause some sedation and to monitor closely.    With regards to the fall he has no vision changes or evidence of blood behind the tympanic membrane.  At this point is a little bit late for any kind of suturing however it does appear like the abrasion will heal on its own.  Advised to keep it covered for the next 3 days.   ntified as a fall risk. Risk prevention instructions provided.

## 2024-05-08 ENCOUNTER — APPOINTMENT (OUTPATIENT)
Dept: PHYSICAL THERAPY | Facility: CLINIC | Age: 74
End: 2024-05-08
Payer: MEDICARE

## 2024-05-08 ENCOUNTER — OFFICE VISIT (OUTPATIENT)
Dept: NEUROLOGY | Facility: CLINIC | Age: 74
End: 2024-05-08
Payer: MEDICARE

## 2024-05-08 VITALS
HEART RATE: 97 BPM | RESPIRATION RATE: 14 BRPM | DIASTOLIC BLOOD PRESSURE: 71 MMHG | SYSTOLIC BLOOD PRESSURE: 110 MMHG | WEIGHT: 152 LBS | BODY MASS INDEX: 22.45 KG/M2

## 2024-05-08 DIAGNOSIS — R29.898 WEAKNESS OF LEFT FOOT: ICD-10-CM

## 2024-05-08 DIAGNOSIS — R26.2 DIFFICULTY WALKING: Primary | ICD-10-CM

## 2024-05-08 DIAGNOSIS — M48.07 LUMBOSACRAL SPINAL STENOSIS: ICD-10-CM

## 2024-05-08 DIAGNOSIS — M96.1 POSTLAMINECTOMY SYNDROME, LUMBAR REGION: ICD-10-CM

## 2024-05-08 DIAGNOSIS — M54.41 ACUTE RIGHT-SIDED LOW BACK PAIN WITH RIGHT-SIDED SCIATICA: Primary | ICD-10-CM

## 2024-05-08 DIAGNOSIS — G20.A1 PARKINSON'S DISEASE WITHOUT DYSKINESIA OR FLUCTUATING MANIFESTATIONS (MULTI): ICD-10-CM

## 2024-05-08 DIAGNOSIS — I10 ESSENTIAL (PRIMARY) HYPERTENSION: ICD-10-CM

## 2024-05-08 PROCEDURE — 1036F TOBACCO NON-USER: CPT | Performed by: NURSE PRACTITIONER

## 2024-05-08 PROCEDURE — 1160F RVW MEDS BY RX/DR IN RCRD: CPT | Performed by: NURSE PRACTITIONER

## 2024-05-08 PROCEDURE — 3078F DIAST BP <80 MM HG: CPT | Performed by: NURSE PRACTITIONER

## 2024-05-08 PROCEDURE — 1123F ACP DISCUSS/DSCN MKR DOCD: CPT | Performed by: NURSE PRACTITIONER

## 2024-05-08 PROCEDURE — 1157F ADVNC CARE PLAN IN RCRD: CPT | Performed by: NURSE PRACTITIONER

## 2024-05-08 PROCEDURE — 99215 OFFICE O/P EST HI 40 MIN: CPT | Performed by: NURSE PRACTITIONER

## 2024-05-08 PROCEDURE — 1159F MED LIST DOCD IN RCRD: CPT | Performed by: NURSE PRACTITIONER

## 2024-05-08 PROCEDURE — 3074F SYST BP LT 130 MM HG: CPT | Performed by: NURSE PRACTITIONER

## 2024-05-08 ASSESSMENT — UNIFIED PARKINSONS DISEASE RATING SCALE (UPDRS)
KINETIC_TREMOR_RIGHTHAND: 0
RIGIDITY_LLE: 1
CONSTANCY_TREMOR_ATREST: 0
POSTURAL_STABILITY: 3
FACIAL_EXPRESSION: 2
TOETAPPING_RIGHT: 3
LEG_AGILITY_RIGHT: 4
AMPLITUDE_LIP_JAW: 0
RIGIDITY_NECK: 2
POSTURAL_TREMOR_RIGHTHAND: 1
FINGER_TAPPING_RIGHT: 1
AMPLITUDE_RUE: 0
PARKINSONS_MEDS: YES
RIGIDITY_LUE: 3
PRONATION_SUPINATION_RIGHT: 3
TOTAL_SCORE: 52
SPEECH: 2
RIGIDITY_RUE: 2
SPONTANEITY_OF_MOVEMENT: 3
KINETIC_TREMOR_LEFTHAND: 0
AMPLITUDE_LUE: 0
POSTURE: 3
LEVODOPA: YES
CLINICAL_STATE: ON
AMPLITUDE_LLE: 0
AMPLITUDE_RLE: 0
HANDMOVEMENTS_RIGHT: 1
TOETAPPING_LEFT: 3
FINGER_TAPPING_LEFT: 1
DYSKINESIAS_PRESENT: NO
FREEZING_GAIT: 0
LEG_AGILITY_LEFT: 3
POSTURAL_TREMOR_LEFTHAND: 1
GAIT: 3
RIGIDITY_RLE: 0
CHAIR_RISING_SCALE: 2
MINUTES_SINCE_LEVODOPA: 70
PRONATION_SUPINATION_LEFT: 3

## 2024-05-08 ASSESSMENT — ENCOUNTER SYMPTOMS
LOSS OF SENSATION IN FEET: 1
OCCASIONAL FEELINGS OF UNSTEADINESS: 1
DEPRESSION: 0

## 2024-05-08 ASSESSMENT — PATIENT HEALTH QUESTIONNAIRE - PHQ9
1. LITTLE INTEREST OR PLEASURE IN DOING THINGS: NOT AT ALL
2. FEELING DOWN, DEPRESSED OR HOPELESS: NOT AT ALL
SUM OF ALL RESPONSES TO PHQ9 QUESTIONS 1 AND 2: 0

## 2024-05-08 NOTE — PROGRESS NOTES
"Subjective     Edilson Cantu is a 73 y.o. year old male who presents with Gait Problem and Parkinson's Disease, here for follow up visit.    HPI    Last visit: in [facility] with Dr. Landeros.    Sooner apt for sudden issues with balance and strength.  Falling multiple times a day, overnight 3 weeks ago. He was falling before 1-2x/day but not almost every time he gets up to move. He needs his wife to  him-he is very slow, has to put a lot of thought into it.     Last few weeks he feels sx are progressing rapidly. 3-4 weeks ago he lost ability to control L great toe \"it flops around\" and 3 weeks ago sciatica, R hip, R back, R thigh. He has seen his PCP a few days ago, given gabapentin which helped a little with the pain. No power in RLE--could do 30 leg lifts with 5lb weight he says d/t weakness not pain.  Balance is an issue. 1 week ago hit his head but sx were prior to this and denies vision changes, headaches.     Big decline in the past year, balance, stiff and slow constantly. Oct 2023 he could do laps around his house but has not been able to since. Though knee has helped the pain. Gradual decline since dx.    Denies ever having issues with LE before. Denies injury with any of the falls     No new meds except gabapentin added after above issues, denies new illnesses.    No bowel or bladder IC.   Paresthesias in his hands sometimes and feet feel numb at times but not increased (had this in the past).    Lumbar laminectomy 2018-L5?    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 Does better when he has something to do. He will use an electric scooter.      MOTOR SYMPTOMS      +/-                            Comments  Motor sx overall  Overall worsening as above   Tremor -    Rigidity +    Bradykinesia + Worsening    Balance/gait + Worsening   FOG + Worsening   Falls + Multiple times a day as above   PT + Currently   Exercise - Difficulty getting " motivated     NON MOTOR SYMPTOMS       +/-                               Comments  Orthostatic lightheadedness  -    Cognitive + Sometimes confused out of the blue momentarily but no obvious changes recently   Insomnia -    RBD + Physical w/ wife prior to being on Sinemet  Does kick and yell/flail 2x week  He feels well rested    Depression -    Anxiety -    Hypophonia +    Dysphagia + Mild and no coughing/choking eat or drinking   Constipation + BM q3days  Laxatives   Urinary dysfunction -         Social  Lives with: wife  Help with ADLs: independent until RLE issues as above (wife works part time)         [facility] Meds  Med Dose Time   Carbidopa-levodopa 25/100mg 2 tabs 4 times a day Q3hrs 830am-530pm             Latency 20mins    Wearing off None, does not notice if he misses a dose    Side effects     Dyskinesia None    Hallucinations None    Other None        Patient Health Questionnaire-2 Score: 0            Current Outpatient Medications:     amLODIPine (Norvasc) 5 mg tablet, TAKE 1 TABLET BY MOUTH EVERY DAY FOR BLOOD PRESSURE, Disp: 90 tablet, Rfl: 1    bisacodyl (Dulcolax) 5 mg EC tablet, -, Disp: , Rfl:     carbidopa-levodopa (Sinemet)  mg tablet, Take 2 tablets by mouth. at 8:30am, 11:30am, 2:30pm, and 5:30pm., Disp: , Rfl:     diclofenac sodium 1 % kit, Apply 1 Application topically 4 times a day as needed (to areas of pain)., Disp: , Rfl:     gabapentin (Neurontin) 100 mg capsule, Take 1 capsule (100 mg) by mouth 3 times a day., Disp: 90 capsule, Rfl: 0    hydroCHLOROthiazide (HYDRODiuril) 25 mg tablet, TAKE 1 TABLET (25 MG) BY MOUTH ONCE DAILY. ----DUE FOR APPT, Disp: 15 tablet, Rfl: 0    ibuprofen 200 mg tablet, Take by mouth., Disp: , Rfl:        Objective   Vitals:    05/08/24 1316 05/08/24 1318   BP: 121/70 110/71   BP Location: Right arm Right arm   Patient Position: Sitting Standing   BP Cuff Size: Adult Adult   Pulse: 81 97   Resp: 14    Weight: 68.9 kg (152 lb)                   Physical Exam    MDS UPDRS 1st Score: Motor Examination  Is the patient on medication for treating the symptoms of Parkinson's Disease?: Yes  Patients receiving medication for treating the symptoms of Parkinson's Disease, ana the patient's clinical state.: On  Is the patient on Levodopa?: Yes  Minutes since last Levodopa dose: 70  Speech: 2  Facial Expression: 2  Rigidty Neck: 2  Rigidty RUE: 2  Rigidity - LUE: 3  Rigidity RLE: 0  Rigidity LLE: 1  Finger Tapping Right Hand: 1  Finger Tapping Left Hand: 1  Hand Movements- Right Hand: 1  Hand Movements- Left Hand: 2  Pronatiaon-Supination Movments - Right Hand: 3  Pronatiaon-Supination Movments Left Hand: 3  Toe Tapping Right Foot: 3  Toe Tapping - Left Foot: 3  Leg Agility - Right Le  Leg Agility - Left leg: 3  Arising from Chair: 2  Gait: 3  Freezing of Gait: 0  Postural Stability: 3 (inferred)  Posture: 3  Global Spontanteity of Movment ( Body Bradykinesia): 3  Postural Tremor - Right Hand: 1  Postural Tremor - Left hand: 1  Kinetic Tremor - Right hand: 0  Kinetic Tremor - Left hand: 0  Rest Tremor Amplitude - RUE: 0  Rest Tremor Amplitude - LUE: 0  Rest Tremor Amplitude - RLE: 0  Rest Tremor Amplitude - LLE: 0  Rest Tremor Amplitude - Lip/Jaw: 0  Constancy of Rest Tremor: 0  MDS UPDRS Total Score: 52  Were dyskinesias (chorea or dystonia) present during examination?: No    General:  Well developed well-nourished male in no acute distress with good grooming.  Mental Status:  The patient is awake, alert and oriented to time, place and person.  Grossly normal fund of knowledge noted.  Recent and remote memory intact.  Attention span and concentration is within normal limits.   Language:  Speech is hypophonic.   Cranial Nerves:  Pupils are equal, round, reactive to light. Extraocular muscles are intact. Visual fields are intact by confrontation.  Cranial nerve 5 and 7 are symmetric bilaterally.  Hearing is intact to voice. Palate elevates symmetrically.   Tongue is midline.  Shoulder shrug is intact.   Motor Exam:  Shows symmetric strength in the upper and lower extremities bilaterally both proximally and distally with normal tone and bulk (except L foot eversion is weak (3/5) and L great toe has a flicker of movement but otherwise is flaccid/drops down when holding his leg out) all other muscle strength 5/5 throughout. Initially R hip flexor-2, leg extension-4  but reassessed and gave 5/5 strength.   L foot dystonia inversion.  Sensory Exam: Is intact bilaterally to pin prick, light touch, vibration, temperature in all four extremities. and bilateral great toe proprioception is intact.  Deep Tendon Reflexes:  Are intact throughout BUE-2+ patellar, 1+ triceps, brachioradialis BUE, and BLE-absent   neutral Babinski  Cerebellar Exam:  Shows no dysmetria or truncal ataxia.  Gait and Station: Slow, short, shuffling steps, very narrow base w/ tendency to cross legs, roller walker.            Assessment/Plan   Mr. Edilson Cantu is a 73 y.o. M  with PMH nutritional neuropathy, lumbar stenosis, OA of the knees and PD (dx 2020)     12/15/2020: He was taking sinemet at 8 AM, 4 PM, and 9 PM. He did not notice any beneift nor side effects. He did some PT then stopped because of the second wave of COVID. He reports new severe right quad pain and is planning to talk to Dr. Reich about it. I will increase his sinemet dose gradually to 1.5 tabs four times daily and adjust timing.      03/08/2021: No benefit or side effect from the higher dose of sinemet. He wants to go back to 1 tablet three times daily, which is reasonable. He hasn't been able to exercise because of his quad pain. He is getting back steroid injections for it.      06/14/2021: reports some benefit from sinemet on walking without side effects. First in-person exam shows a UPDRS of 28 two hours after sinemet. Will increase the dose.      02/10/2022: can't tell if the higher dose of sinemet helped. No side effects.  balance is worsening but responds to PT. UPDRS improved five points on today's on-exam compared to last time. will continue the same sinemet dose and add miralax for worsening constipation.      9/8/2022: UPDRS 33 from 23 but he was off and just took his dose of sinemet. After 30 minutes into dose his bradykinesia was improved on repeat exam. Overall seems to be somewhat responsive to sinemet, and it is possible he does not report significant improvement due to his confounding comorbidities of lumbar stenosis and OA. Will try to raise sinemet further and assess response. No red flags for atypical parkinsonism at this time     06/08/2023: The latest increase in dose did help his symptoms to some degree especially feeling less jittery but it did not help his balance and he continues with very frequent falls and near falls. found PT helpful but can't participate fully because of his bad right knee. He will have knee replacement soon and was wondering if PD can have implications to his knee surgery. I discussed the possibility of transient worsening of PD symptoms around surgery time and the importance of not changing sinemet dose around the time of surgery.      1/18/2024: Largely stable. Falls still a problem, mostly backwards. Had knee replacement without issues and his pain and walking improved some. Dream enactment is getting somewhat better. Has mild nighttime confusion if he wakes up from sleep. Doing exercise. Will keep sinemet dose the same.     5/8/2024: Presents today w/ sooner apt for c/o LBP with R sided weakness and radiation to anterior thigh, L great toe weakness, increase in falls (multiple falls a day), feels overall decline in PD SX. He denies saddle anesthesia/paresthesia, has had numbness remotely in his feet but not currently an issue, denies a fall causing the pain. On exam he has L foot eversion weakness and L great toe weakness, no weakness in RLE. UPDRS was 33 last visit and today 70mins after  meds is 52. Will obtain STAT imaging for possible lumbar radiculopathy with LLE weakness/back pain, advised to f/u with PCP for concern for suddenly worsening parkinsonism (usually d/t another cause), after that would increase Sinemet as he appears under treated and though Sinemet does not directly help balance may help rigidity and bradykinesia directly affecting his balance. Also check for OH.    PLAN  STAT MRI lumbar spine  Check labs/UA with PCP r/o another cause of suddenly worsening PD  Check OH BPs to r/o OH as a cause to falls though not symptomatic  One above done, consider increasing levodopa (he is hesitant d/t risk for SE) but appears under treated  Advised against week long visit to [place] but he and wife would like to do, note he will use a motor scooter at all times      Diagnoses and all orders for this visit:  Acute right-sided low back pain with right-sided sciatica  -     MR lumbar spine wo IV contrast; Future  Weakness of left foot  -     MR lumbar spine wo IV contrast; Future      #Continue Carbidopa-levodopa unchanged    -We can consider increasing once blood pressures reviewed and MRI lumbar spine reviewed    #STAT MRI lumbar spine ordered for L leg weakness, R back pain    Apt tomorrow at 2pm-basement at [address]    #I would recommend routine labs and UA for sudden worsening of parkinsonism just to rule out other causes (especially prior to travel). I understand you still would like to travel but have to be cautious d/t multiple falls    #Blood Pressure Monitoring: should monitor d/t falls  Sitting and standing blood pressure: Please take sitting blood pressure and heart rate and after standing up for 3 minutes while you are still standing take blood pressure and heart rate again  Do this twice a day (mid morning and mid afternoon) for the next 3 days and send to me Friday am    then send me the readings via [employer] or fax ([phone])    #Hold PT until I get lumbar spine results--next apt  is 5/22    #Follow up as scheduled with Dr. Leti ZIMMERMAN, SAYRA Moody, personally performed  a medically appropriate exam, discussion of care/treatment options taking a total time of 48minutes for today's visit.    Cuong Moody NP-C  Adult/Gerontological Nurse Practitioner   [facility], Department of Neurology  [facility] [place]  [facility]  [address] [id number]  Phone: [phone]  Fax: [phone]

## 2024-05-08 NOTE — Clinical Note
Saji Weinstein, patient seen in office today. He has sudden worsening of parkinsonism and LLE weakness on exam, not sudden enough to advise ER (was 3 weeks ago) but would recommend labs and UA prior to him leaving the country next week, advised him to f/u with you, I am getting STAT MRI lumbar spine which will be tomorrow at 2pm. Thank you, Cuong

## 2024-05-08 NOTE — PATIENT INSTRUCTIONS
#Continue Carbidopa-levodopa unchanged    -We can consider increasing once blood pressures reviewed and MRI lumbar spine reviewed    #STAT MRI lumbar spine ordered for L leg weakness, R back pain    Apt tomorrow at 2pm-basement at [address]    #I would recommend routine labs and UA for sudden worsening of parkinsonism just to rule out other causes (especially prior to travel). I understand you still would like to travel but have to be cautious d/t multiple falls    #Blood Pressure Monitoring: should monitor d/t falls  Sitting and standing blood pressure: Please take sitting blood pressure and heart rate and after standing up for 3 minutes while you are still standing take blood pressure and heart rate again  Do this twice a day (mid morning and mid afternoon) for the next 3 days and send to me Friday am    then send me the readings via [employer] or fax ([phone])    #Hold PT until I get lumbar spine results    #Follow up as scheduled with Dr. Ltei Moody, NP-C  Adult/Gerontological Nurse Practitioner   [facility], Department of Neurology  [facility] [place]  [facility]  [address] [place] [address]  Phone: [phone]  Fax: [phone]

## 2024-05-09 ENCOUNTER — LAB (OUTPATIENT)
Dept: LAB | Facility: LAB | Age: 74
End: 2024-05-09
Payer: MEDICARE

## 2024-05-09 ENCOUNTER — TELEPHONE (OUTPATIENT)
Dept: NEUROLOGY | Facility: CLINIC | Age: 74
End: 2024-05-09

## 2024-05-09 ENCOUNTER — HOSPITAL ENCOUNTER (OUTPATIENT)
Dept: RADIOLOGY | Facility: CLINIC | Age: 74
Discharge: HOME | End: 2024-05-09
Payer: MEDICARE

## 2024-05-09 DIAGNOSIS — M54.41 ACUTE RIGHT-SIDED LOW BACK PAIN WITH RIGHT-SIDED SCIATICA: ICD-10-CM

## 2024-05-09 DIAGNOSIS — G20.A1 PARKINSON'S DISEASE WITHOUT DYSKINESIA OR FLUCTUATING MANIFESTATIONS (MULTI): ICD-10-CM

## 2024-05-09 DIAGNOSIS — M96.1 POSTLAMINECTOMY SYNDROME, LUMBAR REGION: ICD-10-CM

## 2024-05-09 DIAGNOSIS — M48.07 LUMBOSACRAL SPINAL STENOSIS: ICD-10-CM

## 2024-05-09 DIAGNOSIS — R26.2 DIFFICULTY WALKING: ICD-10-CM

## 2024-05-09 DIAGNOSIS — R29.898 WEAKNESS OF LEFT FOOT: ICD-10-CM

## 2024-05-09 LAB
ALBUMIN SERPL BCP-MCNC: 4 G/DL (ref 3.4–5)
ALP SERPL-CCNC: 136 U/L (ref 33–136)
ALT SERPL W P-5'-P-CCNC: 5 U/L (ref 10–52)
ANION GAP SERPL CALC-SCNC: 16 MMOL/L (ref 10–20)
APPEARANCE UR: CLEAR
AST SERPL W P-5'-P-CCNC: 17 U/L (ref 9–39)
BILIRUB SERPL-MCNC: 1 MG/DL (ref 0–1.2)
BILIRUB UR STRIP.AUTO-MCNC: NEGATIVE MG/DL
BUN SERPL-MCNC: 18 MG/DL (ref 6–23)
CALCIUM SERPL-MCNC: 9.3 MG/DL (ref 8.6–10.6)
CHLORIDE SERPL-SCNC: 100 MMOL/L (ref 98–107)
CO2 SERPL-SCNC: 29 MMOL/L (ref 21–32)
COLOR UR: YELLOW
CREAT SERPL-MCNC: 0.85 MG/DL (ref 0.5–1.3)
EGFRCR SERPLBLD CKD-EPI 2021: >90 ML/MIN/1.73M*2
ERYTHROCYTE [DISTWIDTH] IN BLOOD BY AUTOMATED COUNT: 13.4 % (ref 11.5–14.5)
GLUCOSE SERPL-MCNC: 97 MG/DL (ref 74–99)
GLUCOSE UR STRIP.AUTO-MCNC: NORMAL MG/DL
HCT VFR BLD AUTO: 38.4 % (ref 41–52)
HGB BLD-MCNC: 13.2 G/DL (ref 13.5–17.5)
KETONES UR STRIP.AUTO-MCNC: NEGATIVE MG/DL
LEUKOCYTE ESTERASE UR QL STRIP.AUTO: NEGATIVE
MCH RBC QN AUTO: 32 PG (ref 26–34)
MCHC RBC AUTO-ENTMCNC: 34.4 G/DL (ref 32–36)
MCV RBC AUTO: 93 FL (ref 80–100)
MUCOUS THREADS #/AREA URNS AUTO: NORMAL /LPF
NITRITE UR QL STRIP.AUTO: NEGATIVE
NRBC BLD-RTO: 0 /100 WBCS (ref 0–0)
PH UR STRIP.AUTO: 6.5 [PH]
PLATELET # BLD AUTO: 236 X10*3/UL (ref 150–450)
POTASSIUM SERPL-SCNC: 3.6 MMOL/L (ref 3.5–5.3)
PROT SERPL-MCNC: 6.3 G/DL (ref 6.4–8.2)
PROT UR STRIP.AUTO-MCNC: ABNORMAL MG/DL
RBC # BLD AUTO: 4.13 X10*6/UL (ref 4.5–5.9)
RBC # UR STRIP.AUTO: NEGATIVE /UL
RBC #/AREA URNS AUTO: NORMAL /HPF
SODIUM SERPL-SCNC: 141 MMOL/L (ref 136–145)
SP GR UR STRIP.AUTO: 1.02
UROBILINOGEN UR STRIP.AUTO-MCNC: ABNORMAL MG/DL
WBC # BLD AUTO: 5.4 X10*3/UL (ref 4.4–11.3)
WBC #/AREA URNS AUTO: NORMAL /HPF

## 2024-05-09 PROCEDURE — 80053 COMPREHEN METABOLIC PANEL: CPT

## 2024-05-09 PROCEDURE — 36415 COLL VENOUS BLD VENIPUNCTURE: CPT

## 2024-05-09 PROCEDURE — 72148 MRI LUMBAR SPINE W/O DYE: CPT | Performed by: RADIOLOGY

## 2024-05-09 PROCEDURE — 85027 COMPLETE CBC AUTOMATED: CPT

## 2024-05-09 PROCEDURE — 72148 MRI LUMBAR SPINE W/O DYE: CPT

## 2024-05-09 PROCEDURE — 81001 URINALYSIS AUTO W/SCOPE: CPT

## 2024-05-09 RX ORDER — HYDROCHLOROTHIAZIDE 25 MG/1
25 TABLET ORAL DAILY
Qty: 90 TABLET | Refills: 0 | Status: SHIPPED | OUTPATIENT
Start: 2024-05-09

## 2024-05-15 ENCOUNTER — APPOINTMENT (OUTPATIENT)
Dept: PHYSICAL THERAPY | Facility: CLINIC | Age: 74
End: 2024-05-15
Payer: MEDICARE

## 2024-05-22 ENCOUNTER — TREATMENT (OUTPATIENT)
Dept: PHYSICAL THERAPY | Facility: CLINIC | Age: 74
End: 2024-05-22
Payer: MEDICARE

## 2024-05-22 DIAGNOSIS — R26.2 DIFFICULTY WALKING: Primary | ICD-10-CM

## 2024-05-22 PROCEDURE — 97110 THERAPEUTIC EXERCISES: CPT | Mod: GP | Performed by: PHYSICAL MEDICINE & REHABILITATION

## 2024-05-22 ASSESSMENT — PAIN - FUNCTIONAL ASSESSMENT: PAIN_FUNCTIONAL_ASSESSMENT: 0-10

## 2024-05-22 ASSESSMENT — PAIN SCALES - GENERAL: PAINLEVEL_OUTOF10: 5 - MODERATE PAIN

## 2024-05-22 NOTE — PROGRESS NOTES
Physical Therapy Treatment    Patient Name: Edilson Cantu  MRN: [id number]  Today's Date: 5/22/2024  Time Calculation  Start Time: 1324  Stop Time: 1407  Time Calculation (min): 43 min  PT Therapeutic Procedures Time Entry  Therapeutic Exercise Time Entry: 25,      Current Problem  1. Difficulty walking            Insurance:  Number of Treatments Authorized: 12/30 (Used 12 visits in 2023 this episode)  Certification Period Start Date: 05/22/24  Certification Period End Date: 08/20/24    Subjective   General  Reason for Referral: DIFFICULTY WALKING  Referred By: Dr Landeros  General Comment: Patient returns after going on a trip out of the country. He notes that he continues to have some pain in his low back, but it is no worse than what it was a few weeks ago. He no longer feels pain in his right leg, but notes that he continues to have weakness in it, which makes it very difficult for him to walk. He denies any changes in bowel/bladder function, and notes that his weakness has not been progressively worsening since its onset.    Precautions  Precautions  Precautions Comment: high fall risk, post op R TKR  Pain  Pain Assessment: 0-10  Pain Score: 5 - Moderate pain  Pain Type: Chronic pain  Pain Location: Back    Objective   Observation  Observation:: Markedly antalgic gait observed on RLE. Shortened step length due to inability to swing RLE forward.  Lumbar Myotomes  R Hip Flex : 2/5  L Hip Flex (L2): (5/5): 4+/5  R Knee Ext (L3): (5/5): 4/5  L Knee Ext (L3): (5/5) : 4/5  R Ankle DF (L4): (5/5): 4/5  L Ankle DF (L4): (5/5): 3+/5  R Great Toe Ext (L5): (5/5) : 4/5  L Great Toe Ext (L5): (5/5): 4/5  R Ankle EV (S1): (5/5): 4/5  L Ankle EV (S1): (5/5): 4/5  Dermatomes  Dermatomes WFL: yes    Treatments:    Therapeutic Exercise  Therapeutic Exercise Activity 1: ARCENIO: 2x10    OP EDUCATION:  Outpatient Education  Individual(s) Educated: Patient, Spouse  Education Comment: Educated patient on importance of gentle movement  for pain management, as well as monitoring for red flag symptoms. Patient was instructed to go to the ER if his symptoms worsen at all.    Assessment:  PT Assessment  Assessment Comment: Patient returns after traveling out of the country. A few weeks ago, he had experienced an acute episode of low back pain with radicular symptoms down his RLE. He continues to experience marked weakness in his RLE. Lower extremity sensation is intact, and patient denies cauda equina symptoms and progressive weakness. Patient's objective measures for his referring diagnosis were not assessed due to his physical state upon arrival. He was instructed to monitor his symptoms and go to the ER if they should worsen. He was also instructed to work on lumbar extension ROM to manage his symptoms.    Plan:  OP PT Plan  Treatment/Interventions: Education/ Instruction, Electrical stimulation, Gait training, Mechanical traction, Manual therapy, Neuromuscular re-education, Therapeutic activities, Therapeutic exercises  PT Plan: Skilled PT  PT Frequency: 1 time per week  Duration: 8 weeks  Onset Date: 10/02/23  Certification Period Start Date: 05/22/24  Certification Period End Date: 08/20/24  Number of Treatments Authorized: 12/30 (Used 12 visits in 2023 this episode)    Goals:  Active       PT Problem       PT Goal 1       Start:  02/05/24    Expected End:  07/17/24       *STG MET*  LTG:   - Progress HEP and OMID within pt tolerance to goal attainment, including return to ADL/IADL and functional activities w/ less fall potential/improved safety  - Pt. will have improved balance ability as evidenced by increased SLS time of 10 sec for improved everyday balance and less fall potential  - Pt. will have safe, independent ambulation with safest/least restrictive assistive device for community distances and stairs/curbs with normalized gait pattern  - Pt. will have increased strength of at least 1/3 muscle grade in affected muscle groups- B hips  - Pt.  will have at least 8 point improvement in functional test score- LEFS-as evidenced by self report of improved ability to perform every day activities w/ greater ease and less pain  - Pt will be supervised and a gait belt will be used if needed to assure safety in the clinic during treatment          Patient Stated Goal 1       Start:  02/05/24    Expected End:  07/17/24       Safely perform all daily activities w/o falling              Zachery Foster, PT

## 2024-05-29 ENCOUNTER — TREATMENT (OUTPATIENT)
Dept: PHYSICAL THERAPY | Facility: CLINIC | Age: 74
End: 2024-05-29
Payer: MEDICARE

## 2024-05-29 DIAGNOSIS — R26.2 DIFFICULTY WALKING: Primary | ICD-10-CM

## 2024-05-29 PROCEDURE — 97110 THERAPEUTIC EXERCISES: CPT | Mod: GP | Performed by: PHYSICAL MEDICINE & REHABILITATION

## 2024-05-29 PROCEDURE — 97530 THERAPEUTIC ACTIVITIES: CPT | Mod: GP | Performed by: PHYSICAL MEDICINE & REHABILITATION

## 2024-05-29 ASSESSMENT — PAIN SCALES - GENERAL: PAINLEVEL_OUTOF10: 4

## 2024-05-29 ASSESSMENT — PAIN - FUNCTIONAL ASSESSMENT: PAIN_FUNCTIONAL_ASSESSMENT: 0-10

## 2024-05-29 NOTE — PROGRESS NOTES
Physical Therapy Treatment    Patient Name: Edilson Cantu  MRN: [id number]  Today's Date: 5/29/2024  Time Calculation  Start Time: 1232  Stop Time: 1315  Time Calculation (min): 43 min  PT Therapeutic Procedures Time Entry  Therapeutic Exercise Time Entry: 33  Therapeutic Activity Time Entry: 10,      Current Problem  1. Difficulty walking            Insurance:  Number of Treatments Authorized: 13/30 (Used 12 visits in 2023 this episode)  Certification Period Start Date: 05/22/24  Certification Period End Date: 08/20/24    Subjective   General  Reason for Referral: DIFFICULTY WALKING  Referred By: Dr Landeros  General Comment: Patient reports feeling good improvement in his pain levels. He notes that he continues to have weakness in his right leg, but it is improving. He notes that he is able to walk with much less difficulty as well.    Performing HEP?: Yes    Precautions  Precautions  Precautions Comment: high fall risk, post op R TKR  Pain  Pain Assessment: 0-10  Pain Score: 4  Pain Type: Chronic pain  Pain Location: Back    Objective   Treatments:    Therapeutic Exercise  Therapeutic Exercise Activity 1: ARCENIO: 2x10  Therapeutic Exercise Activity 2: Supine Heel Slides: 2x10  Therapeutic Exercise Activity 3: SLR: 3x5 each leg (therapist assist with the RLE)  Therapeutic Exercise Activity 4: Seated LAQ: 3x10 each leg  Therapeutic Exercise Activity 5: Sidelying Hip Abduction: 2x10 each leg    Therapeutic Activity  Therapeutic Activity 1: Sit to Stand from edge of table: 2x10    Assessment:  PT Assessment  Assessment Comment: Today's visit focused on progressing activities to improve patient's bilateral lower extremity strength and functional ability. Patient demonstrated good effort toward today's progressions. No increased pain reported at the conclusion of the session, although moderate levels of fatigue were observed. Overall response toward today's interventions was great.     Plan:  OP PT Plan  PT Plan: Skilled  PT (Continue with current POC. Progress strength and functional ability as tolerated.)  PT Frequency: 1 time per week  Duration: 8 weeks  Onset Date: 10/02/23  Certification Period Start Date: 05/22/24  Certification Period End Date: 08/20/24  Number of Treatments Authorized: 13/30 (Used 12 visits in 2023 this episode)    Goals:  Active       PT Problem       PT Goal 1       Start:  02/05/24    Expected End:  07/17/24       *STG MET*  LTG:   - Progress HEP and OMID within pt tolerance to goal attainment, including return to ADL/IADL and functional activities w/ less fall potential/improved safety  - Pt. will have improved balance ability as evidenced by increased SLS time of 10 sec for improved everyday balance and less fall potential  - Pt. will have safe, independent ambulation with safest/least restrictive assistive device for community distances and stairs/curbs with normalized gait pattern  - Pt. will have increased strength of at least 1/3 muscle grade in affected muscle groups- B hips  - Pt. will have at least 8 point improvement in functional test score- LEFS-as evidenced by self report of improved ability to perform every day activities w/ greater ease and less pain  - Pt will be supervised and a gait belt will be used if needed to assure safety in the clinic during treatment          Patient Stated Goal 1       Start:  02/05/24    Expected End:  07/17/24       Safely perform all daily activities w/o falling              Zachery Foster, PT

## 2024-06-05 ENCOUNTER — TREATMENT (OUTPATIENT)
Dept: PHYSICAL THERAPY | Facility: CLINIC | Age: 74
End: 2024-06-05
Payer: MEDICARE

## 2024-06-05 DIAGNOSIS — R26.2 DIFFICULTY WALKING: Primary | ICD-10-CM

## 2024-06-05 PROCEDURE — 97530 THERAPEUTIC ACTIVITIES: CPT | Mod: GP | Performed by: PHYSICAL MEDICINE & REHABILITATION

## 2024-06-05 PROCEDURE — 97110 THERAPEUTIC EXERCISES: CPT | Mod: GP | Performed by: PHYSICAL MEDICINE & REHABILITATION

## 2024-06-05 ASSESSMENT — PAIN SCALES - GENERAL: PAINLEVEL_OUTOF10: 1

## 2024-06-05 ASSESSMENT — PAIN - FUNCTIONAL ASSESSMENT: PAIN_FUNCTIONAL_ASSESSMENT: 0-10

## 2024-06-05 NOTE — PROGRESS NOTES
"  Physical Therapy Treatment    Patient Name: Edilson Cantu  MRN: [id number]  Today's Date: 6/5/2024  Time Calculation  Start Time: 1315  Stop Time: 1353  Time Calculation (min): 38 min  PT Therapeutic Procedures Time Entry  Therapeutic Exercise Time Entry: 30  Therapeutic Activity Time Entry: 8,      Current Problem  1. Difficulty walking            Insurance:  Number of Treatments Authorized: 14/30 (Used 12 visits in 2023 this episode)  Certification Period Start Date: 05/22/24  Certification Period End Date: 08/20/24    Subjective   General  Reason for Referral: DIFFICULTY WALKING  Referred By: Dr Landeros  General Comment: Patient reports feeling good improvement in his pain levels. He notes that he continues to have weakness in his right leg, but it is improving. He notes that he is able to walk with much less difficulty as well.    Performing HEP?: Yes    Precautions  Precautions  Precautions Comment: high fall risk, post op R TKR  Pain  Pain Assessment: 0-10  Pain Score: 1  Pain Location: Back    Objective     Treatments:    Therapeutic Exercise  Therapeutic Exercise Activity 1: Supine Heel Slides: 3x10  Therapeutic Exercise Activity 2: SLR: 4x5 each leg (therapist assist with the RLE)  Therapeutic Exercise Activity 3: Hip Adduction Ball Squeeze: 2x10x5\" holds  Therapeutic Exercise Activity 4: Seated LAQ: 3x10 each leg w #1  Therapeutic Exercise Activity 5: Seated Hip Flexion: 3x10 each leg w 1#  Therapeutic Exercise Activity 6: Sidelying Hip Abduction: 2x10 each leg    Therapeutic Activity  Therapeutic Activity 1: Sit to Stand from edge of table: 4x5; Seat Height: 21\"    Assessment:  PT Assessment  Assessment Comment: Today's visit focused on progressing activities to improve patient's bilateral lower extremity strength and functional ability. Patient demonstrated good effort toward today's progressions. No increased pain reported at the conclusion of the session, although moderate levels of fatigue were " observed. Overall response toward today's interventions was great.    Plan:  OP PT Plan  PT Plan: Skilled PT (Continue with current POC. Progress strength and functional ability as tolerated.)  PT Frequency: 1 time per week  Duration: 8 weeks  Onset Date: 10/02/23  Certification Period Start Date: 05/22/24  Certification Period End Date: 08/20/24  Number of Treatments Authorized: 14/30 (Used 12 visits in 2023 this episode)    Goals:  Active       PT Problem       PT Goal 1       Start:  02/05/24    Expected End:  07/17/24       *STG MET*  LTG:   - Progress HEP and OMID within pt tolerance to goal attainment, including return to ADL/IADL and functional activities w/ less fall potential/improved safety  - Pt. will have improved balance ability as evidenced by increased SLS time of 10 sec for improved everyday balance and less fall potential  - Pt. will have safe, independent ambulation with safest/least restrictive assistive device for community distances and stairs/curbs with normalized gait pattern  - Pt. will have increased strength of at least 1/3 muscle grade in affected muscle groups- B hips  - Pt. will have at least 8 point improvement in functional test score- LEFS-as evidenced by self report of improved ability to perform every day activities w/ greater ease and less pain  - Pt will be supervised and a gait belt will be used if needed to assure safety in the clinic during treatment          Patient Stated Goal 1       Start:  02/05/24    Expected End:  07/17/24       Safely perform all daily activities w/o falling              Zachery Foster, PT

## 2024-06-08 DIAGNOSIS — G20.A1 PARKINSON'S DISEASE WITHOUT DYSKINESIA OR FLUCTUATING MANIFESTATIONS (MULTI): ICD-10-CM

## 2024-06-08 DIAGNOSIS — I10 ESSENTIAL (PRIMARY) HYPERTENSION: ICD-10-CM

## 2024-06-10 RX ORDER — AMLODIPINE BESYLATE 5 MG/1
5 TABLET ORAL DAILY
Qty: 90 TABLET | Refills: 0 | Status: SHIPPED | OUTPATIENT
Start: 2024-06-10

## 2024-06-10 RX ORDER — CARBIDOPA AND LEVODOPA 25; 100 MG/1; MG/1
2 TABLET ORAL
Qty: 720 TABLET | Refills: 3 | Status: SHIPPED | OUTPATIENT
Start: 2024-06-10

## 2024-06-14 ENCOUNTER — TREATMENT (OUTPATIENT)
Dept: PHYSICAL THERAPY | Facility: CLINIC | Age: 74
End: 2024-06-14
Payer: MEDICARE

## 2024-06-14 DIAGNOSIS — R26.2 DIFFICULTY WALKING: Primary | ICD-10-CM

## 2024-06-14 PROCEDURE — 97110 THERAPEUTIC EXERCISES: CPT | Mod: GP | Performed by: PHYSICAL MEDICINE & REHABILITATION

## 2024-06-14 PROCEDURE — 97530 THERAPEUTIC ACTIVITIES: CPT | Mod: GP | Performed by: PHYSICAL MEDICINE & REHABILITATION

## 2024-06-14 ASSESSMENT — PAIN SCALES - GENERAL: PAINLEVEL_OUTOF10: 1

## 2024-06-14 ASSESSMENT — PAIN - FUNCTIONAL ASSESSMENT: PAIN_FUNCTIONAL_ASSESSMENT: 0-10

## 2024-06-14 NOTE — PROGRESS NOTES
"  Physical Therapy Treatment    Patient Name: Edilson Cantu  MRN: [id number]  Today's Date: 6/14/2024  Time Calculation  Start Time: 1232  Stop Time: 1311  Time Calculation (min): 39 min  PT Therapeutic Procedures Time Entry  Therapeutic Exercise Time Entry: 34  Therapeutic Activity Time Entry: 5,      Current Problem  1. Difficulty walking            Insurance:  Number of Treatments Authorized: 15/30 (Used 12 visits in 2023 this episode)  Certification Period Start Date: 05/22/24  Certification Period End Date: 08/20/24    Subjective   General  Reason for Referral: DIFFICULTY WALKING  Referred By: Dr Landeros  General Comment: Patient continues to report slow, gradual improvement of his right leg strength. He notes that his left knee has been painful as of late, and is getting progressively worse.    Performing HEP?: Yes    Precautions  Precautions  Precautions Comment: high fall risk, post op R TKR  Pain  Pain Assessment: 0-10  Pain Score: 1  Pain Location: Back    Objective   Treatments:    Therapeutic Exercise  Therapeutic Exercise Activity 1: Supine Heel Slides: 3x10  Therapeutic Exercise Activity 2: SLR: 4x5 each leg (min assist w R leg)  Therapeutic Exercise Activity 3: Hip Adduction Ball Squeeze: 2x10x5\" holds  Therapeutic Exercise Activity 4: Seated LAQ: 3x10 each leg w #1  Therapeutic Exercise Activity 5: Seated Hip Flexion: 3x10 each leg w 1#  Therapeutic Exercise Activity 6: Sidelying Hip Abduction: 2x10 each leg  Therapeutic Exercise Activity 7: Seated HS Curls: 2x10 each leg w RTB    Therapeutic Activity  Therapeutic Activity 1: Sit to Stand from table with staggered stance: x5  Therapeutic Activity 2: Sit to Stand from table with wide stance: x5    Assessment:  PT Assessment  Assessment Comment: Today's visit focused on progressing activities to improve patient's bilateral lower extremity strength and functional ability. Patient demonstrated good effort toward today's progressions. No increased pain " reported at the conclusion of the session, although moderate levels of fatigue were observed. Overall response toward today's interventions was great.    Plan:  OP PT Plan  PT Plan: Skilled PT (Continue with current POC. Progress strength and functional ability as tolerated.)  PT Frequency: 1 time per week  Duration: 8 weeks  Onset Date: 10/02/23  Certification Period Start Date: 05/22/24  Certification Period End Date: 08/20/24  Number of Treatments Authorized: 15/30 (Used 12 visits in 2023 this episode)    Goals:  Active       PT Problem       PT Goal 1       Start:  02/05/24    Expected End:  07/17/24       *STG MET*  LTG:   - Progress HEP and OMID within pt tolerance to goal attainment, including return to ADL/IADL and functional activities w/ less fall potential/improved safety  - Pt. will have improved balance ability as evidenced by increased SLS time of 10 sec for improved everyday balance and less fall potential  - Pt. will have safe, independent ambulation with safest/least restrictive assistive device for community distances and stairs/curbs with normalized gait pattern  - Pt. will have increased strength of at least 1/3 muscle grade in affected muscle groups- B hips  - Pt. will have at least 8 point improvement in functional test score- LEFS-as evidenced by self report of improved ability to perform every day activities w/ greater ease and less pain  - Pt will be supervised and a gait belt will be used if needed to assure safety in the clinic during treatment          Patient Stated Goal 1       Start:  02/05/24    Expected End:  07/17/24       Safely perform all daily activities w/o falling              Zachrey Foster, PT

## 2024-06-14 NOTE — ADDENDUM NOTE
Addendum  created 06/13/24 2610 by SAVITA Ponce    Intraprocedure Event edited, Intraprocedure Staff edited

## 2024-06-25 ENCOUNTER — TREATMENT (OUTPATIENT)
Dept: PHYSICAL THERAPY | Facility: CLINIC | Age: 74
End: 2024-06-25
Payer: MEDICARE

## 2024-06-25 DIAGNOSIS — R26.2 DIFFICULTY WALKING: Primary | ICD-10-CM

## 2024-06-25 PROCEDURE — 97110 THERAPEUTIC EXERCISES: CPT | Mod: GP | Performed by: PHYSICAL MEDICINE & REHABILITATION

## 2024-06-25 ASSESSMENT — PAIN - FUNCTIONAL ASSESSMENT: PAIN_FUNCTIONAL_ASSESSMENT: 0-10

## 2024-06-25 ASSESSMENT — PAIN SCALES - GENERAL: PAINLEVEL_OUTOF10: 1

## 2024-06-25 NOTE — PROGRESS NOTES
"  Physical Therapy Treatment    Patient Name: Edilson Cantu  MRN: [id number]  Today's Date: 6/25/2024  Time Calculation  Start Time: 1348  Stop Time: 1426  Time Calculation (min): 38 min  PT Therapeutic Procedures Time Entry  Therapeutic Exercise Time Entry: 38,      Current Problem  1. Difficulty walking            Insurance:  Number of Treatments Authorized: 16/30 (Used 12 visits in 2023 this episode)  Certification Period Start Date: 05/22/24  Certification Period End Date: 08/20/24    Subjective   General  Reason for Referral: DIFFICULTY WALKING  Referred By: Dr Landeros  General Comment: Patient reports feeling sore and aching all over this visit. No new complaints otherwise.    Performing HEP?: Yes    Precautions  Precautions  Precautions Comment: high fall risk, post op R TKR  Pain  Pain Assessment: 0-10  0-10 (Numeric) Pain Score: 1  Pain Location: Back    Objective   Treatments:    Therapeutic Exercise  Therapeutic Exercise Activity 1: Supine Heel Slides: 3x10  Therapeutic Exercise Activity 2: SLR: 5x5 each leg (small range)  Therapeutic Exercise Activity 3: Hip Adduction Ball Squeeze: 2x10x5\" holds  Therapeutic Exercise Activity 4: Seated LAQ: 3x10 each leg w #1.5  Therapeutic Exercise Activity 5: Seated Hip Flexion: 3x10 each leg w 1.5#  Therapeutic Exercise Activity 6: Sidelying Hip Abduction: 2x10 each leg  Therapeutic Exercise Activity 7: Seated HS Curls: 3x10 each leg w GTB    Assessment:  PT Assessment  Assessment Comment: Today's visit focused on progressing activities to improve patient's bilateral lower extremity strength and functional ability. Patient demonstrated good effort toward today's progressions despite his subjective reporting upon arrival. Patient's spouse reported that he experienced a couple of falls, which is why he is feeling sore today. No increased pain reported at the conclusion of the session, although moderate levels of fatigue were observed. Overall response toward today's " interventions was great.    Plan:  OP PT Plan  PT Plan: Skilled PT (Continue with current POC. Progress strength and functional ability as tolerated.)  PT Frequency: 1 time per week  Duration: 8 weeks  Onset Date: 10/02/23  Certification Period Start Date: 05/22/24  Certification Period End Date: 08/20/24  Number of Treatments Authorized: 16/30 (Used 12 visits in 2023 this episode)    Goals:  Active       PT Problem       PT Goal 1       Start:  02/05/24    Expected End:  07/17/24       *STG MET*  LTG:   - Progress HEP and OMID within pt tolerance to goal attainment, including return to ADL/IADL and functional activities w/ less fall potential/improved safety  - Pt. will have improved balance ability as evidenced by increased SLS time of 10 sec for improved everyday balance and less fall potential  - Pt. will have safe, independent ambulation with safest/least restrictive assistive device for community distances and stairs/curbs with normalized gait pattern  - Pt. will have increased strength of at least 1/3 muscle grade in affected muscle groups- B hips  - Pt. will have at least 8 point improvement in functional test score- LEFS-as evidenced by self report of improved ability to perform every day activities w/ greater ease and less pain  - Pt will be supervised and a gait belt will be used if needed to assure safety in the clinic during treatment          Patient Stated Goal 1       Start:  02/05/24    Expected End:  07/17/24       Safely perform all daily activities w/o falling              Zachery Foster, PT

## 2024-07-17 ENCOUNTER — TREATMENT (OUTPATIENT)
Dept: PHYSICAL THERAPY | Facility: CLINIC | Age: 74
End: 2024-07-17
Payer: MEDICARE

## 2024-07-17 DIAGNOSIS — R26.2 DIFFICULTY WALKING: Primary | ICD-10-CM

## 2024-07-17 PROCEDURE — 97110 THERAPEUTIC EXERCISES: CPT | Mod: GP | Performed by: PHYSICAL MEDICINE & REHABILITATION

## 2024-07-17 PROCEDURE — 97530 THERAPEUTIC ACTIVITIES: CPT | Mod: GP | Performed by: PHYSICAL MEDICINE & REHABILITATION

## 2024-07-17 ASSESSMENT — PAIN - FUNCTIONAL ASSESSMENT: PAIN_FUNCTIONAL_ASSESSMENT: 0-10

## 2024-07-17 ASSESSMENT — PAIN SCALES - GENERAL: PAINLEVEL_OUTOF10: 3

## 2024-07-17 NOTE — PROGRESS NOTES
"  Physical Therapy Treatment    Patient Name: Edilson Cantu  MRN: [id number]  Today's Date: 7/17/2024  Time Calculation  Start Time: 1403  Stop Time: 1441  Time Calculation (min): 38 min  PT Therapeutic Procedures Time Entry  Therapeutic Exercise Time Entry: 32  Therapeutic Activity Time Entry: 6,      Current Problem  1. Difficulty walking  Follow Up In Physical Therapy          Insurance:  Number of Treatments Authorized: 16/30 (Used 12 visits in 2023 this episode)  Certification Period Start Date: 05/22/24  Certification Period End Date: 08/20/24    Subjective   General  Reason for Referral: DIFFICULTY WALKING  Referred By: Dr Landeros  General Comment: Patient returns from vacation today, and notes feeling some increased achiness in his low back from all of the traveling. He also reports that his left knee is progressively worsening, and he thinks he will need to get it replaced soon.    Performing HEP?: Yes    Precautions  Precautions  Precautions Comment: high fall risk, post op R TKR  Pain  Pain Assessment: 0-10  0-10 (Numeric) Pain Score: 3  Pain Location: Back    Objective   Treatments:    Therapeutic Exercise  Therapeutic Exercise Activity 1: Supine Heel Slides: 3x10  Therapeutic Exercise Activity 2: SLR: 3x10 each leg (small range)  Therapeutic Exercise Activity 3: Hip Adduction Ball Squeeze: 2x10x5\" holds  Therapeutic Exercise Activity 4: Seated LAQ: 3x10 each leg w #1.5  Therapeutic Exercise Activity 5: Seated Hip Flexion: 3x10 each leg w 1.5#  Therapeutic Exercise Activity 6: Sidelying Hip Abduction: 2x10 each leg  Therapeutic Exercise Activity 7: Seated HS Curls: 3x10 each leg w black TB    Therapeutic Activity  Therapeutic Activity 1: Sit to Stand from elevated table: 3x10    Assessment:  PT Assessment  Assessment Comment: Today's visit focused on progressing activities to improve patient's bilateral lower extremity strength and functional ability. Patient demonstrated good effort toward today's " progressions. No increased pain reported at the conclusion of the session. Overall response toward today's interventions was great. Patient continues to display marked levels of weakness in his right lower extremity, which impairs his ability to ambulate. Therefore, patient will continue to benefit from skilled physical therapy intervention in order to address this deficit and improve overall quality of life.    Plan:  OP PT Plan  PT Plan: Skilled PT (Continue with current POC. Progress strength and functional ability as tolerated.)  PT Frequency: 1 time per week  Duration: 8 weeks  Onset Date: 10/02/23  Certification Period Start Date: 05/22/24  Certification Period End Date: 08/20/24  Number of Treatments Authorized: 16/30 (Used 12 visits in 2023 this episode)    Goals:  Active       PT Problem       PT Goal 1       Start:  02/05/24    Expected End:  07/17/24       *STG MET*  LTG:   - Progress HEP and OMID within pt tolerance to goal attainment, including return to ADL/IADL and functional activities w/ less fall potential/improved safety  - Pt. will have improved balance ability as evidenced by increased SLS time of 10 sec for improved everyday balance and less fall potential  - Pt. will have safe, independent ambulation with safest/least restrictive assistive device for community distances and stairs/curbs with normalized gait pattern  - Pt. will have increased strength of at least 1/3 muscle grade in affected muscle groups- B hips  - Pt. will have at least 8 point improvement in functional test score- LEFS-as evidenced by self report of improved ability to perform every day activities w/ greater ease and less pain  - Pt will be supervised and a gait belt will be used if needed to assure safety in the clinic during treatment          Patient Stated Goal 1       Start:  02/05/24    Expected End:  07/17/24       Safely perform all daily activities w/o falling              Zachery Foster PT

## 2024-07-24 ENCOUNTER — TREATMENT (OUTPATIENT)
Dept: PHYSICAL THERAPY | Facility: CLINIC | Age: 74
End: 2024-07-24
Payer: MEDICARE

## 2024-07-24 ENCOUNTER — APPOINTMENT (OUTPATIENT)
Dept: PHYSICAL THERAPY | Facility: CLINIC | Age: 74
End: 2024-07-24
Payer: MEDICARE

## 2024-07-24 DIAGNOSIS — R26.2 DIFFICULTY WALKING: ICD-10-CM

## 2024-07-24 PROCEDURE — 97110 THERAPEUTIC EXERCISES: CPT | Mod: GP | Performed by: PHYSICAL MEDICINE & REHABILITATION

## 2024-07-24 PROCEDURE — 97530 THERAPEUTIC ACTIVITIES: CPT | Mod: GP | Performed by: PHYSICAL MEDICINE & REHABILITATION

## 2024-07-24 ASSESSMENT — PAIN - FUNCTIONAL ASSESSMENT: PAIN_FUNCTIONAL_ASSESSMENT: 0-10

## 2024-07-24 ASSESSMENT — PAIN SCALES - GENERAL: PAINLEVEL_OUTOF10: 3

## 2024-07-24 NOTE — PROGRESS NOTES
"  Physical Therapy Treatment    Patient Name: Edilson Cantu  MRN: [id number]  Today's Date: 7/24/2024  Time Calculation  Start Time: 1316  Stop Time: 1354  Time Calculation (min): 38 min  PT Therapeutic Procedures Time Entry  Therapeutic Exercise Time Entry: 28  Therapeutic Activity Time Entry: 10,      Current Problem  1. Difficulty walking  Follow Up In Physical Therapy          Insurance:  Number of Treatments Authorized: 17/30 (Used 12 visits in 2023 this episode)  Certification Period Start Date: 05/22/24  Certification Period End Date: 08/20/24    Subjective   General  Reason for Referral: DIFFICULTY WALKING  Referred By: Dr Landeros  General Comment: Patient reports feeling stiff and sore in his low back and his knees this visit. He attributes this to sleeping in an awkward position. No new complaints otherwise.    Performing HEP?: Yes    Precautions  Precautions  Precautions Comment: high fall risk, post op R TKR  Pain  Pain Assessment: 0-10  0-10 (Numeric) Pain Score: 3  Pain Location: Back    Objective   Treatments:    Therapeutic Exercise  Therapeutic Exercise Activity 1: Supine Heel Slides: 3x10  Therapeutic Exercise Activity 2: SLR: 3x10 each leg (small range)  Therapeutic Exercise Activity 3: Hip Adduction Ball Squeeze: 2x10x5\" holds  Therapeutic Exercise Activity 4: Seated LAQ: 3x10 each leg w 2#  Therapeutic Exercise Activity 5: Seated Hip Flexion: 3x10 each leg w 2#  Therapeutic Exercise Activity 6: Sidelying Hip Abduction: 2x10 each leg  Therapeutic Exercise Activity 7: Seated HS Curls: 3x10 each leg w black TB    Therapeutic Activity  Therapeutic Activity 1: Side stepping along edge of table: x4 down and back  Therapeutic Activity 2: Sit to Stand from elevated table: 2x10    Assessment:  PT Assessment  Assessment Comment: Today's visit focused on progressing activities to improve patient's bilateral lower extremity strength and functional ability. Patient demonstrated good effort toward today's " progressions. No increased pain reported at the conclusion of the session. Overall response toward today's interventions was great. Patient continues to display marked levels of weakness in his right lower extremity, which impairs his ability to ambulate. Therefore, patient will continue to benefit from skilled physical therapy intervention in order to address this deficit and improve overall quality of life.    Plan:  OP PT Plan  PT Plan: Skilled PT (Continue with current POC. Progress strength and functional ability as tolerated.)  PT Frequency: 1 time per week  Duration: 8 weeks  Onset Date: 10/02/23  Certification Period Start Date: 05/22/24  Certification Period End Date: 08/20/24  Number of Treatments Authorized: 17/30 (Used 12 visits in 2023 this episode)    Goals:  Active       PT Problem       PT Goal 1       Start:  02/05/24    Expected End:  07/17/24       *STG MET*  LTG:   - Progress HEP and OMID within pt tolerance to goal attainment, including return to ADL/IADL and functional activities w/ less fall potential/improved safety  - Pt. will have improved balance ability as evidenced by increased SLS time of 10 sec for improved everyday balance and less fall potential  - Pt. will have safe, independent ambulation with safest/least restrictive assistive device for community distances and stairs/curbs with normalized gait pattern  - Pt. will have increased strength of at least 1/3 muscle grade in affected muscle groups- B hips  - Pt. will have at least 8 point improvement in functional test score- LEFS-as evidenced by self report of improved ability to perform every day activities w/ greater ease and less pain  - Pt will be supervised and a gait belt will be used if needed to assure safety in the clinic during treatment          Patient Stated Goal 1       Start:  02/05/24    Expected End:  07/17/24       Safely perform all daily activities w/o falling              Zachery Foster PT

## 2024-07-26 ENCOUNTER — APPOINTMENT (OUTPATIENT)
Dept: PHYSICAL THERAPY | Facility: CLINIC | Age: 74
End: 2024-07-26
Payer: MEDICARE

## 2024-07-31 ENCOUNTER — APPOINTMENT (OUTPATIENT)
Dept: PHYSICAL THERAPY | Facility: CLINIC | Age: 74
End: 2024-07-31
Payer: MEDICARE

## 2024-08-01 ENCOUNTER — TREATMENT (OUTPATIENT)
Dept: PHYSICAL THERAPY | Facility: CLINIC | Age: 74
End: 2024-08-01
Payer: MEDICARE

## 2024-08-01 DIAGNOSIS — R26.2 DIFFICULTY WALKING: ICD-10-CM

## 2024-08-01 PROCEDURE — 97112 NEUROMUSCULAR REEDUCATION: CPT | Mod: GP | Performed by: PHYSICAL THERAPIST

## 2024-08-01 PROCEDURE — 97110 THERAPEUTIC EXERCISES: CPT | Mod: GP | Performed by: PHYSICAL THERAPIST

## 2024-08-01 ASSESSMENT — PAIN SCALES - GENERAL: PAINLEVEL_OUTOF10: 3

## 2024-08-01 ASSESSMENT — PAIN - FUNCTIONAL ASSESSMENT: PAIN_FUNCTIONAL_ASSESSMENT: 0-10

## 2024-08-01 NOTE — PROGRESS NOTES
"  Physical Therapy Treatment    Patient Name: Edilson Cantu  MRN: [id number]  Today's Date: 8/1/2024  Time Calculation  Start Time: 1530  Stop Time: 1615  Time Calculation (min): 45 min  PT Therapeutic Procedures Time Entry  Neuromuscular Re-Education Time Entry: 25  Therapeutic Exercise Time Entry: 15,      Current Problem  1. Difficulty walking  Follow Up In Physical Therapy            Insurance:  Payor: [employer] / Plan: [employer] / Product Type: *No Product type* /   Number of Treatments Authorized: 18/30 (Used 12 visits in 2023 this episode)  Certification Period Start Date: 05/22/24  Certification Period End Date: 08/20/24    Subjective :  Reports falling about 3 x's a day. Usually during \"transitions\" from one place to the next. Sometimes needs assist to get up. Spouse assists him up often. To see Neuro next week.   General  Reason for Referral: DIFFICULTY WALKING  Referred By: Dr Landeros  Past Medical History Relevant to Rehab: PD dx in 2020. Taking CD/LD every 3 hours.  General Comment: Reports that her continues to fall frequently.        Performing HEP?: Yes  Various leg lifts/sit to stands.   Precautions  Precautions  Precautions Comment: high fall risk, post op R TKR  Pain  Pain Assessment: 0-10  0-10 (Numeric) Pain Score: 3  Pain Location: Back    Objective   Slouched posture. Instructed in best possible sitting posture - recommending chair changes for home.   Treatments:    Therapeutic Exercise  Therapeutic Exercise Activity 1: Lars supine positioning  Therapeutic Exercise Activity 2: Lars shoulder press: 5 hold x 5  Therapeutic Exercise Activity 3: Toy head press: 5 hold x 5  Therapeutic Exercise Activity 4: Lars leg press: 5 hold x 5  Therapeutic Exercise Activity 5: Seated pelvic tilts x 5    Balance/Neuromuscular Re-Education  Balance/Neuromuscular Re-Education Activity 1: Edu re: amplitude of effort during all activity  Balance/Neuromuscular Re-Education Activity 2: Sit to " stand: cues on amplitues - 3 x 5  Balance/Neuromuscular Re-Education Activity 3: Walking 15 ft with turns: cues for proper amplitdue - 6 x 10 ft.    OP EDUCATION:  Outpatient Education  Plan of Care Discussed and Agreed Upon: yes  Patient Response to Education: Patient/Caregiver Verbalized Understanding of Information    Assessment:  PT Assessment  Assessment Comment: Session focus on role of posture in movement deficits and issued HEP for postural changes. Edu re: need to increase amplitude of effort to improve movement quality - but within ranges he can currently control. Demonstrated ability to improve transfers and gait with stimulability testing. Will benefit from therapy to address movement disorder and to reduce falling.  Consider MiniBest testing in 3-4 weeks.    Plan:  OP PT Plan  Treatment/Interventions: Education/ Instruction, Gait training, Manual therapy, Neuromuscular re-education, Self care/ home management, Taping techniques, Therapeutic activities, Therapeutic exercises  PT Frequency: 1 time per week  Duration: 8 weeks  Onset Date: 10/02/23  Certification Period Start Date: 05/22/24  Certification Period End Date: 08/20/24  Number of Treatments Authorized: 18/30 (Used 12 visits in 2023 this episode)    Review HEP changes.  Add pelvic tilts using wedge to assist motion.  Consider seated and sustained exercises for HEP.   PN  next visit.   Goals:  Active       PT Problem       PT Goal 1       Start:  02/05/24    Expected End:  09/26/24       *STG MET*  LTG:   - Progress HEP and OMID within pt tolerance to goal attainment, including return to ADL/IADL and functional activities w/ less fall potential/improved safety. TO ADDRESS WITH PD SPECIFIC EXERCISE PROGRAM.  - Pt. will have improved balance ability as evidenced by increased SLS time of 10 sec for improved everyday balance and less fall potential  - Pt. will have safe, independent ambulation with safest/least restrictive assistive device for  community distances and stairs/curbs with normalized gait pattern  - Pt. will have increased strength of at least 1/3 muscle grade in affected muscle groups- B hips  - Pt. will have at least 8 point improvement in functional test score- LEFS-as evidenced by self report of improved ability to perform every day activities w/ greater ease and less pain  - Pt will be supervised and a gait belt will be used if needed to assure safety in the clinic during treatment          Patient Stated Goal 1       Start:  02/05/24    Expected End:  09/26/24       Safely perform all daily activities w/o falling              Bryan Pena, PT

## 2024-08-05 ENCOUNTER — APPOINTMENT (OUTPATIENT)
Dept: PRIMARY CARE | Facility: CLINIC | Age: 74
End: 2024-08-05
Payer: MEDICARE

## 2024-08-05 ENCOUNTER — APPOINTMENT (OUTPATIENT)
Dept: NEUROLOGY | Facility: HOSPITAL | Age: 74
End: 2024-08-05
Payer: MEDICARE

## 2024-08-05 VITALS
HEART RATE: 66 BPM | HEIGHT: 69 IN | WEIGHT: 149 LBS | BODY MASS INDEX: 22.07 KG/M2 | SYSTOLIC BLOOD PRESSURE: 126 MMHG | DIASTOLIC BLOOD PRESSURE: 64 MMHG | OXYGEN SATURATION: 94 %

## 2024-08-05 DIAGNOSIS — G20.A1 PARKINSON'S DISEASE WITHOUT DYSKINESIA OR FLUCTUATING MANIFESTATIONS (MULTI): ICD-10-CM

## 2024-08-05 DIAGNOSIS — M96.1 POSTLAMINECTOMY SYNDROME, LUMBAR REGION: ICD-10-CM

## 2024-08-05 DIAGNOSIS — Z12.5 SCREENING FOR PROSTATE CANCER: ICD-10-CM

## 2024-08-05 DIAGNOSIS — M21.372 LEFT FOOT DROP: ICD-10-CM

## 2024-08-05 DIAGNOSIS — M54.31 SCIATICA OF RIGHT SIDE: ICD-10-CM

## 2024-08-05 DIAGNOSIS — Z23 NEED FOR PROPHYLACTIC VACCINATION AGAINST DIPHTHERIA AND TETANUS: ICD-10-CM

## 2024-08-05 DIAGNOSIS — Z00.00 HEALTH CARE MAINTENANCE: ICD-10-CM

## 2024-08-05 DIAGNOSIS — Z00.00 ROUTINE GENERAL MEDICAL EXAMINATION AT HEALTH CARE FACILITY: Primary | ICD-10-CM

## 2024-08-05 DIAGNOSIS — R21 RASH: ICD-10-CM

## 2024-08-05 DIAGNOSIS — I10 PRIMARY HYPERTENSION: ICD-10-CM

## 2024-08-05 DIAGNOSIS — M48.061 SPINAL STENOSIS OF LUMBAR REGION, UNSPECIFIED WHETHER NEUROGENIC CLAUDICATION PRESENT: ICD-10-CM

## 2024-08-05 PROCEDURE — 3078F DIAST BP <80 MM HG: CPT | Performed by: INTERNAL MEDICINE

## 2024-08-05 PROCEDURE — 1123F ACP DISCUSS/DSCN MKR DOCD: CPT | Performed by: INTERNAL MEDICINE

## 2024-08-05 PROCEDURE — 1159F MED LIST DOCD IN RCRD: CPT | Performed by: INTERNAL MEDICINE

## 2024-08-05 PROCEDURE — 1036F TOBACCO NON-USER: CPT | Performed by: INTERNAL MEDICINE

## 2024-08-05 PROCEDURE — 3074F SYST BP LT 130 MM HG: CPT | Performed by: INTERNAL MEDICINE

## 2024-08-05 PROCEDURE — 1157F ADVNC CARE PLAN IN RCRD: CPT | Performed by: INTERNAL MEDICINE

## 2024-08-05 PROCEDURE — 99397 PER PM REEVAL EST PAT 65+ YR: CPT | Performed by: INTERNAL MEDICINE

## 2024-08-05 PROCEDURE — G0439 PPPS, SUBSEQ VISIT: HCPCS | Performed by: INTERNAL MEDICINE

## 2024-08-05 PROCEDURE — 1160F RVW MEDS BY RX/DR IN RCRD: CPT | Performed by: INTERNAL MEDICINE

## 2024-08-05 PROCEDURE — 1170F FXNL STATUS ASSESSED: CPT | Performed by: INTERNAL MEDICINE

## 2024-08-05 PROCEDURE — 3008F BODY MASS INDEX DOCD: CPT | Performed by: INTERNAL MEDICINE

## 2024-08-05 ASSESSMENT — ENCOUNTER SYMPTOMS
ABDOMINAL PAIN: 0
EYE DISCHARGE: 0
NAUSEA: 0
DIAPHORESIS: 0
DYSPHORIC MOOD: 0
SPEECH DIFFICULTY: 0
CONFUSION: 0
DIFFICULTY URINATING: 0
APPETITE CHANGE: 0
EYE PAIN: 0
AGITATION: 0
ACTIVITY CHANGE: 0
LIGHT-HEADEDNESS: 0
NERVOUS/ANXIOUS: 0
ABDOMINAL DISTENTION: 0
FACIAL SWELLING: 0
CHEST TIGHTNESS: 0
BLOOD IN STOOL: 0
POLYPHAGIA: 0
FATIGUE: 0
EYE REDNESS: 0
SHORTNESS OF BREATH: 0
EYE ITCHING: 0
HEADACHES: 0
FACIAL ASYMMETRY: 0
FEVER: 0
CHOKING: 0
NUMBNESS: 0
ARTHRALGIAS: 1
VOMITING: 0
HYPERACTIVE: 0
COUGH: 0
JOINT SWELLING: 0
POLYDIPSIA: 0
BACK PAIN: 1

## 2024-08-05 ASSESSMENT — PATIENT HEALTH QUESTIONNAIRE - PHQ9
2. FEELING DOWN, DEPRESSED OR HOPELESS: NOT AT ALL
1. LITTLE INTEREST OR PLEASURE IN DOING THINGS: NOT AT ALL
SUM OF ALL RESPONSES TO PHQ9 QUESTIONS 1 AND 2: 0

## 2024-08-05 ASSESSMENT — ACTIVITIES OF DAILY LIVING (ADL)
DRESSING: INDEPENDENT
DOING_HOUSEWORK: NEEDS ASSISTANCE
TAKING_MEDICATION: INDEPENDENT
MANAGING_FINANCES: INDEPENDENT
BATHING: INDEPENDENT
GROCERY_SHOPPING: INDEPENDENT

## 2024-08-05 NOTE — ASSESSMENT & PLAN NOTE
Postlaminectomy syndrome now with lumbar spinal canal stenosis with weakness of the right upper extremity that is been going on for months.  Will refer to orthopedist spine for further evaluation.

## 2024-08-05 NOTE — ASSESSMENT & PLAN NOTE
Controlled off medications.  Will continue to hold medications and monitor blood pressure accordingly.  Follow-up if blood pressure at home is consistently greater than 135 systolic

## 2024-08-05 NOTE — PROGRESS NOTES
Subjective   Reason for Visit: Edilson Cantu is an 73 y.o. male here for a Medicare Wellness visit.     Past Medical, Surgical, and Family History reviewed and updated in chart.    Reviewed all medications by prescribing practitioner or clinical pharmacist (such as prescriptions, OTCs, herbal therapies and supplements) and documented in the medical record.    HPI    Patient is a 73-year-old male with past medical history of hypertension Parkinson's disorder spinal canal stenosis who presents for Medicare wellness.  No specific complaints at this time.  He lives at home with his wife.  He is able to do his activities of daily living.  He uses a walker to get around.  He did reach out to his neurologist to discuss weakness in the left lower extremity especially on flexion of the hip and was advised to complete an MRI of the lumbar spine.  It did show progression of lumbar canal stenosis and he was advised to go to the emergency room.  He did not go to the emergency room instead he went to [facility].  He states that he has been doing well and there has been no progression of his symptoms but there is still weakness in the leg.  He stopped both his hydrochlorothiazide and amlodipine.  His blood pressure today is well-controlled.  No headache change in vision orthopnea.    Up-to-date on his vaccines.    Patient Care Team:  Juliano Weinstein DO as PCP - General (Internal Medicine)  Juliano Weinstein DO as PCP - [employer] PCP     Review of Systems   Constitutional:  Negative for activity change, appetite change, diaphoresis, fatigue and fever.   HENT:  Negative for dental problem, drooling, ear pain, facial swelling, hearing loss and nosebleeds.    Eyes:  Negative for pain, discharge, redness and itching.   Respiratory:  Negative for cough, choking, chest tightness and shortness of breath.    Gastrointestinal:  Negative for abdominal distention, abdominal pain, blood in stool, nausea and vomiting.   Endocrine:  "Negative for cold intolerance, heat intolerance, polydipsia and polyphagia.   Genitourinary:  Negative for difficulty urinating.   Musculoskeletal:  Positive for arthralgias, back pain and gait problem. Negative for joint swelling.   Skin:  Positive for rash.   Allergic/Immunologic: Negative for environmental allergies and food allergies.   Neurological:  Negative for facial asymmetry, speech difficulty, light-headedness, numbness and headaches.   Psychiatric/Behavioral:  Negative for agitation, confusion and dysphoric mood. The patient is not nervous/anxious and is not hyperactive.        Objective   Vitals:  /64   Pulse 66   Ht 1.753 m (5' 9\")   Wt 67.6 kg (149 lb)   SpO2 94%   BMI 22.00 kg/m²       Physical Exam  Constitutional:       Appearance: Normal appearance.   HENT:      Head: Normocephalic and atraumatic.      Nose: No congestion or rhinorrhea.      Mouth/Throat:      Mouth: Mucous membranes are moist.   Eyes:      Pupils: Pupils are equal, round, and reactive to light.   Cardiovascular:      Rate and Rhythm: Normal rate and regular rhythm.      Pulses: Normal pulses.      Heart sounds: Normal heart sounds. No murmur heard.     No friction rub. No gallop.   Pulmonary:      Effort: Pulmonary effort is normal. No respiratory distress.      Breath sounds: Normal breath sounds. No stridor. No wheezing or rales.   Abdominal:      General: Abdomen is flat. Bowel sounds are normal. There is no distension.      Palpations: Abdomen is soft. There is no mass.      Tenderness: There is no guarding.      Hernia: No hernia is present.   Musculoskeletal:         General: Deformity present. No swelling. Normal range of motion.      Cervical back: No rigidity.      Right lower leg: No edema.      Left lower leg: No edema.      Comments: Walking hunched over with a wide-based gait.  3 out of 5 strength right hip.     Skin:     General: Skin is warm and dry.   Neurological:      General: No focal deficit " present.      Mental Status: He is alert and oriented to person, place, and time. Mental status is at baseline.      Motor: No weakness.      Gait: Gait normal.   Psychiatric:         Mood and Affect: Mood normal.         Behavior: Behavior normal.         Thought Content: Thought content normal.         Assessment/Plan   Problem List Items Addressed This Visit             ICD-10-CM    HTN (hypertension) I10     Controlled off medications.  Will continue to hold medications and monitor blood pressure accordingly.  Follow-up if blood pressure at home is consistently greater than 135 systolic         Left foot drop M21.372    Parkinson's disease (Multi) G20.A1     Symptoms stable and following with neurology.  Symptoms controlled on carbidopa levodopa         Postlaminectomy syndrome, lumbar region M96.1     Postlaminectomy syndrome now with lumbar spinal canal stenosis with weakness of the right upper extremity that is been going on for months.  Will refer to orthopedist spine for further evaluation.          Other Visit Diagnoses         Codes    Routine general medical examination at health care facility    -  Primary Z00.00    Relevant Medications    diclofenac sodium 1 % kit    Sciatica of right side     M54.31    Relevant Medications    diclofenac sodium 1 % kit    Need for prophylactic vaccination against diphtheria and tetanus     Z23    Health care maintenance     Z00.00    Relevant Orders    Lipid Panel    Spinal stenosis of lumbar region, unspecified whether neurogenic claudication present     M48.061    Relevant Orders    Referral to Orthopaedic Surgery    Screening for prostate cancer     Z12.5    Relevant Orders    Prostate Specific Antigen, Screen    Rash     R21    Relevant Orders    Referral to Dermatology

## 2024-08-08 ENCOUNTER — TREATMENT (OUTPATIENT)
Dept: PHYSICAL THERAPY | Facility: CLINIC | Age: 74
End: 2024-08-08
Payer: MEDICARE

## 2024-08-08 DIAGNOSIS — R26.2 DIFFICULTY WALKING: Primary | ICD-10-CM

## 2024-08-08 DIAGNOSIS — G20.B2 PARKINSON'S DISEASE WITH DYSKINESIA AND FLUCTUATING MANIFESTATIONS (MULTI): ICD-10-CM

## 2024-08-08 PROCEDURE — 97110 THERAPEUTIC EXERCISES: CPT | Mod: GP | Performed by: PHYSICAL THERAPIST

## 2024-08-08 PROCEDURE — 97112 NEUROMUSCULAR REEDUCATION: CPT | Mod: GP | Performed by: PHYSICAL THERAPIST

## 2024-08-08 ASSESSMENT — PAIN - FUNCTIONAL ASSESSMENT: PAIN_FUNCTIONAL_ASSESSMENT: 0-10

## 2024-08-08 ASSESSMENT — PAIN SCALES - GENERAL: PAINLEVEL_OUTOF10: 3

## 2024-08-08 NOTE — PROGRESS NOTES
Physical Therapy Treatment    Patient Name: Edilson Cantu  MRN: [id number]  Today's Date: 8/8/2024  Time Calculation  Start Time: 1430  Stop Time: 1515  Time Calculation (min): 45 min  PT Therapeutic Procedures Time Entry  Neuromuscular Re-Education Time Entry: 23  Therapeutic Exercise Time Entry: 15  Therapeutic Activity Time Entry: 5,      Current Problem  1. Difficulty walking        2. Parkinson's disease with dyskinesia and fluctuating manifestations (Multi)              Insurance:  Payor: [employer] / Plan: [employer]ICARE / Product Type: *No Product type* /   Number of Treatments Authorized: 19/30 (Used 12 visits in 2023 this episode)  Certification Period Start Date: 05/22/24  Certification Period End Date: 08/20/24    Subjective : Falling daily. Performed HEP as instructed. Continues to consider need for increased intensity of effort. No changes in chair use at home to assist in transfer safety as instructed last session.   General  Reason for Referral: DIFFICULTY WALKING  Referred By: Dr Landeros  Past Medical History Relevant to Rehab: PD dx in 2020. Taking CD/LD every 3 hours.  General Comment: Patient reports fall while attempting to sit in the waiting room.  Another patient in the waiting area assisted him into the chair. Did not his head - denies pain or need for treatment. No change in his walking ability.    Performing HEP?: Yes    Precautions  Precautions  Precautions Comment: high fall risk, post op R TKR  Pain  Pain Assessment: 0-10  0-10 (Numeric) Pain Score: 3  Pain Location:  (Back and knee)L knee pain/ LBP    Objective   Some fatigue today - no testing as planned (MiniBest).   Treatments:    Therapeutic Exercise  Therapeutic Exercise Activity 1: Lars supine positioning  Therapeutic Exercise Activity 2: Lars shoulder press: 5 hold x 10  Therapeutic Exercise Activity 3: Toy head press: 5 hold x 10  Therapeutic Exercise Activity 4: Lars leg press: 5 hold x 10  Therapeutic Exercise  "Activity 5: Bridges: 5 hold x 10  Therapeutic Exercise Activity 6: Seated pelvic tilts: 2 x 10  Therapeutic Exercise Activity 7: Sit to stand: x 10    Balance/Neuromuscular Re-Education  Balance/Neuromuscular Re-Education Activity 1: Continued edu re: amplitude of effort during session - all transfers and repositioning/ holding postures  Balance/Neuromuscular Re-Education Activity 2: Shaping posture using tactile cues for anterior tilt, chest forward, head centered - 5# wts at feet to assist \"grounding\" patient's sitting position.  Balance/Neuromuscular Re-Education Activity 3: Walking 20 ft x 4 - cues for proper posture and walker use.     Therapeutic Activity  Therapeutic Activity 1: Forward stepping in sitting: x 10 - L/R  Therapeutic Activity 2: Modified floor to ceiling: 2 x 5    OP EDUCATION:   Re: safe sitting mechanics and \"set up\" for safe transfers.     Assessment:  PT Assessment  Assessment Comment: Session focus on role of posture in movement deficits and issued HEP for postural changes. Continued Edu re: need to increase amplitude of effort to improve movement quality - but within ranges he can currently control. Challenged holding functional postures for transfers and to reduce LBP. Will benefit from therapy to address movement disorder and to reduce falling.    Plan:  OP PT Plan  Treatment/Interventions: Education/ Instruction, Gait training, Manual therapy, Neuromuscular re-education, Self care/ home management, Taping techniques, Therapeutic activities, Therapeutic exercises  PT Frequency: 1 time per week  Duration: 8 weeks  Onset Date: 10/02/23  Certification Period Start Date: 05/22/24  Certification Period End Date: 08/20/24  Number of Treatments Authorized: 19/30 (Used 12 visits in 2023 this episode)  Continue to work on postural positions to avoid LBP and best position for transfers.   Attempt to add hip hinge to HEP.   Goals:  Active       PT Problem       PT Goal 1       Start:  02/05/24    " Expected End:  09/26/24       *STG MET*  LTG:   - Progress HEP and OMID within pt tolerance to goal attainment, including return to ADL/IADL and functional activities w/ less fall potential/improved safety. TO ADDRESS WITH PD SPECIFIC EXERCISE PROGRAM.  - Pt. will have improved balance ability as evidenced by increased SLS time of 10 sec for improved everyday balance and less fall potential  - Pt. will have safe, independent ambulation with safest/least restrictive assistive device for community distances and stairs/curbs with normalized gait pattern  - Pt. will have increased strength of at least 1/3 muscle grade in affected muscle groups- B hips  - Pt. will have at least 8 point improvement in functional test score- LEFS-as evidenced by self report of improved ability to perform every day activities w/ greater ease and less pain  - Pt will be supervised and a gait belt will be used if needed to assure safety in the clinic during treatment          Patient Stated Goal 1       Start:  02/05/24    Expected End:  09/26/24       Safely perform all daily activities w/o falling              Bryan Pena, PT

## 2024-08-15 ENCOUNTER — TREATMENT (OUTPATIENT)
Dept: PHYSICAL THERAPY | Facility: CLINIC | Age: 74
End: 2024-08-15
Payer: MEDICARE

## 2024-08-15 DIAGNOSIS — R26.2 DIFFICULTY WALKING: Primary | ICD-10-CM

## 2024-08-15 DIAGNOSIS — G20.B2 PARKINSON'S DISEASE WITH DYSKINESIA AND FLUCTUATING MANIFESTATIONS (MULTI): ICD-10-CM

## 2024-08-15 PROCEDURE — 97110 THERAPEUTIC EXERCISES: CPT | Mod: GP | Performed by: PHYSICAL THERAPIST

## 2024-08-15 PROCEDURE — 97530 THERAPEUTIC ACTIVITIES: CPT | Mod: GP | Performed by: PHYSICAL THERAPIST

## 2024-08-15 NOTE — PROGRESS NOTES
"  Physical Therapy Treatment/Progress Note    Patient Name: Edilson Cantu  MRN: [id number]  Today's Date: 8/15/2024  Time Calculation  Start Time: 1400  Stop Time: 1445  Time Calculation (min): 45 min  PT Therapeutic Procedures Time Entry  Therapeutic Exercise Time Entry: 12  Therapeutic Activity Time Entry: 30,      Current Problem  1. Difficulty walking        2. Parkinson's disease with dyskinesia and fluctuating manifestations (Multi)              Insurance:  Payor: [employer] / Plan: [employer] MEDICARE / Product Type: *No Product type* /   Number of Treatments Authorized: 19/30 (Used 12 visits in 2023 this episode)  Certification Period Start Date: 05/22/24  Certification Period End Date: 11/13/24    Subjective :   General  Reason for Referral: DIFFICULTY WALKING  Referred By: Dr Landeros  Past Medical History Relevant to Rehab: PD dx in 2020. Taking CD/LD every 3 hours.  General Comment: Arrives using rollator. Having a \"good day.\" No falls today. Fall yesterday without injury.    Performing HEP?: Yes    Precautions  Precautions  Precautions Comment: high fall risk, post op R TKR  Pain   No pain complaints today.     Objective     Outcome Measures:   Anticipatory:   1,0,0 = 1  Reactive postural control = 0  Sensory orientation: 2,0,1 = 3  Dynamic gait: 1,1,1,1 = 4 w/rollator.  Total: 8/28 = 28% (Cut off score for non-fallers is >69%)    Treatments:    Therapeutic Exercise  Therapeutic Exercise Activity 1: Lars supine positioning  Therapeutic Exercise Activity 2: Lars shoulder press: 5 hold x 10  Therapeutic Exercise Activity 3: Toy head press: 5 hold x 10  Therapeutic Exercise Activity 5: Bridges: 5 hold x 10  Therapeutic Exercise Activity 6: Standing glute set: 10 hold x 5        Therapeutic Activity  Therapeutic Activity 1: Rock and Reach: supported x 10 - B  Therapeutic Activity 2: Sit to stand x 10 (23\" surface)  Therapeutic Activity 3: BIG walking 6 to 10 ft with gait belt  Therapeutic Activity 4: " "MiniBest test: 8/28 with rollator.        OP EDUCATION:   Instructed to perform walking at home with rollator and walking without is for therapy only.     Assessment:  PT Assessment  Assessment Comment: Able to add MiniBEST test today. All testing with rollator at this time. Patient requiring correction for LOB in static standing. Typically \"leaks\" to R and falls in that direction. Patient able to correct standing balance when cued to press into floor w/ LLE. Continues to have postural and sensory orientation difficulty. Will benefit from continued therapy to address weakness and balance issues.    Plan:  OP PT Plan  Treatment/Interventions: Education/ Instruction, Gait training, Manual therapy, Neuromuscular re-education, Self care/ home management, Taping techniques, Therapeutic activities, Therapeutic exercises  PT Frequency: 1 time per week  Duration: 8 weeks  Onset Date: 10/02/23  Certification Period Start Date: 05/22/24  Certification Period End Date: 11/13/24  Number of Treatments Authorized: 19/30 (Used 12 visits in 2023 this episode)    Goals:  Active       PT Problem       PT Goal 1       Start:  02/05/24    Expected End:  11/13/24       *STG MET*  LTG:   - Progress HEP and OMID within pt tolerance to goal attainment, including return to ADL/IADL and functional activities w/ less fall potential/improved safety as measured by 5 point improvement in MiniBEST test:  TEST ISSUED TODAY - 8/28   - Pt. will have improved balance ability as evidenced by demonstraing static standing for 2 minutes on foam surface to demonstrate reduced fall risk.   - Pt. will have safe, independent ambulation with safest/least restrictive assistive device for community distances and stairs/curbs with normalized gait pattern.   - Pt. will have at least 8 point improvement in functional test score- MiniBEST test to > 15/28 to demonstrate reduced fall risk.  - Pt will be supervised and a gait belt will be used if needed to assure " safety in the clinic during treatment          Patient Stated Goal 1       Start:  02/05/24    Expected End:  11/13/24       Safely perform all daily activities without falling for 3-4 weeks by using appropriate device and movement strategies.               Bryan Pena, PT

## 2024-08-19 ENCOUNTER — TRANSCRIBE ORDERS (OUTPATIENT)
Dept: PHYSICAL THERAPY | Facility: CLINIC | Age: 74
End: 2024-08-19
Payer: MEDICARE

## 2024-08-19 DIAGNOSIS — R26.2 CANNOT WALK: Primary | ICD-10-CM

## 2024-08-22 ENCOUNTER — APPOINTMENT (OUTPATIENT)
Dept: NEUROLOGY | Facility: CLINIC | Age: 74
End: 2024-08-22
Payer: MEDICARE

## 2024-08-22 ENCOUNTER — TREATMENT (OUTPATIENT)
Dept: PHYSICAL THERAPY | Facility: CLINIC | Age: 74
End: 2024-08-22
Payer: MEDICARE

## 2024-08-22 DIAGNOSIS — R26.2 CANNOT WALK: ICD-10-CM

## 2024-08-22 PROCEDURE — 97110 THERAPEUTIC EXERCISES: CPT | Mod: GP | Performed by: PHYSICAL THERAPIST

## 2024-08-22 PROCEDURE — 97530 THERAPEUTIC ACTIVITIES: CPT | Mod: GP | Performed by: PHYSICAL THERAPIST

## 2024-08-22 NOTE — PROGRESS NOTES
"Physical Therapy Treatment    Patient Name: Edilson Cantu  MRN: [id number]  Today's Date: 8/22/2024    Current Problem  Problem List Items Addressed This Visit    None  Visit Diagnoses         Codes    Cannot walk     R26.2            Insurance:  Payor: [employer] / Plan: [employer] / Product Type: *No Product type* /   Number of Treatments Authorized: 20/30 (Used 12 visits in 2023 this episode)  Certification Period Start Date: 05/22/24  Certification Period End Date: 11/13/24    Subjective   General  Reason for Referral: DIFFICULTY WALKING  Referred By: Dr Landeros  Past Medical History Relevant to Rehab: PD dx in 2020. Taking CD/LD every 3 hours.  General Comment: Arrives using rollator. Having a \"good day.\" No falls today. Fall yesterday without injury.    Performing HEP?: Yes    Precautions   Falls  Pain   Some inguinal hernia pain today with sit to stand effort.     Objective   Treatments:    Therapeutic Exercise  Therapeutic Exercise Activity 1: Lars supine positioning  Therapeutic Exercise Activity 2: Lars shoulder press: 5 hold - 2 x 10  Therapeutic Exercise Activity 3: Toy head press: 5 hold- 2 x 10  Therapeutic Exercise Activity 4: Lars leg press: 5 hold x 10  Therapeutic Exercise Activity 5: Bridges: 5 hold x 10 - w/ ball adduction  Therapeutic Exercise Activity 6: Standing glute set: 10 hold x 5  Therapeutic Exercise Activity 7: Lars leg /aem lengthener: 5 holdx 5 -B        Therapeutic Activity  Therapeutic Activity 1: Rocking L/R 2 min x 2  Therapeutic Activity 2: TG: L 4 - 2 min x 2  Therapeutic Activity 3: Big walking: CGA 15 ft x 6  Therapeutic Activity 4: Static sitting at mirror/ standing at mirror: 2 min ea  Therapeutic Activity 5: Sit to stand with step at // bars x 5 L/R  Therapeutic Activity 6: 1/2 step in // bars - mirror - 2 x 2 min  Therapeutic Activity 7: Gait with rollator: 150 ft x 2    OP EDUCATION:   Education re: need to develop postural strength through sustained " "holds in exercises and posture - includes static sitting and standing performed today.     Assessment:  PT Assessment  Assessment Comment: Patient tolerated session well. Performing exercises for LE strengthenging - Total Gym. Requires close CGA for getting on/off TG, chair transfers and plinth. Patient demonstrating increased amplitude of effort with supine to sit and sit to supine transfers. Continues to \"leak\" R in standing and having difficulty correcting postural alignment. Required assist today x 2 to prevent falls. Will benefit from continued therapy to address weakness, balance, falls and transfers. Several contact cues to shape posture in standing and supine positions.     Plan:  OP PT Plan  Treatment/Interventions: Education/ Instruction, Gait training, Manual therapy, Neuromuscular re-education, Self care/ home management, Taping techniques, Therapeutic activities, Therapeutic exercises  PT Frequency: 1 time per week  Duration: 8 weeks  Onset Date: 10/02/23  Certification Period Start Date: 05/22/24  Certification Period End Date: 11/13/24  Number of Treatments Authorized: 20/30 (Used 12 visits in 2023 this episode)  Continue with both standing and supine exercises.   Goals:  Active       PT Problem       PT Goal 1       Start:  02/05/24    Expected End:  11/13/24       *STG MET*  LTG:   - Progress HEP and OMID within pt tolerance to goal attainment, including return to ADL/IADL and functional activities w/ less fall potential/improved safety as measured by 5 point improvement in MiniBEST test:  TEST ISSUED TODAY - 8/28   - Pt. will have improved balance ability as evidenced by demonstraing static standing for 2 minutes on foam surface to demonstrate reduced fall risk.   - Pt. will have safe, independent ambulation with safest/least restrictive assistive device for community distances and stairs/curbs with normalized gait pattern.   - Pt. will have at least 8 point improvement in functional test score- " MiniBEST test to > 15/28 to demonstrate reduced fall risk.  - Pt will be supervised and a gait belt will be used if needed to assure safety in the clinic during treatment          Patient Stated Goal 1       Start:  02/05/24    Expected End:  11/13/24       Safely perform all daily activities without falling for 3-4 weeks by using appropriate device and movement strategies.               Time Calculation  Start Time: 1445  Stop Time: 1545  Time Calculation (min): 60 min  PT Therapeutic Procedures Time Entry  Therapeutic Exercise Time Entry: 25  Therapeutic Activity Time Entry: 28,

## 2024-08-27 ENCOUNTER — TREATMENT (OUTPATIENT)
Dept: PHYSICAL THERAPY | Facility: CLINIC | Age: 74
End: 2024-08-27
Payer: MEDICARE

## 2024-08-27 ENCOUNTER — OFFICE VISIT (OUTPATIENT)
Dept: NEUROLOGY | Facility: HOSPITAL | Age: 74
End: 2024-08-27
Payer: MEDICARE

## 2024-08-27 VITALS
TEMPERATURE: 97.8 F | SYSTOLIC BLOOD PRESSURE: 149 MMHG | RESPIRATION RATE: 18 BRPM | DIASTOLIC BLOOD PRESSURE: 84 MMHG | HEART RATE: 63 BPM | HEIGHT: 69 IN | WEIGHT: 148 LBS | BODY MASS INDEX: 21.92 KG/M2

## 2024-08-27 DIAGNOSIS — R26.2 DIFFICULTY WALKING: ICD-10-CM

## 2024-08-27 DIAGNOSIS — G20.A1 PARKINSON'S DISEASE, UNSPECIFIED WHETHER DYSKINESIA PRESENT, UNSPECIFIED WHETHER MANIFESTATIONS FLUCTUATE (MULTI): Primary | ICD-10-CM

## 2024-08-27 DIAGNOSIS — R26.2 CANNOT WALK: ICD-10-CM

## 2024-08-27 PROCEDURE — 3079F DIAST BP 80-89 MM HG: CPT | Performed by: PSYCHIATRY & NEUROLOGY

## 2024-08-27 PROCEDURE — 1157F ADVNC CARE PLAN IN RCRD: CPT | Performed by: PSYCHIATRY & NEUROLOGY

## 2024-08-27 PROCEDURE — 1036F TOBACCO NON-USER: CPT | Performed by: PSYCHIATRY & NEUROLOGY

## 2024-08-27 PROCEDURE — 3008F BODY MASS INDEX DOCD: CPT | Performed by: PSYCHIATRY & NEUROLOGY

## 2024-08-27 PROCEDURE — 97530 THERAPEUTIC ACTIVITIES: CPT | Mod: GP | Performed by: PHYSICAL THERAPIST

## 2024-08-27 PROCEDURE — 99215 OFFICE O/P EST HI 40 MIN: CPT | Performed by: PSYCHIATRY & NEUROLOGY

## 2024-08-27 PROCEDURE — 3077F SYST BP >= 140 MM HG: CPT | Performed by: PSYCHIATRY & NEUROLOGY

## 2024-08-27 PROCEDURE — 97110 THERAPEUTIC EXERCISES: CPT | Mod: GP | Performed by: PHYSICAL THERAPIST

## 2024-08-27 PROCEDURE — 99215 OFFICE O/P EST HI 40 MIN: CPT | Mod: GC | Performed by: PSYCHIATRY & NEUROLOGY

## 2024-08-27 PROCEDURE — 1123F ACP DISCUSS/DSCN MKR DOCD: CPT | Performed by: PSYCHIATRY & NEUROLOGY

## 2024-08-27 NOTE — PATIENT INSTRUCTIONS
Dear  Pepe,    You were seen in Neurology Clinic today with Dr. Landeros regarding follow up of your Parkinson's Disease. We discussed your symptoms.    - Please see the surgeon that you were referred to  - Continue working with your physical therapist, specifically mention gait freezing and work with them on this concern. When you feel freezing occurring, you can try marching, this may help.  - You can try to use a U-Step walker, this may improve your freezing, speak with your physical therapist about this  - Document timing of your Sinemet and falls  - Return to clinic in 6-8 months    Thank you for allowing us to care for you,   Neurology Clinic

## 2024-08-27 NOTE — PROGRESS NOTES
Physical Therapy Treatment    Patient Name: Edilson Cantu  MRN: [id number]  Today's Date: 2024    Current Problem  Problem List Items Addressed This Visit             ICD-10-CM    Difficulty walking R26.2     Other Visit Diagnoses         Codes    Cannot walk     R26.2            Insurance:  Payor: [employer]ARE / Plan: [employer]ICARE / Product Type: *No Product type* /   Number of Treatments Authorized:  (Used 12 visits in  this episode)  Certification Period Start Date: 24  Certification Period End Date: 24    Subjective : States he did fine after last session. No increased pain. Reports falling 1 x today in kitchen while moving items from counter to counter. Saw neurologist who is recommending U-walker.   General  Reason for Referral: DIFFICULTY WALKING  Referred By: Dr Landeros  Past Medical History Relevant to Rehab: PD dx in . Taking CD/LD every 3 hours.  General Comment: Patient continues to experience falls daily. Severe LE weakness - likely from spinal issues as well as dyskinesia from PD.    Performing HEP?: Yes    Precautions   Falls  Pain   No significant knee or back pain complaints today.     Objective     Treatments:    Therapeutic Exercise  Therapeutic Exercise Activity 2: Lars shoulder press: 5 hold -  x 10  Therapeutic Exercise Activity 3: Toy head press: 5 hold-  x 10  Therapeutic Exercise Activity 5: Bridges: 5 hold x 10  Therapeutic Exercise Activity 6: Standing glute set: 10 hold x 5    Therapeutic Activity  Therapeutic Activity 1: Rocking L/R : 2 min  Therapeutic Activity 2: TG: L4 - 2 min  Therapeutic Activity 3: Big Walking: x 8-  15 ft with CGA - x 2 with FWW. (Cues for specific target to avoid freezing episodes when making small turns for sitting.)  Therapeutic Activity 4: Static Standin min  Therapeutic Activity 5: Seated ball toss / perched sitting ball toss    OP EDUCATION:    Edu re: overcoming freezing episodes when transitioning from stand to  "sitting. Able to anticipate \"sudden weakness\" and ID this as pending freezing - to \"break out of ice\" on his body to increase intensity of effort for sitting.     Assessment:  PT Assessment  Assessment Comment: Patient tolerated session well. Performing exercises for LE strengthenging - Total Gym. Requires close CGA for getting on/off TG, chair transfers and plinth. Patient demonstrating increased amplitude of effort with supine to sit and sit to supine transfers. Continues to \"leak\" R in standing and having difficulty correcting postural alignment. Required assist today x 1 to prevent falls. Will benefit from continued therapy to address weakness, balance, falls and transfers.    Plan:  OP PT Plan  Treatment/Interventions: Education/ Instruction, Gait training, Manual therapy, Neuromuscular re-education, Self care/ home management, Taping techniques, Therapeutic activities, Therapeutic exercises  PT Frequency: 1 time per week  Duration: 8 weeks  Onset Date: 10/02/23  Certification Period Start Date: 05/22/24  Certification Period End Date: 11/13/24  Number of Treatments Authorized: 21/30 (Used 12 visits in 2023 this episode)    Goals:  Active       PT Problem       PT Goal 1       Start:  02/05/24    Expected End:  11/13/24       *STG MET*  LTG:   - Progress HEP and OMID within pt tolerance to goal attainment, including return to ADL/IADL and functional activities w/ less fall potential/improved safety as measured by 5 point improvement in MiniBEST test:  TEST ISSUED TODAY - 8/28   - Pt. will have improved balance ability as evidenced by demonstraing static standing for 2 minutes on foam surface to demonstrate reduced fall risk.   - Pt. will have safe, independent ambulation with safest/least restrictive assistive device for community distances and stairs/curbs with normalized gait pattern.   - Pt. will have at least 8 point improvement in functional test score- MiniBEST test to > 15/28 to demonstrate reduced fall " risk.  - Pt will be supervised and a gait belt will be used if needed to assure safety in the clinic during treatment          Patient Stated Goal 1       Start:  02/05/24    Expected End:  11/13/24       Safely perform all daily activities without falling for 3-4 weeks by using appropriate device and movement strategies.               Time Calculation  Start Time: 1645  Stop Time: 1730  Time Calculation (min): 45 min  PT Therapeutic Procedures Time Entry  Therapeutic Exercise Time Entry: 15  Therapeutic Activity Time Entry: 25,

## 2024-08-27 NOTE — PROGRESS NOTES
"Subjective     Chief Complaint: 6 month FUV PD    History of Present Illness:   Mr. Cantu is a 69 YO white man with PD, HTN, and lumbar canal stenosis s/p surgery who presents for follow-up of PD.     Interval Hx:  Presented to PCP in May for sharp radiating pain in R leg believed to be sciatica. PCP prescribed Gabapentin to which he had some relief from. Then met with Neurology NP on 5/8 since he was experiencing sudden onset issues with balance and strength, falling multiple times per day. Reported that he felt sx were progressing rapidly. Denied any incontinence at that time. MRI lumbar spine was completed showing severe canal stenosis at L4-5 2/2 8mm grade 1 anterolisthesis progressed from last scan. He was advised to go to ED for NS evaluation but did not go, instead went to [place] for pre planned business trip. He received PT and it was manageable. At that time he reports he woke up and lost control of muscles of L big toe and R hamstring.    The falls happen in small spaces where he is taking small steps and feels that he freezes. Doesn't report an association with timing of Sinemet. Has missed a few doses and has felt worsening 20% of these times. Overall taking Sinemet as prescribed.    Today he reports his biggest problem has been balance. Still falling multiple times per day. Reports \"he is standing on ice and suddenly feels that he is leaning to one side and then falls.\" Is still able to walk using his walker, but certain days he is unable to walk as far as he could. Has been doing physical therapy, now they have a different therapist who is an expert in PD. Now also reports dull constant ache in his L back now, similar to pain he had in R. When he moves in certain ways he'll have sharp stabbing pain from buttocks to thigh.    Motor Sx:  Tremor: Very slight tremor but not bothersome  Bradykinesia: Slower with ADLs and movement   Rigidity: Minimal today.  Postural instability: Has been a problem more " recently. Working with a new PT. He is able to walk about as far as he was able to in January. Lumbar stenosis is worse and was referred to surgeon, this is likely contributing to instability and falls.     NMSx:  Constipation: BM q2-3 days. Uses a bowel regimen   OH: Denies  Urinary sx: Denies  Cognition: Pretty good. Memory. No hallucinations.  Hyposmia: Fine  RSBD: None  Mood: Generally pretty good.     Review of Systems:   - 14 point ROS reviewed and otherwise negative except as stated above     Past Medical/Surgical History:  - PD  - Chronic low back pain   - Lumbar stenosis s/p L3-5 laminectomy with bl foraminotomies in 2018 - recent MRI in 5/24 with worsening  - HTN  - Knee arthroscopy     Home Meds:  - C/L 25/100 1.5 tabs 8:30, 11:30, 2:30, 5:30  - Amlodipine 5mg daily  - HCTZ 25mg daily  - Colace 100mg BID PRN  - Miralax daily PRN  - Meloxicam PRN      Allergies: NKDA     Social History:   - Living situation: Lives with wife  - Baseline function: Ambulates with a cane for the last 5 years   - 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   - Tobacco use: Denies  - Alcohol use: 2 glasses of wine per day  - Illicit drug use: Denies     Family History:   - No FHx of PD or neurological dz    Patient Active Problem List   Diagnosis    Abdominal hernia    HTN (hypertension)    BPH (benign prostatic hyperplasia)    Difficulty walking    Knee osteoarthritis    Left foot drop    Acute postoperative pain of right knee    Arthritis    Lumbosacral spinal stenosis    Muscle weakness-general    Parkinson's disease (Multi)    Axonal polyneuropathy    Postlaminectomy syndrome, lumbar region    Tremor    Osteoarthritis of right knee, unspecified osteoarthritis type    Depression      Past Medical History:   Diagnosis Date    Parkinsons (Multi)       Past Surgical History:   Procedure Laterality Date    KNEE ARTHROSCOPY W/ DEBRIDEMENT  08/24/2017    Knee Arthroscopy (Therapeutic)    OTHER SURGICAL HISTORY   09/10/2019    Lower back surgery      Social History     Socioeconomic History    Marital status:      Spouse name: Not on file    Number of children: Not on file    Years of education: Not on file    Highest education level: Not on file   Occupational History    Not on file   Tobacco Use    Smoking status: Never     Passive exposure: Never    Smokeless tobacco: Never   Substance and Sexual Activity    Alcohol use: Defer    Drug use: Never    Sexual activity: Not on file   Other Topics Concern    Not on file   Social History Narrative    Not on file     Social Determinants of Health     Financial Resource Strain: Low Risk  (10/3/2023)    Overall Financial Resource Strain (CARDIA)     Difficulty of Paying Living Expenses: Not very hard   Food Insecurity: No Food Insecurity (10/3/2023)    Hunger Vital Sign     Worried About Running Out of Food in the Last Year: Never true     Ran Out of Food in the Last Year: Never true   Transportation Needs: No Transportation Needs (11/2/2023)    OASIS : Transportation     Lack of Transportation (Medical): No     Lack of Transportation (Non-Medical): No     Patient Unable or Declines to Respond: No   Physical Activity: Inactive (10/2/2023)    Exercise Vital Sign     Days of Exercise per Week: 0 days     Minutes of Exercise per Session: 0 min   Stress: Patient Declined (10/2/2023)    [employer] - Occupational Stress Questionnaire     Feeling of Stress : Patient declined   Social Connections: Feeling Socially Integrated (11/2/2023)    OASIS : Social Isolation     Frequency of experiencing loneliness or isolation: Never   Intimate Partner Violence: Not on file   Housing Stability: Unknown (10/3/2023)    Housing Stability Vital Sign     Unable to Pay for Housing in the Last Year: No     Number of Places Lived in the Last Year: Not on file     Unstable Housing in the Last Year: No      Family History   Problem Relation Name Age of Onset     "Other (Cardiac disorder) Father          Vitals:    08/27/24 1329   BP: 149/84   Pulse: 63   Resp: 18   Temp: 36.6 °C (97.8 °F)   Weight: 67.1 kg (148 lb)   Height: 1.753 m (5' 9\")     Objective   GENERAL APPEARANCE:  No distress, alert, interactive and cooperative.     MENTAL STATE:   Orientation was normal to time, place and person.    CRANIAL NERVES:   CN 2   Visual fields full to confrontation.   CN 3, 4, 6   Pupils round, 4 mm in diameter, equally reactive to light. Lids symmetric; no ptosis. EOMs normal alignment, full range with normal saccades, pursuit and convergence. No nystagmus.   CN 5   Facial sensation intact bilaterally.   CN 7   Normal and symmetric facial strength. Nasolabial folds symmetric.   CN 8   Hearing intact to conversation.  CN 9/10  Palate elevates symmetrically.   CN 11   Normal strength of shoulder shrug and neck turning.   CN 12   Tongue midline, with normal bulk and strength; no fasciculations.     MOTOR:   Muscle bulk and tone were normal in both upper and lower extremities.   No fasciculations, tremor or other abnormal movements were present. No rigidity noted.  No drift.                        R          L  Deltoids       5          5  Biceps          5          5  Triceps          5          5    Hip Flex        5          4  Knee Flex      5          4  Knee Ext        5          4  Dorsiflex        5          4  Plantarflex      5          4    SENSORY:   In both upper and lower extremities, sensation was intact to light touch, temperature and vibration.    COORDINATION:    In both upper extremities, finger-nose-finger was intact without dysmetria or overshoot.     GAIT:   Walks with walker, hesitant gait with short steps while turning. Mild foot drop in L foot.      Hemoglobin A1C   Date Value Ref Range Status   09/18/2019 5.3 % Final     Comment:          Diagnosis of Diabetes-Adults   Non-Diabetic: < or = 5.6%   Increased risk for developing diabetes: 5.7-6.4%   Diagnostic of " diabetes: > or = 6.5%  .       Monitoring of Diabetes                Age (y)     Therapeutic Goal (%)   Adults:          >18           <7.0   Pediatrics:    13-18           <7.5                   7-12           <8.0                   0- 6            7.5-8.5   American Diabetes Association. Diabetes Care 33(S1), Jan 2010.       Estimated Average Glucose   Date Value Ref Range Status   09/18/2019 105 MG/DL Final     TSH   Date Value Ref Range Status   03/21/2023 1.92 0.44 - 3.98 mIU/L Final     Comment:      TSH testing is performed using different testing    methodology at [facility] than at other    [facility] hospitals. Direct result comparisons should    only be made within the same method.     Folate   Date Value Ref Range Status   09/18/2019 20.3 >5.0 ng/mL Final     Comment:      Patients receiving more than 5 mg/day of biotin may have interference   in test results.  A sample should be taken no sooner than eight hours   after  previous dose. Contact the testing laboratory for additional   information.          MR lumbar spine wo IV contrast 05/09/2024    INDICATION:  Signs/Symptoms:L foot/toe weakness, R low back pain radiating to anterior thigh    Impression  Right paracentral disc extrusion at L5-S1 compressing the traversing right S1 nerve root, new from previous MRI 01/17/2020.    Severe canal stenosis at L4-5 secondary to 8 mm grade 1 anterolisthesis resulting in crowding of the cauda equina nerve roots, progressed from previous MRI.  Multilevel high-grade foraminal stenosis as detailed.  Status post L2 through L4 laminectomy. Levoconvex scoliosis centered at L3.    Assessment/Plan   Mr. Cantu is a 71 YO man with lumbar stenosis, OA of the knees and PD who presented in 2020 with freezing of gait, poor balance, RUE intermittent rest tremor, micrographia, and constipation. He denies dream enactment and anosmia. His virtual exam is positive for mild hypomimia, bradykinesia with rapid repetitive  movement L>R, stooping, absent arm swing bilaterally, and shuffling gait. Most likely idiopathic Parkinson's disease overlapping with lumbar canal stenosis and nutritional neuropathy. Will try him on sinemet and will refer him to PT. Follow up after 3 months.      12/15/2020: He was taking sinemet at 8 AM, 4 PM, and 9 PM. He did not notice any beneift nor side effects. He did some PT then stopped because of the second wave of COVID. He reports new severe right quad pain and is planning to talk to Dr. Reich about it. I will increase his sinemet dose gradually to 1.5 tabs four times daily and adjust timing.      03/08/2021: No benefit or side effect from the higher dose of sinemet. He wants to go back to 1 tablet three times daily, which is reasonable. He hasn't been able to exercise because of his quad pain. He is getting back steroid injections for it.      06/14/2021: reports some benefit from sinemet on walking without side effects. First in-person exam shows a UPDRS of 28 two hours after sinemet. Will increase the dose.      02/10/2022: can't tell if the higher dose of sinemet helped. No side effects. balance is worsening but responds to PT. UPDRS improved five points on today's on-exam compared to last time. will continue the same sinemet dose and add miralax for worsening constipation.      9/8/2022: UPDRS 33 from 23 but he was off and just took his dose of sinemet. After 30 minutes into dose his bradykinesia was improved on repeat exam. Overall seems to be somewhat responsive to sinemet, and it is possible he does not report significant improvement due to his confounding comorbidities of lumbar stenosis and OA. Will try to raise sinemet further and assess response. No red flags for atypical parkinsonism at this time     06/08/2023: The latest increase in dose did help his symptoms to some degree especially feeling less jittery but it did not help his balance and he continues with very frequent falls and near  falls. found PT helpful but can't participate fully because of his bad right knee. He will have knee replacement soon and was wondering if PD can have implications to his knee surgery. I discussed the possibility of transient worsening of PD symptoms around surgery time and the importance of not changing sinemet dose around the time of surgery.      1/18/2024: Largely stable. Falls still a problem, mostly backwards. Had knee replacement without issues and his pain and walking improved some. Dream enactment is getting somewhat better. Has mild nighttime confusion if he wakes up from sleep. Doing exercise. Will keep sinemet dose the same.     5/8/2024: Presents today w/ sooner apt for c/o LBP with R sided weakness and radiation to anterior thigh, L great toe weakness, increase in falls (multiple falls a day), feels overall decline in PD SX. He denies saddle anesthesia/paresthesia, has had numbness remotely in his feet but not currently an issue, denies a fall causing the pain. On exam he has L foot eversion weakness and L great toe weakness, no weakness in RLE. UPDRS was 33 last visit and today 70mins after meds is 52. Will obtain STAT imaging for possible lumbar radiculopathy with LLE weakness/back pain, advised to f/u with PCP for concern for suddenly worsening parkinsonism (usually d/t another cause), after that would increase Sinemet as he appears under treated and though Sinemet does not directly help balance may help rigidity and bradykinesia directly affecting his balance. Also check for OH.     8/27/2024:  Worsening falls, multiple times per day. Described as freezing episodes especially in tight spaces. Not related to On or Off Carbidopa, not related to dizziness/lightheadedness. Thus will not change dosing of Sinemet. Recommended USTEP walker (will borrow from a friend) and continuation of PT. Had a recent fall and MRI lumbar showed new disc prolapse and worsening spinal canal stenosis. Exam show mild  partial left foot drop that is new since. His PCP already referred him to spine surgery and I recommended discussing AFO with his therapist.      PLAN:  - Continue to work with your physical therapist, specifically in regards to gait freezing. You can try marching in place or while turing in small places.  - Continue fast-paced exercise as tolerated  - Trial U-Step walker  - Continue same dosing of Sinemt     - Come back to see us in 6 to 8 months.     Patient was discussed with Dr. Landeros, who agrees with plan.    Zev Ramos MD  PGY2 Neurology

## 2024-09-03 ENCOUNTER — TREATMENT (OUTPATIENT)
Dept: PHYSICAL THERAPY | Facility: CLINIC | Age: 74
End: 2024-09-03
Payer: MEDICARE

## 2024-09-03 DIAGNOSIS — R26.2 CANNOT WALK: ICD-10-CM

## 2024-09-03 DIAGNOSIS — R26.2 DIFFICULTY WALKING: ICD-10-CM

## 2024-09-03 PROCEDURE — 97530 THERAPEUTIC ACTIVITIES: CPT | Mod: GP | Performed by: PHYSICAL THERAPIST

## 2024-09-03 NOTE — PROGRESS NOTES
"Physical Therapy Treatment    Patient Name: Edilson Cantu  MRN: [id number]  Today's Date: 9/3/2024    Current Problem  Problem List Items Addressed This Visit             ICD-10-CM    Difficulty walking R26.2     Other Visit Diagnoses         Codes    Cannot walk     R26.2            Insurance:  Payor: [employer] / Plan: [employer] / Product Type: *No Product type* /   Number of Treatments Authorized: 22/30 (Used 12 visits in 2023 this episode)  Certification Period Start Date: 05/22/24  Certification Period End Date: 11/13/24    Subjective : Reports 2 falls today. Most often falls to the Right - in the kitchen with a tile floor.   General  Reason for Referral: DIFFICULTY WALKING  Referred By: Dr Landeros  Past Medical History Relevant to Rehab: PD dx in 2020. Taking CD/LD every 3 hours.  General Comment: Patient continues to experience falls daily. Severe LE weakness - likely from spinal issues as well as dyskinesia from PD.    Performing HEP?: Yes    Precautions   Falls  Pain   \"Just sore\"    Objective : Gait belt on for all upright exercises.   Treatments:  Walking with strap as AFO in department - improved ability to clear L foot.   Therapeutic Activity  Therapeutic Activity 1: Box stepping x 5 minutes (in/out of \"tape box\" all planes)  Therapeutic Activity 2: Ladder walk iwith rollator 15' x 6 - CGA  Therapeutic Activity 3: Sit to stand:: 2 x 1-  Therapeutic Activity 4: Foot placement / planning strategies for moving into small space with controlled increased amplitude of effort for accurate foot placement.    OP EDUCATION:   Re: need of strong on/off signal when attempting to break out of freezing episode - typically when attempting small turns or thresholds.  Cues to make \"looping turns.\"     Assessment:  PT Assessment  Assessment Comment: Patient tolerated session well. Continues to suffer falls at home despite train for movement in small spaces and techniques to use higher intensity of effort to " improve movement quality. Session focus on trial AFO in clinic. Patient reporting improved control of L foot/ankle when using wrap as AFO.  Session also focused on moving in small spaces with practice moving into and out of space for sitting. Will benefit from continued therapy. Concerns about maxing out PT benefit if client has injury. To discuss at next visit.    Plan:  OP PT Plan  PT Frequency: 1 time per week  Duration: 8 weeks  Onset Date: 10/02/23  Certification Period Start Date: 05/22/24  Certification Period End Date: 11/13/24  Number of Treatments Authorized: 22/30 (Used 12 visits in 2023 this episode)    Goals:  Active       PT Problem       PT Goal 1       Start:  02/05/24    Expected End:  11/13/24       *STG MET*  LTG:   - Progress HEP and OMID within pt tolerance to goal attainment, including return to ADL/IADL and functional activities w/ less fall potential/improved safety as measured by 5 point improvement in MiniBEST test:  TEST ISSUED TODAY - 8/28   - Pt. will have improved balance ability as evidenced by demonstraing static standing for 2 minutes on foam surface to demonstrate reduced fall risk.   - Pt. will have safe, independent ambulation with safest/least restrictive assistive device for community distances and stairs/curbs with normalized gait pattern.   - Pt. will have at least 8 point improvement in functional test score- MiniBEST test to > 15/28 to demonstrate reduced fall risk.  - Pt will be supervised and a gait belt will be used if needed to assure safety in the clinic during treatment          Patient Stated Goal 1       Start:  02/05/24    Expected End:  11/13/24       Safely perform all daily activities without falling for 3-4 weeks by using appropriate device and movement strategies.               Time Calculation  Start Time: 1445  Stop Time: 1530  Time Calculation (min): 45 min  PT Therapeutic Procedures Time Entry  Therapeutic Activity Time Entry: 40,

## 2024-09-11 ENCOUNTER — TREATMENT (OUTPATIENT)
Dept: PHYSICAL THERAPY | Facility: CLINIC | Age: 74
End: 2024-09-11
Payer: MEDICARE

## 2024-09-11 DIAGNOSIS — R26.2 CANNOT WALK: ICD-10-CM

## 2024-09-11 PROCEDURE — 97530 THERAPEUTIC ACTIVITIES: CPT | Mod: GP | Performed by: PHYSICAL THERAPIST

## 2024-09-11 PROCEDURE — 97110 THERAPEUTIC EXERCISES: CPT | Mod: GP | Performed by: PHYSICAL THERAPIST

## 2024-09-11 NOTE — PROGRESS NOTES
Physical Therapy Treatment    Patient Name: Edilson Cantu  MRN: [id number]  Today's Date: 9/11/2024    Current Problem  Problem List Items Addressed This Visit    None  Visit Diagnoses         Codes    Cannot walk     R26.2            Insurance:  Payor: [employer] / Plan: [employer] / Product Type: *No Product type* /   Number of Treatments Authorized: 5/20 - [place]  Certification Period Start Date: 05/22/24  Certification Period End Date: 11/13/24    Subjective :   General  Reason for Referral: DIFFICULTY WALKING  Referred By: Dr Landeros  Past Medical History Relevant to Rehab: PD dx in 2020. Taking CD/LD every 3 hours.  General Comment: Patient continues to experience falls daily. Severe LE weakness - likely from spinal issues as well as dyskinesia from PD.    Performing HEP?: Yes    Precautions  Precautions  Precautions Comment: high fall risk, post op R TKR  Pain   Denies significant pain    Objective   Treatments:    Therapeutic Exercise  Therapeutic Exercise Activity 1: TG: Level 4 - 3 x 20  Therapeutic Exercise Activity 2: Step and hold at // bars: x 10 - B        Therapeutic Activity  Therapeutic Activity 1: Gait with rollator: 150 ft x 3  Therapeutic Activity 2: Box stepping: L/R forward  Therapeutic Activity 3: Sit to stand: x 5  Therapeutic Activity 4: Posture/static standing        OP EDUCATION:   Patient education re: plan for continued therapy to address falls, movement initiation, AFO benefit. To contact Md re: AFO to reduce scuffing of feet when walking and especially when fatigued.     Assessment:  PT Assessment  Assessment Comment: Patient tolerated session well. Continues to suffer falls at home despite training for movement in small spaces and techniques to use higher intensity of effort to improve movement quality. Session focus on stepping strategies  especially side steps to avoid falling. Edu re: benefits of AFO and to speak to Md about writing Rx for appropriate brace.  Will benefit  from continued therapy.    Plan:  OP PT Plan  PT Frequency: 1 time per week  Duration: 8 weeks  Onset Date: 10/02/23  Certification Period Start Date: 05/22/24  Certification Period End Date: 11/13/24  Number of Treatments Authorized: 5/20 - [place]    Goals:  Active       PT Problem       PT Goal 1       Start:  02/05/24    Expected End:  11/13/24       *STG MET*  LTG:   - Progress HEP and OMID within pt tolerance to goal attainment, including return to ADL/IADL and functional activities w/ less fall potential/improved safety as measured by 5 point improvement in MiniBEST test:  TEST ISSUED TODAY - 8/28   - Pt. will have improved balance ability as evidenced by demonstraing static standing for 2 minutes on foam surface to demonstrate reduced fall risk.   - Pt. will have safe, independent ambulation with safest/least restrictive assistive device for community distances and stairs/curbs with normalized gait pattern.   - Pt. will have at least 8 point improvement in functional test score- MiniBEST test to > 15/28 to demonstrate reduced fall risk.  - Pt will be supervised and a gait belt will be used if needed to assure safety in the clinic during treatment          Patient Stated Goal 1       Start:  02/05/24    Expected End:  11/13/24       Safely perform all daily activities without falling for 3-4 weeks by using appropriate device and movement strategies.               Time Calculation  Start Time: 1315  Stop Time: 1400  Time Calculation (min): 45 min  PT Therapeutic Procedures Time Entry  Therapeutic Exercise Time Entry: 13  Therapeutic Activity Time Entry: 25,

## 2024-09-16 ENCOUNTER — TREATMENT (OUTPATIENT)
Dept: PHYSICAL THERAPY | Facility: CLINIC | Age: 74
End: 2024-09-16
Payer: MEDICARE

## 2024-09-16 DIAGNOSIS — R26.2 CANNOT WALK: ICD-10-CM

## 2024-09-16 PROCEDURE — 97530 THERAPEUTIC ACTIVITIES: CPT | Mod: GP | Performed by: PHYSICAL THERAPIST

## 2024-09-16 NOTE — PROGRESS NOTES
"Physical Therapy Treatment    Patient Name: Edilson Cantu  MRN: [id number]  Today's Date: 9/16/2024    Current Problem  Problem List Items Addressed This Visit    None  Visit Diagnoses         Codes    Cannot walk     R26.2            Insurance:  Payor: [employer] / Plan: [employer] / Product Type: *No Product type* /   Number of Treatments Authorized: 6/20 - [place]  Certification Period Start Date: 05/22/24  Certification Period End Date: 11/13/24    Subjective   General  Reason for Referral: DIFFICULTY WALKING  Referred By: Dr Landeros  Past Medical History Relevant to Rehab: PD dx in 2020. Taking CD/LD every 3 hours.  General Comment: Continues to experience fall, but none yet today. \"Having a pretty good day.\"    Performing HEP?: Yes    Precautions  Precautions  Precautions Comment: high fall risk, post op R TKR  Pain   Denies    Objective     Therapeutic Activity  Therapeutic Activity 1: Gait with rollator : 150 ft x 2 with multitask of story telling.  Therapeutic Activity 2: Box stepping: L/R /Fwd x 10 ea. -gait belt  Therapeutic Activity 3: Functional training for small turns with transiton from walker to cane in small space to mimic high fall space at home.  Therapeutic Activity 4: Posture/static standing with multi-task.  Therapeutic Activity 5: TG. Level 5 - 2 minutes x 2    OP EDUCATION:    Edu re: moving from a \"stable base.\"     Assessment:  PT Assessment  Assessment Comment: Patient tolerated session well. Continues to suffer falls at home despite training for movement in small spaces and techniques to use higher intensity of effort to improve movement quality. Session focus on negociating a corner in his home that is a high fall zone. Edu re: \" moving from a stable base. Continued stepping strategies  especially side steps to avoid falling. Edu re: benefits of AFO and to speak to Md about writing Rx for appropriate brace.  Will benefit from continued therapy.    Plan:  OP PT Plan  PT " Frequency: 1 time per week  Duration: 8 weeks  Onset Date: 10/02/23  Certification Period Start Date: 05/22/24  Certification Period End Date: 11/13/24  Number of Treatments Authorized: 6/20 - [place]    Goals:  Active       PT Problem       PT Goal 1       Start:  02/05/24    Expected End:  11/13/24       *STG MET*  LTG:   - Progress HEP and OMID within pt tolerance to goal attainment, including return to ADL/IADL and functional activities w/ less fall potential/improved safety as measured by 5 point improvement in MiniBEST test:  TEST ISSUED TODAY - 8/28   - Pt. will have improved balance ability as evidenced by demonstraing static standing for 2 minutes on foam surface to demonstrate reduced fall risk.   - Pt. will have safe, independent ambulation with safest/least restrictive assistive device for community distances and stairs/curbs with normalized gait pattern.   - Pt. will have at least 8 point improvement in functional test score- MiniBEST test to > 15/28 to demonstrate reduced fall risk.  - Pt will be supervised and a gait belt will be used if needed to assure safety in the clinic during treatment          Patient Stated Goal 1       Start:  02/05/24    Expected End:  11/13/24       Safely perform all daily activities without falling for 3-4 weeks by using appropriate device and movement strategies.               Time Calculation  Start Time: 1200  Stop Time: 1245  Time Calculation (min): 45 min  PT Therapeutic Procedures Time Entry  Therapeutic Activity Time Entry: 40,

## 2024-09-26 ENCOUNTER — LAB (OUTPATIENT)
Dept: LAB | Facility: LAB | Age: 74
End: 2024-09-26
Payer: MEDICARE

## 2024-09-26 DIAGNOSIS — Z00.00 HEALTH CARE MAINTENANCE: ICD-10-CM

## 2024-09-26 DIAGNOSIS — Z12.5 SCREENING FOR PROSTATE CANCER: ICD-10-CM

## 2024-09-26 LAB
CHOLEST SERPL-MCNC: 177 MG/DL (ref 0–199)
CHOLESTEROL/HDL RATIO: 2.8
HDLC SERPL-MCNC: 63.7 MG/DL
LDLC SERPL CALC-MCNC: 103 MG/DL
NON HDL CHOLESTEROL: 113 MG/DL (ref 0–149)
PSA SERPL-MCNC: 0.66 NG/ML
TRIGL SERPL-MCNC: 51 MG/DL (ref 0–149)
VLDL: 10 MG/DL (ref 0–40)

## 2024-09-26 PROCEDURE — 36415 COLL VENOUS BLD VENIPUNCTURE: CPT

## 2024-09-30 ENCOUNTER — TREATMENT (OUTPATIENT)
Dept: PHYSICAL THERAPY | Facility: CLINIC | Age: 74
End: 2024-09-30
Payer: MEDICARE

## 2024-09-30 DIAGNOSIS — R26.2 CANNOT WALK: ICD-10-CM

## 2024-09-30 PROCEDURE — 97110 THERAPEUTIC EXERCISES: CPT | Mod: GP | Performed by: PHYSICAL THERAPIST

## 2024-09-30 NOTE — PROGRESS NOTES
Physical Therapy Treatment    Patient Name: Edilson Cantu  MRN: [id number]  Today's Date: 9/30/2024    Current Problem  Problem List Items Addressed This Visit    None  Visit Diagnoses         Codes    Cannot walk     R26.2            Insurance:  Payor: [employer] / Plan: [employer]ARE / Product Type: *No Product type* /   Number of Treatments Authorized: 7/20 - [place]  Certification Period Start Date: 05/22/24  Certification Period End Date: 11/13/24    Subjective : Denies pain.   General  Reason for Referral: DIFFICULTY WALKING  Referred By: Dr Landeros  Past Medical History Relevant to Rehab: PD dx in 2020. Taking CD/LD every 3 hours.  General Comment: States he is standing for longer periods of time. States he is falling less. Attempting to use cane more often at home. Spouse believes this puts him at greater risk for falls. LOB when getting out of car today that resulted in a fall - no injury.    Performing HEP?: Yes    Precautions  Precautions  Precautions Comment: high fall risk, post op R TKR  Pain   Denies    Objective   Treatments:    Therapeutic Exercise  Therapeutic Exercise Activity 1: Lars: decompression x 2 min  Therapeutic Exercise Activity 2: Lars shoulder press 5 x 5 hold  Therapeutic Exercise Activity 3: Lars leg lengthener:  Therapeutic Exercise Activity 4: Lars leg press: 5 x 5 hold  Therapeutic Exercise Activity 5: TG: level 4 - 2 x 20  Therapeutic Exercise Activity 6: Step and hold at // bars  Therapeutic Exercise Activity 7: Reclined ball squeeze: 5 hold x 10  Therapeutic Exercise Activity 8: Reclined MIP - 4 sets x 5 reps (PT assist with lifting and controlling R LE.)    Therapeutic Activity  Therapeutic Activity 1: Gait with rollator: 80 ft x 2    OP EDUCATION:   Instructed to use walker or rollator for all mobility in and out of the house. Edu re: severe consequences possible with falls including fractures. Instructed to attempting MIP while bed is in upright/reclined  "position.    Assessment:  PT Assessment  Assessment Comment: Patient tolerated session well. Continues to suffer falls at home despite training for movement in small spaces and techniques to use higher intensity of effort to improve movement quality. L ankle inversion with gait -especially when fatigued causing increased risk of falls due to \"rolling ankle.\" Add'l edu re: AFO.  Will benefit from continued therapy.    Plan:  OP PT Plan  PT Frequency: 1 time per week  Duration: 8 weeks  Onset Date: 10/02/23  Certification Period Start Date: 05/22/24  Certification Period End Date: 11/13/24  Number of Treatments Authorized: 7/20 - [place]    Goals:  Active       PT Problem       PT Goal 1       Start:  02/05/24    Expected End:  11/13/24       *STG MET*  LTG:   - Progress HEP and OMID within pt tolerance to goal attainment, including return to ADL/IADL and functional activities w/ less fall potential/improved safety as measured by 5 point improvement in MiniBEST test:  TEST ISSUED TODAY - 8/28   - Pt. will have improved balance ability as evidenced by demonstraing static standing for 2 minutes on foam surface to demonstrate reduced fall risk.   - Pt. will have safe, independent ambulation with safest/least restrictive assistive device for community distances and stairs/curbs with normalized gait pattern.   - Pt. will have at least 8 point improvement in functional test score- MiniBEST test to > 15/28 to demonstrate reduced fall risk.  - Pt will be supervised and a gait belt will be used if needed to assure safety in the clinic during treatment          Patient Stated Goal 1       Start:  02/05/24    Expected End:  11/13/24       Safely perform all daily activities without falling for 3-4 weeks by using appropriate device and movement strategies.               Time Calculation  Start Time: 1445  Stop Time: 1530  Time Calculation (min): 45 min  PT Therapeutic Procedures Time Entry  Therapeutic Exercise Time Entry: " 40  Therapeutic Activity Time Entry: 3,

## 2024-10-07 ENCOUNTER — TREATMENT (OUTPATIENT)
Dept: PHYSICAL THERAPY | Facility: CLINIC | Age: 74
End: 2024-10-07
Payer: MEDICARE

## 2024-10-07 DIAGNOSIS — R26.2 CANNOT WALK: ICD-10-CM

## 2024-10-07 PROCEDURE — 97110 THERAPEUTIC EXERCISES: CPT | Mod: GP | Performed by: PHYSICAL THERAPIST

## 2024-10-07 NOTE — PROGRESS NOTES
"Physical Therapy Treatment    Patient Name: Edilson Cantu  MRN: [id number]  Today's Date: 10/7/2024    Current Problem  Problem List Items Addressed This Visit    None  Visit Diagnoses         Codes    Cannot walk     R26.2            Insurance:  Payor: [employer] / Plan: [employer] / Product Type: *No Product type* /   Number of Treatments Authorized: 7/20 - [place]  Certification Period Start Date: 05/22/24  Certification Period End Date: 11/13/24    Subjective   General  Reason for Referral: DIFFICULTY WALKING  Referred By: Dr Landeros  Past Medical History Relevant to Rehab: PD dx in 2020. Taking CD/LD every 3 hours.  General Comment: Denies falls today.    Performing HEP?: Yes    Precautions  Precautions  Precautions Comment: high fall risk, post op R TKR  Pain   Some knee and hip pain    Objective   Treatments:    Therapeutic Exercise  Therapeutic Exercise Activity 1: SciFit x 5 min  Therapeutic Exercise Activity 2: Forward stepping at // bars  Therapeutic Exercise Activity 3: Lars shoulder press: 5 hold x 10 on TG  Therapeutic Exercise Activity 5: TG: level 3 - 2 x 2 min  Therapeutic Exercise Activity 6: Gait: 75 ft x 2 with SPC and CGA. 100 ft x 2 with rollator.  Therapeutic Exercise Activity 7: Reclined ball squeeze: 5 hold x 10  Therapeutic Exercise Activity 8: Reclined active assist hip flexion:2  x 10 - B (PT assist with lifting and controlling R LE.)     OP EDUCATION:   Edu re: benefit of AFO.     Assessment:  PT Assessment  Assessment Comment: Patient tolerated session well. Reports no falls yet today. Patient has been falling daily for the last few years. Using movement methods as able at home for safe some access. L ankle inversion with gait -especially when fatigued causing increased risk of falls due to \"rolling ankle.\" Add'l edu re: AFO.  Will benefit from continued therapy.    Plan:  OP PT Plan  PT Frequency: 1 time per week  Duration: 7 weeks  Onset Date: 10/02/23  Certification Period " Start Date: 05/22/24  Certification Period End Date: 11/13/24  Number of Treatments Authorized: 7/20 - [place]    Goals:  Active       PT Problem       PT Goal 1       Start:  02/05/24    Expected End:  11/13/24       *STG MET*  LTG:   - Progress HEP and OMID within pt tolerance to goal attainment, including return to ADL/IADL and functional activities w/ less fall potential/improved safety as measured by 5 point improvement in MiniBEST test:  TEST ISSUED TODAY - 8/28   - Pt. will have improved balance ability as evidenced by demonstraing static standing for 2 minutes on foam surface to demonstrate reduced fall risk.   - Pt. will have safe, independent ambulation with safest/least restrictive assistive device for community distances and stairs/curbs with normalized gait pattern.   - Pt. will have at least 8 point improvement in functional test score- MiniBEST test to > 15/28 to demonstrate reduced fall risk.  - Pt will be supervised and a gait belt will be used if needed to assure safety in the clinic during treatment          Patient Stated Goal 1       Start:  02/05/24    Expected End:  11/13/24       Safely perform all daily activities without falling for 3-4 weeks by using appropriate device and movement strategies.               Time Calculation  Start Time: 1330  Stop Time: 1415  Time Calculation (min): 45 min  PT Therapeutic Procedures Time Entry  Therapeutic Exercise Time Entry: 40,

## 2024-10-14 ENCOUNTER — TREATMENT (OUTPATIENT)
Dept: PHYSICAL THERAPY | Facility: CLINIC | Age: 74
End: 2024-10-14
Payer: MEDICARE

## 2024-10-14 DIAGNOSIS — R26.2 CANNOT WALK: ICD-10-CM

## 2024-10-14 PROCEDURE — 97110 THERAPEUTIC EXERCISES: CPT | Mod: GP | Performed by: PHYSICAL THERAPIST

## 2024-10-14 NOTE — PROGRESS NOTES
Physical Therapy Treatment    Patient Name: Edilson Cantu  MRN: [id number]  Today's Date: 10/14/2024    Current Problem  Problem List Items Addressed This Visit    None  Visit Diagnoses         Codes    Cannot walk     R26.2            Insurance:  Payor: [employer] / Plan: [employer]ARE / Product Type: *No Product type* /   Number of Treatments Authorized: 8/20 - [place]  Certification Period Start Date: 05/22/24  Certification Period End Date: 11/13/24    Subjective   General  Reason for Referral: DIFFICULTY WALKING  Referred By: Dr Landeros  Past Medical History Relevant to Rehab: PD dx in 2020. Taking CD/LD every 3 hours.  General Comment: Denies falls today.States he is falling less often. Subjectively 20% less. Reports fall last Friday here he hit the back of his neck - no visit to ER. Improving. - no pain complaints today. , but fell on Friday and hit his neck - improving.    Performing HEP?: Yes    Precautions  Precautions  Precautions Comment: high fall risk, post op R TKR  Pain   Worse at L knee - LBP.     Objective   Treatments:    Therapeutic Exercise  Therapeutic Exercise Activity 1: SciFit x 5 min  Therapeutic Exercise Activity 3: Lars shoulder press: 5 hold x 10 on TG  Therapeutic Exercise Activity 4: TG: R/L hip flexion 2 x 10 - PT assist to control lower extemity.  Therapeutic Exercise Activity 5: TG: level 4- 1 x 2 min  Therapeutic Exercise Activity 6: SLR: * opposite hip add/abd - 2 x 10 -ea  Therapeutic Exercise Activity 7: Supine Red ball bridges x 10  Therapeutic Exercise Activity 8: Supine bridges: x 10  Therapeutic Exercise Activity 9: Supine trunk rotation iso's 10 hold x 5 - B  Therapeutic Exercise Activity 10: Supine Red ball - feet on ball - stabiliity x 5  Therapeutic Exercise Activity 11: Supine Red ball fwd/bwd x 10    OP EDUCATION:   Edu re: AFO process. Patient and spouse to contact orthotist/ re: AFO to control tone at L foot/ankle.     Assessment:  PT  "Assessment  Assessment Comment: Patient tolerated session - able to work through increased knee pain with exercises.  Reports no falls yet today. Patient has been falling daily for the last few years. Reporting 20% less often recently.  Using movement methods as able at home for safe some access. L ankle inversion with gait -especially when fatigued causing increased risk of falls due to \"rolling ankle.\" Instructed to follow up with Dr. Weinstein re: AFO to .   Will benefit from continued therapy.    Plan:  OP PT Plan  PT Frequency: 1 time per week  Duration: 7 weeks  Onset Date: 10/02/23  Certification Period Start Date: 05/22/24  Certification Period End Date: 11/13/24  Number of Treatments Authorized: 8/20 - [place]    Goals:  Active       PT Problem       PT Goal 1       Start:  02/05/24    Expected End:  11/13/24       *STG MET*  LTG:   - Progress HEP and OMID within pt tolerance to goal attainment, including return to ADL/IADL and functional activities w/ less fall potential/improved safety as measured by 5 point improvement in MiniBEST test:  TEST ISSUED TODAY - 8/28   - Pt. will have improved balance ability as evidenced by demonstraing static standing for 2 minutes on foam surface to demonstrate reduced fall risk.   - Pt. will have safe, independent ambulation with safest/least restrictive assistive device for community distances and stairs/curbs with normalized gait pattern.   - Pt. will have at least 8 point improvement in functional test score- MiniBEST test to > 15/28 to demonstrate reduced fall risk.  - Pt will be supervised and a gait belt will be used if needed to assure safety in the clinic during treatment          Patient Stated Goal 1       Start:  02/05/24    Expected End:  11/13/24       Safely perform all daily activities without falling for 3-4 weeks by using appropriate device and movement strategies.               Time Calculation  Start Time: 1300  Stop Time: 1345  Time Calculation (min): 45 " min  PT Therapeutic Procedures Time Entry  Therapeutic Exercise Time Entry: 40,

## 2024-10-21 ENCOUNTER — TREATMENT (OUTPATIENT)
Dept: PHYSICAL THERAPY | Facility: CLINIC | Age: 74
End: 2024-10-21
Payer: MEDICARE

## 2024-10-21 DIAGNOSIS — R26.2 CANNOT WALK: ICD-10-CM

## 2024-10-21 DIAGNOSIS — M21.372 LEFT FOOT DROP: Primary | ICD-10-CM

## 2024-10-21 PROCEDURE — 97110 THERAPEUTIC EXERCISES: CPT | Mod: GP | Performed by: PHYSICAL THERAPIST

## 2024-10-21 NOTE — PROGRESS NOTES
Physical Therapy Treatment    Patient Name: Edilson Cantu  MRN: [id number]  Today's Date: 10/21/2024    Current Problem  Problem List Items Addressed This Visit    None  Visit Diagnoses         Codes    Cannot walk     R26.2            Insurance:  Payor: [employer] / Plan: [employer] / Product Type: *No Product type* /   Number of Treatments Authorized: 9/20 - [place]  Certification Period Start Date: 05/22/24  Certification Period End Date: 11/13/24    Subjective   General  Reason for Referral: DIFFICULTY WALKING  Referred By: Dr Landeros  Past Medical History Relevant to Rehab: PD dx in 2020. Taking CD/LD every 3 hours.  General Comment: Reports having a difficult day yesterday with ~ 5 falls. States he would suddenly just loose his sense of equilibrium. No dizziness.  Denies injury. States that Saturday and today have been normal. No word re: AFO> Dr. Weinstein is OOT.    Performing HEP?: Yes    Precautions  Precautions  Precautions Comment: high fall risk, post op R TKR  Pain       Objective   Walks with rollator with increased sway and scuffing today.   Treatments:    Therapeutic Exercise  Therapeutic Exercise Activity 1: Static standing: 2 x 1 min  Therapeutic Exercise Activity 3: Lars shoulder press: 5 hold x 10 on TG  Therapeutic Exercise Activity 6: SLR: * opposite hip add/abd - 2 x 5 -ea  Therapeutic Exercise Activity 7: Supine Red ball bridges x 10  Therapeutic Exercise Activity 8: Supine bridges: x 10  Therapeutic Exercise Activity 10: Supine Red ball - feet on ball - stabiliity x 5  Therapeutic Exercise Activity 11: Supine Red ball fwd/bwd x 10  Therapeutic Exercise Activity 12: Seated Paloff - YTB x 5 - B    OP EDUCATION:   Re: AFO follow up.     Assessment:  PT Assessment  Assessment Comment: Reduced intensity session today given recent fall history. Able to contact Dr. Landeros re: AFO. He indicated that he had ordered an AFO for  in [place]. Client instructed. Client will follow up with  Dr. Landeros if necessary. Increased LBP today during exercises, but able to complete exercises without increased pain.    Plan:  OP PT Plan  PT Frequency: 1 time per week  Duration: 7 weeks  Onset Date: 10/02/23  Certification Period Start Date: 05/22/24  Certification Period End Date: 11/13/24  Number of Treatments Authorized: 9/20 - [place]    Goals:  Active       PT Problem       PT Goal 1       Start:  02/05/24    Expected End:  11/13/24       *STG MET*  LTG:   - Progress HEP and OMID within pt tolerance to goal attainment, including return to ADL/IADL and functional activities w/ less fall potential/improved safety as measured by 5 point improvement in MiniBEST test:  TEST ISSUED TODAY - 8/28   - Pt. will have improved balance ability as evidenced by demonstraing static standing for 2 minutes on foam surface to demonstrate reduced fall risk.   - Pt. will have safe, independent ambulation with safest/least restrictive assistive device for community distances and stairs/curbs with normalized gait pattern.   - Pt. will have at least 8 point improvement in functional test score- MiniBEST test to > 15/28 to demonstrate reduced fall risk.  - Pt will be supervised and a gait belt will be used if needed to assure safety in the clinic during treatment          Patient Stated Goal 1       Start:  02/05/24    Expected End:  11/13/24       Safely perform all daily activities without falling for 3-4 weeks by using appropriate device and movement strategies.               Time Calculation  Start Time: 1245  Stop Time: 1315  Time Calculation (min): 30 min  PT Therapeutic Procedures Time Entry  Therapeutic Exercise Time Entry: 40,

## 2024-10-22 ENCOUNTER — APPOINTMENT (OUTPATIENT)
Dept: ORTHOPEDIC SURGERY | Facility: CLINIC | Age: 74
End: 2024-10-22
Payer: MEDICARE

## 2024-10-28 ENCOUNTER — TREATMENT (OUTPATIENT)
Dept: PHYSICAL THERAPY | Facility: CLINIC | Age: 74
End: 2024-10-28
Payer: MEDICARE

## 2024-10-28 DIAGNOSIS — R26.2 CANNOT WALK: ICD-10-CM

## 2024-10-28 PROCEDURE — 97110 THERAPEUTIC EXERCISES: CPT | Mod: GP | Performed by: PHYSICAL THERAPIST

## 2024-10-28 ASSESSMENT — PAIN SCALES - GENERAL: PAINLEVEL_OUTOF10: 3

## 2024-10-28 ASSESSMENT — PAIN - FUNCTIONAL ASSESSMENT: PAIN_FUNCTIONAL_ASSESSMENT: 0-10

## 2024-10-29 ENCOUNTER — HOSPITAL ENCOUNTER (OUTPATIENT)
Dept: RADIOLOGY | Facility: HOSPITAL | Age: 74
Discharge: HOME | End: 2024-10-29
Payer: MEDICARE

## 2024-10-29 ENCOUNTER — APPOINTMENT (OUTPATIENT)
Dept: ORTHOPEDIC SURGERY | Facility: CLINIC | Age: 74
End: 2024-10-29
Payer: MEDICARE

## 2024-10-29 DIAGNOSIS — M25.562 CHRONIC PAIN OF LEFT KNEE: ICD-10-CM

## 2024-10-29 DIAGNOSIS — G89.29 CHRONIC PAIN OF LEFT KNEE: ICD-10-CM

## 2024-10-29 DIAGNOSIS — M25.559 HIP PAIN, ACUTE, UNSPECIFIED LATERALITY: ICD-10-CM

## 2024-10-29 PROCEDURE — 99214 OFFICE O/P EST MOD 30 MIN: CPT | Performed by: ORTHOPAEDIC SURGERY

## 2024-10-29 PROCEDURE — 20610 DRAIN/INJ JOINT/BURSA W/O US: CPT | Performed by: ORTHOPAEDIC SURGERY

## 2024-10-29 PROCEDURE — 73523 X-RAY EXAM HIPS BI 5/> VIEWS: CPT

## 2024-10-29 PROCEDURE — 1159F MED LIST DOCD IN RCRD: CPT | Performed by: ORTHOPAEDIC SURGERY

## 2024-10-29 PROCEDURE — 73523 X-RAY EXAM HIPS BI 5/> VIEWS: CPT | Mod: BILATERAL PROCEDURE | Performed by: RADIOLOGY

## 2024-10-29 PROCEDURE — 1123F ACP DISCUSS/DSCN MKR DOCD: CPT | Performed by: ORTHOPAEDIC SURGERY

## 2024-10-29 PROCEDURE — 1125F AMNT PAIN NOTED PAIN PRSNT: CPT | Performed by: ORTHOPAEDIC SURGERY

## 2024-10-29 PROCEDURE — 73564 X-RAY EXAM KNEE 4 OR MORE: CPT | Mod: LEFT SIDE | Performed by: RADIOLOGY

## 2024-10-29 PROCEDURE — 73564 X-RAY EXAM KNEE 4 OR MORE: CPT | Mod: LT

## 2024-10-29 PROCEDURE — 1036F TOBACCO NON-USER: CPT | Performed by: ORTHOPAEDIC SURGERY

## 2024-10-29 PROCEDURE — 1160F RVW MEDS BY RX/DR IN RCRD: CPT | Performed by: ORTHOPAEDIC SURGERY

## 2024-10-29 PROCEDURE — 1157F ADVNC CARE PLAN IN RCRD: CPT | Performed by: ORTHOPAEDIC SURGERY

## 2024-10-29 RX ORDER — TRIAMCINOLONE ACETONIDE 40 MG/ML
1 INJECTION, SUSPENSION INTRA-ARTICULAR; INTRAMUSCULAR
Status: COMPLETED | OUTPATIENT
Start: 2024-10-29 | End: 2024-10-29

## 2024-10-29 ASSESSMENT — PAIN SCALES - GENERAL: PAINLEVEL_OUTOF10: 1

## 2024-11-05 ENCOUNTER — TREATMENT (OUTPATIENT)
Dept: PHYSICAL THERAPY | Facility: CLINIC | Age: 74
End: 2024-11-05
Payer: MEDICARE

## 2024-11-05 DIAGNOSIS — R26.2 CANNOT WALK: ICD-10-CM

## 2024-11-05 PROCEDURE — 97110 THERAPEUTIC EXERCISES: CPT | Mod: GP | Performed by: PHYSICAL THERAPIST

## 2024-11-05 ASSESSMENT — PAIN - FUNCTIONAL ASSESSMENT: PAIN_FUNCTIONAL_ASSESSMENT: 0-10

## 2024-11-05 ASSESSMENT — PAIN SCALES - GENERAL: PAINLEVEL_OUTOF10: 1

## 2024-11-05 NOTE — PROGRESS NOTES
"Physical Therapy Treatment    Patient Name: Edilson Cantu  MRN: [id number]  Today's Date: 11/5/2024    Current Problem  Problem List Items Addressed This Visit    None  Visit Diagnoses         Codes    Cannot walk     R26.2            Insurance:  Payor: [employer] / Plan: [employer] / Product Type: *No Product type* /   Number of Treatments Authorized: 11/20 - [place]  Certification Period Start Date: 05/22/24  Certification Period End Date: 11/13/24    Subjective : Reports falling much less - falling now 3-4 days a week vs. multiple falls daily.   General  Reason for Referral: DIFFICULTY WALKING  Referred By: Dr Landeros  Past Medical History Relevant to Rehab: PD dx in 2020. Taking CD/LD every 3 hours.  General Comment: Saw Md who recommends cortisone injection vs moving forward with knee replacement at this time. States knee is \"bone on bone,\" but doing better since injection. Fitted for AFO which will not be ready until Nov 25th.    Performing HEP?: Yes    Precautions  Precautions  Precautions Comment: high fall risk, post op R TKR  Pain  Pain Assessment: 0-10  0-10 (Numeric) Pain Score: 1  Pain Type: Chronic pain  Pain Location: Knee  Pain Orientation: Left    Objective   Treatments:    Therapeutic Exercise  Therapeutic Exercise Activity 1: SciFit level 3 x 10 min  Therapeutic Exercise Activity 2: Standing hip abd: ALT x 5  Therapeutic Exercise Activity 3: Seated ball push: 2 x 5 - 5 hold  Therapeutic Exercise Activity 4: Seated rows - red ball at legs 2 x 8  Therapeutic Exercise Activity 5: Seated downward ball push: 5 hold x 10  Therapeutic Exercise Activity 6: TG: level 4 - 2 x 10 - hip flexion - PT assist for R LE.  Therapeutic Exercise Activity 7: TG: level 4 - 1 min x 2  Therapeutic Exercise Activity 8: Head press: 5 hold x 5  Therapeutic Exercise Activity 9: Shoulder press: 5 hold x 5  Therapeutic Exercise Activity 10: Supine Red ball leg press: 25,50-75% effort  2 x 5 R / 25% effort  2 x 5 L " (Left knee OA limites intensity of exercise.)  Therapeutic Exercise Activity 11: Bridges: 5 hold x 10  Therapeutic Exercise Activity 12: Red ball: at feet/ keep legs centered x 2 minutes.    OP EDUCATION:  Outpatient Education  Education Comment: CONTINUJE WITH CURRENT HEP    Assessment:  PT Assessment  Assessment Comment: Patient with less knee pain following injection. Reporting fewer falls overall. AFO 3-4 weeks away, client looking forward to add'l support. Gaining strength aeb improved LE control during exercises.  Will benefit from continued therapy towards walking, transfer and fall risk goals.  Improved sitting posture during exercise using 2 foam pads at back of chair.   Plan:  OP PT Plan  PT Frequency: 1 time per week  Duration: 7 weeks  Onset Date: 10/02/23  Certification Period Start Date: 05/22/24  Certification Period End Date: 11/13/24  Number of Treatments Authorized: 11/20 - [place]  PN note next visit re: POC dates.   Goals:  Active       PT Problem       PT Goal 1       Start:  02/05/24    Expected End:  11/13/24       *STG MET*  LTG:   - Progress HEP and OMID within pt tolerance to goal attainment, including return to ADL/IADL and functional activities w/ less fall potential/improved safety as measured by 5 point improvement in MiniBEST test:  TEST ISSUED TODAY - 8/28   - Pt. will have improved balance ability as evidenced by demonstraing static standing for 2 minutes on foam surface to demonstrate reduced fall risk.   - Pt. will have safe, independent ambulation with safest/least restrictive assistive device for community distances and stairs/curbs with normalized gait pattern.   - Pt. will have at least 8 point improvement in functional test score- MiniBEST test to > 15/28 to demonstrate reduced fall risk.  - Pt will be supervised and a gait belt will be used if needed to assure safety in the clinic during treatment          Patient Stated Goal 1       Start:  02/05/24    Expected End:  11/13/24        Safely perform all daily activities without falling for 3-4 weeks by using appropriate device and movement strategies.               Time Calculation  Start Time: 1000  Stop Time: 1045  Time Calculation (min): 45 min  PT Therapeutic Procedures Time Entry  Therapeutic Exercise Time Entry: 42,

## 2024-11-13 ENCOUNTER — TREATMENT (OUTPATIENT)
Dept: PHYSICAL THERAPY | Facility: CLINIC | Age: 74
End: 2024-11-13
Payer: MEDICARE

## 2024-11-13 DIAGNOSIS — M21.372 LEFT FOOT DROP: ICD-10-CM

## 2024-11-13 DIAGNOSIS — M17.9 KNEE OSTEOARTHRITIS: Primary | ICD-10-CM

## 2024-11-13 DIAGNOSIS — R26.2 CANNOT WALK: ICD-10-CM

## 2024-11-13 DIAGNOSIS — M62.81 MUSCLE WEAKNESS-GENERAL: ICD-10-CM

## 2024-11-13 DIAGNOSIS — M48.07 LUMBOSACRAL SPINAL STENOSIS: ICD-10-CM

## 2024-11-13 DIAGNOSIS — G20.A1 PARKINSON'S DISEASE, UNSPECIFIED WHETHER DYSKINESIA PRESENT, UNSPECIFIED WHETHER MANIFESTATIONS FLUCTUATE: ICD-10-CM

## 2024-11-13 PROCEDURE — 97530 THERAPEUTIC ACTIVITIES: CPT | Mod: GP,KX | Performed by: PHYSICAL THERAPIST

## 2024-11-13 ASSESSMENT — PAIN SCALES - GENERAL: PAINLEVEL_OUTOF10: 1

## 2024-11-13 ASSESSMENT — PAIN - FUNCTIONAL ASSESSMENT: PAIN_FUNCTIONAL_ASSESSMENT: 0-10

## 2024-11-13 NOTE — PROGRESS NOTES
Physical Therapy Treatment    Patient Name: Edilson Cantu  MRN: [id number]  Today's Date: 11/13/2024    Current Problem  Problem List Items Addressed This Visit             ICD-10-CM    Knee osteoarthritis - Primary M17.9    Left foot drop M21.372    Lumbosacral spinal stenosis M48.07    Muscle weakness-general M62.81    Parkinson's disease (Multi) G20.A1     Other Visit Diagnoses         Codes    Cannot walk     R26.2            Insurance:  Payor: [employer] / Plan: [employer] / Product Type: *No Product type* /      Certification Period Start Date: 11/13/24  Certification Period End Date: 02/12/25    Subjective   General  Reason for Referral: DIFFICULTY WALKING  Referred By: Dr Landeros  Past Medical History Relevant to Rehab: PD dx in 2020. Taking CD/LD every 3 hours.  General Comment: States cortisone injection at knee is doing well. AFO has been fit, to be issued late November. Reports no falls today, but a few earlier in the week - happens during transitions - in kitchen.    Performing HEP?: Yes    Precautions  Precautions  Precautions Comment: high fall risk, post op R TKR  Pain  Pain Assessment: 0-10  0-10 (Numeric) Pain Score: 1  Pain Type: Chronic pain  Pain Location: Knee  Pain Orientation: Left    Objective   HIP  PROM: WFL  Specific Lower Extremity MMT  R Iliopsoas: (5/5): 3+/5  L Iliopsoas: (5/5): 4/5  R Gluteals (sidelying): (5/5): 3-/5  L Gluteals (sidelying): (5/5): 3/5  R knee flexion: (5/5): 4+/5  L knee flexion: (5/5): 4/5  R knee extension: (5/5): 4+/5  L knee extension: (5/5): 4+/5    Gait  Ambulates with rollator and demonstrating fair control. Tends to walk too near obstacles and often runs into walls and door frames.  Improves with cueing. Poor LE control impacts his control of the rollator. This is the best device for client at this time and PT will continue to work on improved control by addressing LE strength, obtaining AFO for better control of LE placement and instruction of  use.   Outcome Measures:      Anticipatory:   2,0,0 = 2  Reactive postural control = 0  Sensory orientation: 2,1,1 = 4  Dynamic gait: 2,1,1,1,1 = 6 w/rollator.  Total:  = 42% (Cut off score for non-fallers is >69%)  Treatments:    Therapeutic Activity  Therapeutic Activity 1: See measures and goal status.    OP EDUCATION:   Edu re: need to avoid multi-tasking - especially while performing transitional movements.  Client tends to transfer (reaching away from rollator/support) while engaging in cognitive tasks which contributes to fall. To address in therapy with dynamic activities with 1st cognitive and then add'l motor tasks.     Assessment:  PT Assessment  Assessment Comment: Patient is demonstrating slow progress since performing LSVT based exercise program. Client has not undergone a traditional LSVT program and likely would not tolerate it's intensity. His MiniBest score has improved from  to  and demonstrates potential for further gains with ongoing strengthening and once the AFO is in place at his L foot. Patient will also benefit from performing standing activities while performing cognitive tasks to improve his multi-tasking balance ability. Continued postural strengthening to reduce LBP component to his movement problems.  Patient's falls are multi-factorial - (PD, severe knee OA, L foot drop, severe spinal stenosis, chronicity of generalized weakness) and will require continued therapy to progress to having safe access in his home.    Plan:  OP PT Plan  PT Frequency: 1 time per week  Duration: 3 months  Certification Period Start Date: 24  Certification Period End Date: 25  Rehab Potential: Fair  Plan of Care Agreement: Patient    Goals:  Active       PT Problem       PT Goal 1       Start:  24    Expected End:  25         LT) Progress HEP and OMID within pt tolerance to goal attainment, including return to ADL/IADL and functional activities w/ less fall  potential/improved safety as measured by 5 point improvement in MiniBEST test:  PROGRESSED TOWARD: 12/28 (on 11/13/24) vs 8/28 (on 9/13/24)  2)  Pt. will have improved static balance ability as evidenced by performing static standing for 2 minutes on foam surface and to demonstrate improved dynamic balance by performing LSVT modified standing forward step, side step and backwards step independently without falls.   3)  Pt. will have safe, independent ambulation with safest/least restrictive assistive device for community distances and stairs/curbs with normalized gait pattern.  PROGRESSING TOWARDS. Continues to run into walls with rollator and have poor L foot/ankle control.   4) Pt. will have at least 8 point improvement in functional test score- MiniBEST test to > 15/28 to demonstrate reduced fall risk. PROGRESSING TOWARD. Scored 12/28 on 11/13/24.  5)  Pt will report no falling for 1 week to demonstrate significant progress in safe home and community access, understanding of movement principles specific to PD related problems of multi-tasking in addition to his many orthopedic complications by discharge          Patient Stated Goal 1       Start:  02/05/24    Expected End:  02/13/25       1) Safely perform all daily activities without falling for 3-4 weeks by using appropriate device and movement strategies by discharge.  PROGRESSING TOWARDS.  Patient reporting fewer falling episodes. Reports he was previously falling multiple times a day, but now reporting falling less often - sometimes going full days without falling.               Time Calculation  Start Time: 1645  Stop Time: 1730  Time Calculation (min): 45 min  PT Therapeutic Procedures Time Entry  Therapeutic Activity Time Entry: 40,

## 2024-11-19 ENCOUNTER — TREATMENT (OUTPATIENT)
Dept: PHYSICAL THERAPY | Facility: CLINIC | Age: 74
End: 2024-11-19
Payer: MEDICARE

## 2024-11-19 DIAGNOSIS — R26.2 CANNOT WALK: ICD-10-CM

## 2024-11-19 PROCEDURE — 97110 THERAPEUTIC EXERCISES: CPT | Mod: GP | Performed by: PHYSICAL THERAPIST

## 2024-11-19 ASSESSMENT — PAIN - FUNCTIONAL ASSESSMENT: PAIN_FUNCTIONAL_ASSESSMENT: 0-10

## 2024-11-19 ASSESSMENT — PAIN SCALES - GENERAL: PAINLEVEL_OUTOF10: 1

## 2024-11-19 NOTE — PROGRESS NOTES
"Physical Therapy Treatment    Patient Name: Edilson Cantu  MRN: [id number]  Today's Date: 11/19/2024    Current Problem  Problem List Items Addressed This Visit    None  Visit Diagnoses         Codes    Cannot walk     R26.2            Insurance:  Payor: [employer] / Plan: [employer] / Product Type: *No Product type* /      Certification Period Start Date: 11/13/24  Certification Period End Date: 02/12/25    Subjective   General  Reason for Referral: DIFFICULTY WALKING  Referred By: Dr Landeros  Past Medical History Relevant to Rehab: PD dx in 2020. Taking CD/LD every 3 hours.  General Comment: States \"I have been feeling stronger recently, better control when walking.\" Falling less - about 1 x a day vs multiple x's a day.    Performing HEP?: Yes    Precautions  Precautions  Precautions Comment: high fall risk, post op R TKR  Pain  Pain Assessment: 0-10  0-10 (Numeric) Pain Score: 1  Pain Type: Chronic pain  Pain Location: Knee  Pain Orientation: Left    Objective   Treatments:    Therapeutic Exercise  Therapeutic Exercise Activity 1: SciFit: level 2.5 - 6 min with 2 bouts of high intensity.  Therapeutic Exercise Activity 2: Standing hip abd: ALT x 10  Therapeutic Exercise Activity 3: Seated ball push: x 1 min  Therapeutic Exercise Activity 4: Seated rows - red ball at legs - x 1 min  Therapeutic Exercise Activity 5: Seated ball rolls with supination: x 1 min  Therapeutic Exercise Activity 6: TG: level 4 - 2 x 10 - hip flexion - PT assist for R LE.  Therapeutic Exercise Activity 7: TG: level 4 - 1 min x 2  Therapeutic Exercise Activity 8: SLR: x 10 -B  Therapeutic Exercise Activity 10: Supine Red ball leg press: 25,50-75% effort  2 x 5 R / 25% effort  2 x 5 L (Left knee OA limites intensity of exercise.)  Therapeutic Exercise Activity 11: Bridges: 5 hold x 10  Therapeutic Exercise Activity 12: Red ball: at feet/ keep legs centered x 2 minutes.  Therapeutic Exercise Activity 13: Red ball: FWD/BWD x 1 " "min      Assessment:  PT Assessment  Assessment Comment: Tolerated session well. Severe crepitus at L knee, but managable pain. Added increased intensity at bike as \"neural primer\" to allow for greater effort during session. Patient is demonstrating slow progress since performing LSVT based exercise program. Client has not undergone a traditional LSVT program and likely would not tolerate it's intensity. His MiniBest score has improved from  to  and demonstrates potential for further gains with ongoing strengthening and once the AFO is in place at his L foot. Patient will also benefit from performing standing activities while performing cognitive tasks to improve his multi-tasking balance ability. Continued postural strengthening to reduce LBP component to his movement problems.  Patient's falls are multi-factorial - (PD, severe knee OA, L foot drop, severe spinal stenosis, chronicity of generalized weakness) and will require continued therapy to progress to having safe access in his home.    Plan:  OP PT Plan  PT Frequency: 1 time per week  Duration: 3 months  Certification Period Start Date: 24  Certification Period End Date: 25  Rehab Potential: Fair  Plan of Care Agreement: Patient    Goals:  Active       PT Problem       PT Goal 1       Start:  24    Expected End:  25         LT) Progress HEP and OMID within pt tolerance to goal attainment, including return to ADL/IADL and functional activities w/ less fall potential/improved safety as measured by 5 point improvement in MiniBEST test:  PROGRESSED TOWARD:  (on 24) vs  (on 24)  2)  Pt. will have improved static balance ability as evidenced by performing static standing for 2 minutes on foam surface and to demonstrate improved dynamic balance by performing LSVT modified standing forward step, side step and backwards step independently without falls.   3)  Pt. will have safe, independent ambulation with " safest/least restrictive assistive device for community distances and stairs/curbs with normalized gait pattern.  PROGRESSING TOWARDS. Continues to run into walls with rollator and have poor L foot/ankle control.   4) Pt. will have at least 8 point improvement in functional test score- MiniBEST test to > 15/28 to demonstrate reduced fall risk. PROGRESSING TOWARD. Scored 12/28 on 11/13/24.  5)  Pt will report no falling for 1 week to demonstrate significant progress in safe home and community access, understanding of movement principles specific to PD related problems of multi-tasking in addition to his many orthopedic complications by discharge          Patient Stated Goal 1       Start:  02/05/24    Expected End:  02/13/25       1) Safely perform all daily activities without falling for 3-4 weeks by using appropriate device and movement strategies by discharge.  PROGRESSING TOWARDS.  Patient reporting fewer falling episodes. Reports he was previously falling multiple times a day, but now reporting falling less often - sometimes going full days without falling.               Time Calculation  Start Time: 1445  Stop Time: 1530  Time Calculation (min): 45 min  PT Therapeutic Procedures Time Entry  Therapeutic Exercise Time Entry: 42,

## 2024-11-26 ENCOUNTER — TREATMENT (OUTPATIENT)
Dept: PHYSICAL THERAPY | Facility: CLINIC | Age: 74
End: 2024-11-26
Payer: MEDICARE

## 2024-11-26 DIAGNOSIS — R26.2 CANNOT WALK: ICD-10-CM

## 2024-11-26 PROCEDURE — 97110 THERAPEUTIC EXERCISES: CPT | Mod: GP | Performed by: PHYSICAL THERAPIST

## 2024-11-26 ASSESSMENT — PAIN - FUNCTIONAL ASSESSMENT: PAIN_FUNCTIONAL_ASSESSMENT: 0-10

## 2024-11-26 ASSESSMENT — PAIN SCALES - GENERAL: PAINLEVEL_OUTOF10: 3

## 2024-11-26 NOTE — PROGRESS NOTES
"Physical Therapy Treatment    Patient Name: Edilson Cantu  MRN: [id number]  Today's Date: 11/26/2024    Current Problem  Problem List Items Addressed This Visit    None  Visit Diagnoses         Codes    Cannot walk     R26.2            Insurance:  Payor: [employer] / Plan: [employer] / Product Type: *No Product type* /   Number of Treatments Authorized: 14/20  Certification Period Start Date: 11/13/24  Certification Period End Date: 02/12/25    Subjective   General  Reason for Referral: DIFFICULTY WALKING  Referred By: Dr Landeros  Past Medical History Relevant to Rehab: PD dx in 2020. Taking CD/LD every 3 hours.  General Comment: Reports some back pain today of ~ 3/10. States AFO will be delayed due to administrative issues. Knee has been doing well since injection. No falls today or yesterday.    Performing HEP?: Yes    Precautions  Precautions  Precautions Comment: high fall risk, post op R TKR  Pain  Pain Assessment: 0-10  0-10 (Numeric) Pain Score: 3  Pain Type: Chronic pain  Pain Location: Back  Pain Orientation: Lower    Objective   Treatments:    Therapeutic Exercise  Therapeutic Exercise Activity 1: SciFit: level 2.5 - 8 min with 3 bouts of high intensity. (Bouts: 30 s/60 s = \"4/10 somewhat hard\")  Therapeutic Exercise Activity 2: Standing hip abd: ALT x 10  Therapeutic Exercise Activity 4: Seated rows - GTB 2 x 20  Therapeutic Exercise Activity 6: TG: level 4 - 2 x 10 - hip flexion ( 1 ALT)  - PT min assist for R LE.  Therapeutic Exercise Activity 7: TG: level 5 - 2 min  Therapeutic Exercise Activity 8: Bridges: supine w/SB x 1 min  Therapeutic Exercise Activity 9: Lars decompression: 3 min  Therapeutic Exercise Activity 10: Lars head press: 5 hold x 8  Therapeutic Exercise Activity 11: Lars shoulder press: 5 hold x 8  Therapeutic Exercise Activity 12: Red ball: at feet/ keep legs centered x 2 minutes w/perturbations  Therapeutic Exercise Activity 13: Red ball: FWD/BWD x 1 min  Therapeutic " "Exercise Activity 14: Red ball press: 5 hold x 10 ea        Therapeutic Activity  Therapeutic Activity 1: Walking with FWW - 80 ft x 2.  Edu re: need to use RW in his home to avoid fall. \"Rules\" for family to not engage in conversations while patient is up and walking so that he can focus on movement vs multi-tasking which has increased falls.        OP EDUCATION:   Re: reducing fall risk - avoid multi-tasking.     Assessment:  PT Assessment  Assessment Comment: Patient is falling less, but still falling \"hard\" in the kitchen. Continued edu re: how to avoid falls by using rollator and reducing multi-tasking when standing. Tolerated session well today. L ankle nearly caused trip today - AFO for that foot has been delayed. Will benefit from therapy to address falling, strength and movement strategies.    Plan:  OP PT Plan  PT Frequency: 1 time per week  Duration: 3 months  Certification Period Start Date: 24  Certification Period End Date: 25  Number of Treatments Authorized:   Rehab Potential: Fair  Plan of Care Agreement: Patient  Add standing multi-task balance tasks - story telling/reaching with understanding of progressing to stepping with support while multi-tasking.   Goals:  Active       PT Problem       PT Goal 1       Start:  24    Expected End:  25         LT) Progress HEP and OMID within pt tolerance to goal attainment, including return to ADL/IADL and functional activities w/ less fall potential/improved safety as measured by 5 point improvement in MiniBEST test:  PROGRESSED TOWARD:  (on 24) vs  (on 24)  2)  Pt. will have improved static balance ability as evidenced by performing static standing for 2 minutes on foam surface and to demonstrate improved dynamic balance by performing LSVT modified standing forward step, side step and backwards step independently without falls.   3)  Pt. will have safe, independent ambulation with safest/least restrictive " assistive device for community distances and stairs/curbs with normalized gait pattern.  PROGRESSING TOWARDS. Continues to run into walls with rollator and have poor L foot/ankle control.   4) Pt. will have at least 8 point improvement in functional test score- MiniBEST test to > 15/28 to demonstrate reduced fall risk. PROGRESSING TOWARD. Scored 12/28 on 11/13/24.  5)  Pt will report no falling for 1 week to demonstrate significant progress in safe home and community access, understanding of movement principles specific to PD related problems of multi-tasking in addition to his many orthopedic complications by discharge          Patient Stated Goal 1       Start:  02/05/24    Expected End:  02/13/25       1) Safely perform all daily activities without falling for 3-4 weeks by using appropriate device and movement strategies by discharge.  PROGRESSING TOWARDS.  Patient reporting fewer falling episodes. Reports he was previously falling multiple times a day, but now reporting falling less often - sometimes going full days without falling.               Time Calculation  Start Time: 1044  Stop Time: 1142  Time Calculation (min): 58 min  PT Therapeutic Procedures Time Entry  Therapeutic Exercise Time Entry: 53  Therapeutic Activity Time Entry: 3,

## 2024-12-03 ENCOUNTER — TREATMENT (OUTPATIENT)
Dept: PHYSICAL THERAPY | Facility: CLINIC | Age: 74
End: 2024-12-03
Payer: MEDICARE

## 2024-12-03 DIAGNOSIS — R26.2 CANNOT WALK: ICD-10-CM

## 2024-12-03 PROCEDURE — 97530 THERAPEUTIC ACTIVITIES: CPT | Mod: GP | Performed by: PHYSICAL THERAPIST

## 2024-12-03 PROCEDURE — 97110 THERAPEUTIC EXERCISES: CPT | Mod: GP | Performed by: PHYSICAL THERAPIST

## 2024-12-03 ASSESSMENT — PAIN SCALES - GENERAL: PAINLEVEL_OUTOF10: 5 - MODERATE PAIN

## 2024-12-03 ASSESSMENT — PAIN - FUNCTIONAL ASSESSMENT: PAIN_FUNCTIONAL_ASSESSMENT: 0-10

## 2024-12-03 NOTE — PROGRESS NOTES
"Physical Therapy Treatment    Patient Name: Edilson Cantu  MRN: [id number]  Today's Date: 12/3/2024    Current Problem  Problem List Items Addressed This Visit    None  Visit Diagnoses         Codes    Cannot walk     R26.2            Insurance:  Payor: [employer] / Plan: [employer] / Product Type: *No Product type* /   Number of Treatments Authorized: 15/20  Certification Period Start Date: 11/13/24  Certification Period End Date: 02/12/25    Subjective   General  Reason for Referral: DIFFICULTY WALKING  Referred By: Dr Landeros  Past Medical History Relevant to Rehab: PD dx in 2020. Taking CD/LD every 3 hours.  General Comment: Having a very difficult day - very painful at back - \"all my joints are stiff.\" Denies falls today.    Performing HEP?: Yes    Precautions  Precautions  Precautions Comment: high fall risk, post op R TKR  Pain  Pain Assessment: 0-10  0-10 (Numeric) Pain Score: 5 - Moderate pain  Pain Type: Chronic pain  Pain Location: Back  Pain Orientation: Lower    Objective   Treatments:    Therapeutic Exercise  Therapeutic Exercise Activity 4: Seated rows - GTB 2 x 20  Therapeutic Exercise Activity 7: Lars leg press: 5 hold x 8 - B  Therapeutic Exercise Activity 8: Supine bridges: x 1 min  Therapeutic Exercise Activity 9: Lars decompression: 5 min  Therapeutic Exercise Activity 10: Lars head press: 5 hold x 8  Therapeutic Exercise Activity 11: Lars shoulder press: 5 hold x 8  Therapeutic Exercise Activity 12: Red ball: at feet/ keep legs centered x 2 minutes w/perturbations  Therapeutic Exercise Activity 13: Red ball: FWD/BWD x 1 min  Therapeutic Exercise Activity 14: Red ball press: 5 hold x 10 ea        Therapeutic Activity  Therapeutic Activity 1: Side stepping at plinth - with and w/out stories or carrying item.  Therapeutic Activity 2: Sit to stand with with  1/2 step with or w/out stories or carrying item.  Therapeutic Activity 3: Plinth to plinth as if in a galley kitchen / L and " "R / with and without multi-task of stories or carrying light item.  Therapeutic Activity 4: Perched sitting - 1 min (increased LBP)    OP EDUCATION:  Outpatient Education  Education Comment: Education re: importance of \"stacked posture\" when moving in his home from counter to counter and during transfers to avoid falls.    Assessment:  PT Assessment  Assessment Comment: Patient getting AFO for L foot/ankle tomorrow with hopes of reducing L ankle inversion episodes that contribute to falls. Patient continues to have greatest difficutly in kitchen - when crossing from one counter space to another. Patient found todays practice of moving from plinth to plinth helpful in managing this space in his house. Knee pain continues to be better since injection - LBP and body stiffness bad today. Challenged with multi-tasking focus today. Will benefit from continued therapy towards minimizing falls.    Plan:  OP PT Plan  PT Frequency: 1 time per week  Duration: 3 months  Certification Period Start Date: 24  Certification Period End Date: 25  Number of Treatments Authorized: 15/20  Rehab Potential: Fair  Plan of Care Agreement: Patient    Goals:  Active       PT Problem       PT Goal 1       Start:  24    Expected End:  25         LT) Progress HEP and OMID within pt tolerance to goal attainment, including return to ADL/IADL and functional activities w/ less fall potential/improved safety as measured by 5 point improvement in MiniBEST test:  PROGRESSED TOWARD:  (on 24) vs  (on 24)  2)  Pt. will have improved static balance ability as evidenced by performing static standing for 2 minutes on foam surface and to demonstrate improved dynamic balance by performing LSVT modified standing forward step, side step and backwards step independently without falls.   3)  Pt. will have safe, independent ambulation with safest/least restrictive assistive device for community distances and " stairs/curbs with normalized gait pattern.  PROGRESSING TOWARDS. Continues to run into walls with rollator and have poor L foot/ankle control.   4) Pt. will have at least 8 point improvement in functional test score- MiniBEST test to > 15/28 to demonstrate reduced fall risk. PROGRESSING TOWARD. Scored 12/28 on 11/13/24.  5)  Pt will report no falling for 1 week to demonstrate significant progress in safe home and community access, understanding of movement principles specific to PD related problems of multi-tasking in addition to his many orthopedic complications by discharge          Patient Stated Goal 1       Start:  02/05/24    Expected End:  02/13/25       1) Safely perform all daily activities without falling for 3-4 weeks by using appropriate device and movement strategies by discharge.  PROGRESSING TOWARDS.  Patient reporting fewer falling episodes. Reports he was previously falling multiple times a day, but now reporting falling less often - sometimes going full days without falling.               Time Calculation  Start Time: 1500  Stop Time: 1600  Time Calculation (min): 60 min  PT Therapeutic Procedures Time Entry  Therapeutic Exercise Time Entry: 33  Therapeutic Activity Time Entry: 25,

## 2024-12-10 ENCOUNTER — TREATMENT (OUTPATIENT)
Dept: PHYSICAL THERAPY | Facility: CLINIC | Age: 74
End: 2024-12-10
Payer: MEDICARE

## 2024-12-10 DIAGNOSIS — R26.2 CANNOT WALK: ICD-10-CM

## 2024-12-10 PROCEDURE — 97530 THERAPEUTIC ACTIVITIES: CPT | Mod: GP | Performed by: PHYSICAL THERAPIST

## 2024-12-10 ASSESSMENT — PAIN SCALES - GENERAL: PAINLEVEL_OUTOF10: 4

## 2024-12-10 ASSESSMENT — PAIN - FUNCTIONAL ASSESSMENT: PAIN_FUNCTIONAL_ASSESSMENT: 0-10

## 2024-12-10 NOTE — PROGRESS NOTES
Physical Therapy Treatment    Patient Name: Edilson Cantu  MRN: [id number]  Today's Date: 12/10/2024    Current Problem  Problem List Items Addressed This Visit    None  Visit Diagnoses         Codes    Cannot walk     R26.2            Insurance:  Payor: [employer] / Plan: [employer] / Product Type: *No Product type* /   Number of Treatments Authorized: 16/20  Certification Period Start Date: 11/13/24  Certification Period End Date: 02/12/25    Subjective   General  Reason for Referral: DIFFICULTY WALKING  Referred By: Dr Landeros  Past Medical History Relevant to Rehab: PD dx in 2020. Taking CD/LD every 3 hours.  General Comment: Has been fit for AFO last week. Reports no falls x 3 days - then fall today without injury.  Having difficulty getting shoe on with AFO.    Performing HEP?: Yes    Precautions  Precautions  Precautions Comment: high fall risk, post op R TKR  Pain  Pain Assessment: 0-10  0-10 (Numeric) Pain Score: 4  Pain Type: Chronic pain  Pain Location: Back  Pain Orientation: Lower    Objective   Arrives with rollator and wearing AFO. Demonstrates increased stability of gait with AFO.  Treatments:    Therapeutic Exercise  Therapeutic Exercise Activity 4: Seated rows - BTB 2 x 20  Therapeutic Exercise Activity 6: TG: level 5 - 2 x 10 - hip flexion ( 1 ALT)  - PT min assist for R LE.  Therapeutic Exercise Activity 7: TG: level 5 - 2 min    Therapeutic Activity  Therapeutic Activity 1: Troubleshooting donning shoe with AFO - need for proper alignment when pushing foot into shoe, loosening or elastic shoe laces, use of shoe horn. Several reps.  Therapeutic Activity 2: Sit to stand with 1/2 step while naming colors - x 5 - B  Therapeutic Activity 3: Plinth to plinth walking as if in [employer] at 3 and 5ft widths.  Performed without complexities, performed with carrying items, performed with cup of water, performed with items on plate, performed with story telling while carrying items.    OP  "EDUCATION:  Outpatient Education  Education Comment: Education re: importance of \"stacked posture\" when moving in his home from counter to counter and during transfers to avoid falls. Edu re: continued need for rollator.    Assessment:  PT Assessment  Assessment Comment: Patient better able to don shoe with brace on following practice and \"tips.\" Patient will still need add'l practice and trouble shooting in order to perform without assistance. Patient challenged with multi-tasking activities and finds them helpful to focus on movement and tasks at the same time. Will benefit from continued therapy towards goals.    Plan:  OP PT Plan  PT Frequency: 1 time per week  Duration: 3 months  Certification Period Start Date: 24  Certification Period End Date: 25  Number of Treatments Authorized:   Rehab Potential: Fair  Plan of Care Agreement: Patient    Goals:  Active       PT Problem       PT Goal 1       Start:  24    Expected End:  25         LT) Progress HEP and OMID within pt tolerance to goal attainment, including return to ADL/IADL and functional activities w/ less fall potential/improved safety as measured by 5 point improvement in MiniBEST test:  PROGRESSED TOWARD:  (on 24) vs 8 (on 24)  2)  Pt. will have improved static balance ability as evidenced by performing static standing for 2 minutes on foam surface and to demonstrate improved dynamic balance by performing LSVT modified standing forward step, side step and backwards step independently without falls.   3)  Pt. will have safe, independent ambulation with safest/least restrictive assistive device for community distances and stairs/curbs with normalized gait pattern.  PROGRESSING TOWARDS. Continues to run into walls with rollator and have poor L foot/ankle control.   4) Pt. will have at least 8 point improvement in functional test score- MiniBEST test to > 15/28 to demonstrate reduced fall risk. PROGRESSING " TOWARD. Scored 12/28 on 11/13/24.  5)  Pt will report no falling for 1 week to demonstrate significant progress in safe home and community access, understanding of movement principles specific to PD related problems of multi-tasking in addition to his many orthopedic complications by discharge          Patient Stated Goal 1       Start:  02/05/24    Expected End:  02/13/25       1) Safely perform all daily activities without falling for 3-4 weeks by using appropriate device and movement strategies by discharge.  PROGRESSING TOWARDS.  Patient reporting fewer falling episodes. Reports he was previously falling multiple times a day, but now reporting falling less often - sometimes going full days without falling.               Time Calculation  Start Time: 1400  Stop Time: 1445  Time Calculation (min): 45 min  PT Therapeutic Procedures Time Entry  Therapeutic Exercise Time Entry: 5  Therapeutic Activity Time Entry: 38,

## 2024-12-17 ENCOUNTER — APPOINTMENT (OUTPATIENT)
Dept: PHYSICAL THERAPY | Facility: CLINIC | Age: 74
End: 2024-12-17
Payer: MEDICARE

## 2024-12-19 ENCOUNTER — TREATMENT (OUTPATIENT)
Dept: PHYSICAL THERAPY | Facility: CLINIC | Age: 74
End: 2024-12-19
Payer: MEDICARE

## 2024-12-19 DIAGNOSIS — M62.81 MUSCLE WEAKNESS-GENERAL: ICD-10-CM

## 2024-12-19 DIAGNOSIS — M17.9 KNEE OSTEOARTHRITIS: Primary | ICD-10-CM

## 2024-12-19 DIAGNOSIS — G20.A1 PARKINSON'S DISEASE, UNSPECIFIED WHETHER DYSKINESIA PRESENT, UNSPECIFIED WHETHER MANIFESTATIONS FLUCTUATE: ICD-10-CM

## 2024-12-19 DIAGNOSIS — M48.07 LUMBOSACRAL SPINAL STENOSIS: ICD-10-CM

## 2024-12-19 DIAGNOSIS — M21.372 LEFT FOOT DROP: ICD-10-CM

## 2024-12-19 DIAGNOSIS — R26.2 CANNOT WALK: ICD-10-CM

## 2024-12-19 PROCEDURE — 97110 THERAPEUTIC EXERCISES: CPT | Mod: GP | Performed by: PHYSICAL THERAPIST

## 2024-12-19 PROCEDURE — 97530 THERAPEUTIC ACTIVITIES: CPT | Mod: GP | Performed by: PHYSICAL THERAPIST

## 2024-12-19 ASSESSMENT — PAIN - FUNCTIONAL ASSESSMENT: PAIN_FUNCTIONAL_ASSESSMENT: 0-10

## 2024-12-19 ASSESSMENT — PAIN SCALES - GENERAL: PAINLEVEL_OUTOF10: 3

## 2024-12-19 NOTE — PROGRESS NOTES
"Physical Therapy Treatment    Patient Name: Edilson Cantu  MRN: [id number]  Today's Date: 12/19/2024    Current Problem  Problem List Items Addressed This Visit             ICD-10-CM    Knee osteoarthritis - Primary M17.9    Left foot drop M21.372    Lumbosacral spinal stenosis M48.07    Muscle weakness-general M62.81    Parkinson's disease (Multi) G20.A1     Other Visit Diagnoses         Codes    Cannot walk     R26.2            Insurance:  Payor: [employer] / Plan: [employer] / Product Type: *No Product type* /   Number of Treatments Authorized: 17/20  Certification Period Start Date: 11/13/24  Certification Period End Date: 02/12/25    Subjective : Having a bad day - unsure why.   General  Reason for Referral: DIFFICULTY WALKING  Referred By: Dr Landeros  Past Medical History Relevant to Rehab: PD dx in 2020. Taking CD/LD every 3 hours.  General Comment: Patient able to find \"in house\" slippers that fit the brace, still looking for different outdoor shoes. Reports that he is having a \"bad day,\" meaning difficulty with movement, transfers and feeling stable.    Performing HEP?: Yes    Precautions  Precautions  Precautions Comment: high fall risk, post op R TKR  Pain  Pain Assessment: 0-10  0-10 (Numeric) Pain Score: 3 (Stiffness at joints.)  Pain Type: Chronic pain  Pain Location:  (Back and knee)    Objective   Arrives using FWW - impulsive re: efforts to perform sit to stand. Initially unsuccessful - followed by \"rapid fire\" attempts.  Successful once \"reset.\"      Treatments:    Therapeutic Exercise  Therapeutic Exercise Activity 1: SciFit: level 2.5 - 11 min with 3 bouts of high intensity. (Bouts: 30 s/60 s = \"4/10 somewhat hard\")  Therapeutic Exercise Activity 4: Seated rows - BTB 2 x 20 (w/multi-task problem solving.)  Therapeutic Exercise Activity 6: TG: level 5 - 2 x 10 - hip flexion ( 1 ALT)  - PT min assist for R LE. (with multi-task of problem solving.)  Therapeutic Exercise Activity 7: TG: " "level 5 - 2 min (w/multi-task problem solving.)  Therapeutic Exercise Activity 10: Lars head press: 5 hold x 8  Therapeutic Exercise Activity 11: Lars shoulder press: 5 hold x 8        Therapeutic Activity  Therapeutic Activity 2: Sit to stand with 1/2 step while naming colors - x 5 - B  Therapeutic Activity 3: See education    OP EDUCATION:  Outpatient Education  Education Comment: Edu re: \"bad day management.\"  Patient instructed to be more intensional about movements and the need to \"work into a positive loop of intension\" through slowing his thought process down, resetting his frame of mind to prioritizing higher level of intensity. Instructed that by having a singular focus on movement, this will improve his day.  Patient indicates understanding.  Instructed to focus on this today and will report on how he does managing the holidays with this strategy.    Assessment:  PT Assessment  Assessment Comment: Patient continues to use AFO sucessfully - able to don and doff with min assist. Reports being more steady with AFO.  He continues to experience falling, but is no longer falling multiple times each day.  Troubleshooting problems of multi-tasking during ADL - especially in kitchen. Will benefit from continued therapy to address long standing weakness, movement disorders associated with PD, knee and back pain and falls.    Plan:  OP PT Plan  PT Plan:  (Plan for routine while patient is traveling with family in mid-January for safe airport, hotel negociation as well as exercise routine.)  PT Frequency: 1 time per week  Duration: 3 months  Certification Period Start Date: 24  Certification Period End Date: 25  Number of Treatments Authorized:   Rehab Potential: Fair  Plan of Care Agreement: Patient    Goals:  Active       PT Problem       PT Goal 1       Start:  24    Expected End:  25         LT) Progress HEP and OMID within pt tolerance to goal attainment, including return to " ADL/IADL and functional activities w/ less fall potential/improved safety as measured by 5 point improvement in MiniBEST test:  PROGRESSED TOWARD: 12/28 (on 11/13/24) vs 8/28 (on 9/13/24)  2)  Pt. will have improved static balance ability as evidenced by performing static standing for 2 minutes on foam surface and to demonstrate improved dynamic balance by performing LSVT modified standing forward step, side step and backwards step independently without falls.   3)  Pt. will have safe, independent ambulation with safest/least restrictive assistive device for community distances and stairs/curbs with normalized gait pattern.  PROGRESSING TOWARDS. Continues to run into walls with rollator and have poor L foot/ankle control.   4) Pt. will have at least 8 point improvement in functional test score- MiniBEST test to > 15/28 to demonstrate reduced fall risk. PROGRESSING TOWARD. Scored 12/28 on 11/13/24.  5)  Pt will report no falling for 1 week to demonstrate significant progress in safe home and community access, understanding of movement principles specific to PD related problems of multi-tasking in addition to his many orthopedic complications by discharge          Patient Stated Goal 1       Start:  02/05/24    Expected End:  02/13/25       1) Safely perform all daily activities without falling for 3-4 weeks by using appropriate device and movement strategies by discharge.  PROGRESSING TOWARDS.  Patient reporting fewer falling episodes. Reports he was previously falling multiple times a day, but now reporting falling less often - sometimes going full days without falling.               Time Calculation  Start Time: 1045  Stop Time: 1130  Time Calculation (min): 45 min  PT Therapeutic Procedures Time Entry  Therapeutic Exercise Time Entry: 25  Therapeutic Activity Time Entry: 13,

## 2025-01-03 ENCOUNTER — OFFICE VISIT (OUTPATIENT)
Dept: PRIMARY CARE | Facility: CLINIC | Age: 75
End: 2025-01-03
Payer: MEDICARE

## 2025-01-03 VITALS
WEIGHT: 155 LBS | BODY MASS INDEX: 22.89 KG/M2 | TEMPERATURE: 97.9 F | HEART RATE: 86 BPM | OXYGEN SATURATION: 94 % | SYSTOLIC BLOOD PRESSURE: 121 MMHG | DIASTOLIC BLOOD PRESSURE: 76 MMHG

## 2025-01-03 DIAGNOSIS — I10 PRIMARY HYPERTENSION: ICD-10-CM

## 2025-01-03 DIAGNOSIS — B97.4 ACUTE UPPER RESPIRATORY INFECTION DUE TO RESPIRATORY SYNCYTIAL VIRUS (RSV): Primary | ICD-10-CM

## 2025-01-03 DIAGNOSIS — J06.9 ACUTE UPPER RESPIRATORY INFECTION DUE TO RESPIRATORY SYNCYTIAL VIRUS (RSV): Primary | ICD-10-CM

## 2025-01-03 PROCEDURE — 1036F TOBACCO NON-USER: CPT | Performed by: INTERNAL MEDICINE

## 2025-01-03 PROCEDURE — 1157F ADVNC CARE PLAN IN RCRD: CPT | Performed by: INTERNAL MEDICINE

## 2025-01-03 PROCEDURE — 1160F RVW MEDS BY RX/DR IN RCRD: CPT | Performed by: INTERNAL MEDICINE

## 2025-01-03 PROCEDURE — 3074F SYST BP LT 130 MM HG: CPT | Performed by: INTERNAL MEDICINE

## 2025-01-03 PROCEDURE — 1123F ACP DISCUSS/DSCN MKR DOCD: CPT | Performed by: INTERNAL MEDICINE

## 2025-01-03 PROCEDURE — 1159F MED LIST DOCD IN RCRD: CPT | Performed by: INTERNAL MEDICINE

## 2025-01-03 PROCEDURE — 99213 OFFICE O/P EST LOW 20 MIN: CPT | Performed by: INTERNAL MEDICINE

## 2025-01-03 PROCEDURE — 3078F DIAST BP <80 MM HG: CPT | Performed by: INTERNAL MEDICINE

## 2025-01-03 RX ORDER — METHYLPREDNISOLONE 4 MG/1
TABLET ORAL
Qty: 21 TABLET | Refills: 0 | Status: SHIPPED | OUTPATIENT
Start: 2025-01-03 | End: 2025-01-09

## 2025-01-03 RX ORDER — OXYMETAZOLINE HYDROCHLORIDE 0.05 G/100ML
2 SPRAY, METERED NASAL EVERY 12 HOURS PRN
Qty: 30 ML | Refills: 0 | Status: SHIPPED | OUTPATIENT
Start: 2025-01-03 | End: 2025-01-05

## 2025-01-03 NOTE — PROGRESS NOTES
Subjective   Patient ID: Edilson Cantu is a 74 y.o. male who presents for Cough and Nasal Congestion.    HPI       Patient is a 74-year-old male with past medical history of hypertension Parkinson's disorder spinal canal stenosis who presents for sick visit    Patient complaining of sinus pressure nasal congestion going on for 4 days.  No fevers or shortness of breath.  He does have a cough that slightly productive with clear sputum.  No alleviating factors.  No shortness of breath on exertion    Review of Systems  Constitutional: No fever or chills  Cardiovascular: no chest pain, no palpitations and no syncope.   Respiratory: cough, no shortness of breath during exertion and no shortness of breath at rest.   Gastrointestinal: no abdominal pain, no nausea and no vomiting.  Neuro: No Headache, no dizziness    Objective   /76   Pulse 86   Temp 36.6 °C (97.9 °F)   Wt 70.3 kg (155 lb)   SpO2 94%   BMI 22.89 kg/m²     Physical Exam  Constitutional: Alert and in no acute distress. Well developed, well nourished  Head and Face: Head and face: Normal.    Cardiovascular: Heart rate and rhythm were normal, normal S1 and S2. No peripheral edema.   Pulmonary: No respiratory distress. Clear bilateral breath sounds.  Musculoskeletal: Gait and station: Normal. Muscle strength/tone: Normal.   Skin: Normal skin color and pigmentation, normal skin turgor, and no rash.    Psychiatric: Judgment and insight: Intact. Mood and affect: Normal.    Procedures    Lab Results   Component Value Date    WBC 5.4 05/09/2024    HGB 13.2 (L) 05/09/2024    HCT 38.4 (L) 05/09/2024     05/09/2024    CHOL 177 09/26/2024    TRIG 51 09/26/2024    HDL 63.7 09/26/2024    ALT 5 (L) 05/09/2024    AST 17 05/09/2024     05/09/2024    K 3.6 05/09/2024     05/09/2024    CREATININE 0.85 05/09/2024    BUN 18 05/09/2024    CO2 29 05/09/2024    TSH 1.92 03/21/2023    PSA 0.81 09/01/2020    GLUF 90 09/18/2019    HGBA1C 5.3 09/18/2019        XR knee left 4+ views  Narrative: Interpreted By:  Magdalena Arredondo,   STUDY:  Left knee, four views.      INDICATION:  Signs/Symptoms:knee pain.      COMPARISON:  10/13/2022.      ACCESSION NUMBER(S):  [id number]      ORDERING CLINICIAN:  JORDY LIMA      FINDINGS:  No acute fracture. Valgus angulation of the knee.      Severe lateral compartment osteoarthrosis with joint space loss and  osteophytes. Moderate patellofemoral and mild medial compartment  osteoarthrosis as well.      No significant knee joint effusion. Small loose bodies in the  posterior knee joint. There is soft tissue density adjacent to the  medial tibial plateau on the AP view which may represent MCL  bursitis. Osteochondral bodies are visualized within this soft tissue  density. Large ossified body in the posterior aspect of the proximal  fibula likely representing heterotopic ossification.          AP view radiograph of the right knee demonstrates changes of knee  arthroplasty with intact hardware.      Impression: 1. Severe lateral compartment pronounced degenerative changes of the  left knee with valgus angulation.  2. There is soft tissue density adjacent to the medial tibial plateau  on the AP view which may represent MCL bursitis.      MACRO:  None.      Signed by: Magdalena Arredondo 10/31/2024 7:12 AM  Dictation workstation:   [id number]  XR hips bilateral 5+ VW w pelvis when performed  Narrative: Interpreted By:  Magdalena Arredondo,   STUDY:  Single view pelvis.  Two views right hip. Two views left hip.      INDICATION:  Signs/Symptoms:hip pain.      COMPARISON:  09/17/2019.      ACCESSION NUMBER(S):  [id number]      ORDERING CLINICIAN:  JORDY LIMA      FINDINGS:  No acute fracture or malalignment.  Bilateral hip joint spaces are well preserved.  Mild bilateral hip osteoarthrosis with acetabular osteophytes.  Severe lower lumbar spine degenerative changes at L3-4, L4-5 and  L5-S1. Soft tissues are within normal limits.       Impression: 1. Severe lower lumbar spondylosis with interval worsening since 2019.  2. Mild bilateral hip osteoarthrosis.      MACRO:  None.      Signed by: Magdalena Arredondo 10/31/2024 7:10 AM  Dictation workstation:   [id number]            Assessment/Plan   Problem List Items Addressed This Visit             ICD-10-CM    HTN (hypertension) I10     Controlled on current medications.  No medication adjustments.          Other Visit Diagnoses         Codes    Acute upper respiratory infection due to respiratory syncytial virus (RSV)    -  Primary J06.9, B97.4    Relevant Medications    methylPREDNISolone (Medrol Dospak) 4 mg tablets    oxymetazoline (Afrin, oxymetazoline,) 0.05 % nasal spray          Likely viral upper respiratory tract infection.  Recommend Afrin twice daily for no more than 3 days.  Start Medrol.  Robitussin DM as needed for congestion.  Follow-up if no improvement in 5 days or if you develop shortness of breath

## 2025-01-14 ENCOUNTER — TREATMENT (OUTPATIENT)
Dept: PHYSICAL THERAPY | Facility: CLINIC | Age: 75
End: 2025-01-14
Payer: MEDICARE

## 2025-01-14 DIAGNOSIS — G20.A1 PARKINSON'S DISEASE, UNSPECIFIED WHETHER DYSKINESIA PRESENT, UNSPECIFIED WHETHER MANIFESTATIONS FLUCTUATE: ICD-10-CM

## 2025-01-14 DIAGNOSIS — M21.372 LEFT FOOT DROP: ICD-10-CM

## 2025-01-14 DIAGNOSIS — M48.07 LUMBOSACRAL SPINAL STENOSIS: ICD-10-CM

## 2025-01-14 DIAGNOSIS — M17.9 KNEE OSTEOARTHRITIS: Primary | ICD-10-CM

## 2025-01-14 DIAGNOSIS — M62.81 MUSCLE WEAKNESS-GENERAL: ICD-10-CM

## 2025-01-14 PROCEDURE — 97112 NEUROMUSCULAR REEDUCATION: CPT | Mod: GP | Performed by: PHYSICAL THERAPIST

## 2025-01-14 PROCEDURE — 97110 THERAPEUTIC EXERCISES: CPT | Mod: GP | Performed by: PHYSICAL THERAPIST

## 2025-01-14 ASSESSMENT — PAIN - FUNCTIONAL ASSESSMENT: PAIN_FUNCTIONAL_ASSESSMENT: 0-10

## 2025-01-14 ASSESSMENT — PAIN SCALES - GENERAL: PAINLEVEL_OUTOF10: 2

## 2025-01-14 NOTE — PROGRESS NOTES
Physical Therapy Treatment    Patient Name: Edilson Cantu  MRN: [id number]  Today's Date: 1/14/2025    Current Problem  Problem List Items Addressed This Visit             ICD-10-CM    Knee osteoarthritis - Primary M17.9    Left foot drop M21.372    Lumbosacral spinal stenosis M48.07    Muscle weakness-general M62.81    Parkinson's disease (Multi) G20.A1       Insurance:  Payor: [employer] / Plan: [employer] / Product Type: *No Product type* /   Number of Treatments Authorized: 18/20  Certification Period Start Date: 11/13/24  Certification Period End Date: 02/12/25    Subjective   General  Reason for Referral: DIFFICULTY WALKING  Referred By: Dr Landeros  Past Medical History Relevant to Rehab: PD dx in 2020. Taking CD/LD every 3 hours.  General Comment: Has been feeling a little better from recent illness.  Falling during transitions. Using cane for stairs (no falls while on stairs) , moving in kitchen, Fall frequency currently 1-2 x a day. Some days without falls.   Back with less pain generally.  R knee pain is sore from falls (TKA 2023)  Performing HEP?: Yes    Precautions  Precautions  Precautions Comment: high fall risk, post op R TKR  Pain  Pain Assessment: 0-10  0-10 (Numeric) Pain Score: 2  Pain Type: Chronic pain  Pain Location: Knee  Pain Orientation: Right    Objective   Treatments:    Therapeutic Exercise  Therapeutic Exercise Activity 1: SciFit: level 3 - 10 min at ~ 90 RPM's  Therapeutic Exercise Activity 2: Standing hip Abd: ALT x 10  Therapeutic Exercise Activity 3: Standing hip Ext: ALT x 10  Therapeutic Exercise Activity 4: Seated hip knee press: BTB x 10 - Ea  Therapeutic Exercise Activity 6: TG: level 5 - 2 x 10 - hip flexion ( 1 ALT)  - PT min assist for R LE. (with multi-task of story telling)  Therapeutic Exercise Activity 7: TG: level 6 - 2 min  Therapeutic Exercise Activity 8: TG- Rotation iso's - 5 hold x 5 - L/R  Therapeutic Exercise Activity 9: Supine head press, shoulder  press and GS : 5 hold x 5  Therapeutic Exercise Activity 10: SLR: R 2 x 5 / L x 10    Balance/Neuromuscular Re-Education  Balance/Neuromuscular Re-Education Activity 1: Supine trunk iso's: RTB with perturbations x 1 min  Balance/Neuromuscular Re-Education Activity 2: Supine ball FWD/BWD: x 1 min - assist for centering      OP EDUCATION:  Outpatient Education  Education Comment: Edu  re: wearing AFO for 4 hours daily minimum. Continued cues re: problems with multi-tasking and moving with singular focus.    Assessment:  PT Assessment  Assessment Comment: Patient continues to use AFO sucessfully - able to don and doff with min assist. Reports being more steady with AFO.  He continues to experience falling, but is no longer falling as many times a day. Finds he is using his cane more often.   Continue troubleshooting problems of multi-tasking during ADL - especially in kitchen. Will benefit from continued therapy to address long standing weakness, movement disorders associated with PD, knee and back pain and falls. Recheck next visit.    Plan:  OP PT Plan  PT Frequency: 1 time per week  Duration: 3 months  Certification Period Start Date: 24  Certification Period End Date: 25  Number of Treatments Authorized:   Rehab Potential: Fair  Plan of Care Agreement: Patient  Recheck next. MiniBest.  Goals:  Active       PT Problem       PT Goal 1       Start:  24    Expected End:  25         LT) Progress HEP and OMID within pt tolerance to goal attainment, including return to ADL/IADL and functional activities w/ less fall potential/improved safety as measured by 5 point improvement in MiniBEST test:  PROGRESSED TOWARD:  (on 24) vs  (on 24)  2)  Pt. will have improved static balance ability as evidenced by performing static standing for 2 minutes on foam surface and to demonstrate improved dynamic balance by performing LSVT modified standing forward step, side step and  backwards step independently without falls.   3)  Pt. will have safe, independent ambulation with safest/least restrictive assistive device for community distances and stairs/curbs with normalized gait pattern.  PROGRESSING TOWARDS. Continues to run into walls with rollator and have poor L foot/ankle control.   4) Pt. will have at least 8 point improvement in functional test score- MiniBEST test to > 15/28 to demonstrate reduced fall risk. PROGRESSING TOWARD. Scored 12/28 on 11/13/24.  5)  Pt will report no falling for 1 week to demonstrate significant progress in safe home and community access, understanding of movement principles specific to PD related problems of multi-tasking in addition to his many orthopedic complications by discharge          Patient Stated Goal 1       Start:  02/05/24    Expected End:  02/13/25       1) Safely perform all daily activities without falling for 3-4 weeks by using appropriate device and movement strategies by discharge.  PROGRESSING TOWARDS.  Patient reporting fewer falling episodes. Reports he was previously falling multiple times a day, but now reporting falling less often - sometimes going full days without falling.               Time Calculation  Start Time: 1130  Stop Time: 1215  Time Calculation (min): 45 min  PT Therapeutic Procedures Time Entry  Neuromuscular Re-Education Time Entry: 5  Therapeutic Exercise Time Entry: 35,

## 2025-01-28 ENCOUNTER — TREATMENT (OUTPATIENT)
Dept: PHYSICAL THERAPY | Facility: CLINIC | Age: 75
End: 2025-01-28
Payer: MEDICARE

## 2025-01-28 DIAGNOSIS — M17.9 KNEE OSTEOARTHRITIS: Primary | ICD-10-CM

## 2025-01-28 DIAGNOSIS — M48.07 LUMBOSACRAL SPINAL STENOSIS: ICD-10-CM

## 2025-01-28 DIAGNOSIS — M62.81 MUSCLE WEAKNESS-GENERAL: ICD-10-CM

## 2025-01-28 DIAGNOSIS — G20.A1 PARKINSON'S DISEASE, UNSPECIFIED WHETHER DYSKINESIA PRESENT, UNSPECIFIED WHETHER MANIFESTATIONS FLUCTUATE: ICD-10-CM

## 2025-01-28 DIAGNOSIS — M21.372 LEFT FOOT DROP: ICD-10-CM

## 2025-01-28 PROCEDURE — 97110 THERAPEUTIC EXERCISES: CPT | Mod: GP | Performed by: PHYSICAL THERAPIST

## 2025-01-28 ASSESSMENT — PAIN - FUNCTIONAL ASSESSMENT: PAIN_FUNCTIONAL_ASSESSMENT: 0-10

## 2025-01-28 ASSESSMENT — PAIN SCALES - GENERAL: PAINLEVEL_OUTOF10: 5 - MODERATE PAIN

## 2025-01-28 NOTE — PROGRESS NOTES
Physical Therapy Treatment    Patient Name: Edilson Cantu  MRN: [id number]  Today's Date: 1/28/2025    Current Problem  Problem List Items Addressed This Visit             ICD-10-CM    Knee osteoarthritis - Primary M17.9    Left foot drop M21.372    Lumbosacral spinal stenosis M48.07    Muscle weakness-general M62.81    Parkinson's disease (Multi) G20.A1       Insurance:  Payor: [employer] / Plan: [employer] / Product Type: *No Product type* /   Number of Treatments Authorized: 19/20  Certification Period Start Date: 11/13/24  Certification Period End Date: 02/12/25    Subjective   General  Reason for Referral: DIFFICULTY WALKING  Referred By: Dr Landeros  Past Medical History Relevant to Rehab: PD dx in 2020. Taking CD/LD every 3 hours.  General Comment: Back from travel to see family. Experienced several falls getting ready to leave and while away. Did not perform exercises often.  No injury from falls.    Performing HEP?: Partially    Precautions  Precautions  Precautions Comment: high fall risk, post op R TKR  Pain  Pain Assessment: 0-10  0-10 (Numeric) Pain Score: 5 - Moderate pain  Pain Type: Chronic pain  Pain Location:  (Back and L knee)    Objective   Arrives using 4 wheeled walker. Poor LE motor control - poor pelvic control.  Limitations due to severe spinal stenosis with resultant proximal weakness as well as knee OA.     Treatments:    Therapeutic Exercise  Therapeutic Exercise Activity 1: SciFit: level 3 - 10 min at ~ 90 RPM's  Therapeutic Exercise Activity 5: Shoulder ER: YTB - 2 x 5  Therapeutic Exercise Activity 6: Lars: back iso lift x 10  Therapeutic Exercise Activity 7: Supine: hip abd iso's x 5 - B  Therapeutic Exercise Activity 8: Supine: bent knee hip abd x 10  Therapeutic Exercise Activity 9: Hip stability using small RTB on extended leg : x 10 - B  Therapeutic Exercise Activity 10: Leg press with RTB: x 10 - B  Attempted supine shoulder press with 3# - unable due to shoulder  "pain and RTC instability.      OP EDUCATION:  Outpatient Education  Education Comment: Edu re: need to \"set\" himself prior to movement in order to reduce fall risk. Instructed to move only from \"firm base.\" Edu re: \"rapid fire\" attempts to get up, move legs, etc is part of his festination /  movement disfunction and he needs to stop and rest to avoid falls.    Assessment:  PT Assessment  Assessment Comment: Patient has had an increase in his falling rate. Previously had been falling ~ 2-3 x a week and has returned to falling daily.  Patient and spouse uncertain of reason. States that he has not been injured in these falls. Appointment with neurologist was cxl'd by his office - Patient looking for another neurologist at this time. Patient also notes declining UE strength and is noting increased difficulty with transfers.  Significant shoulder OA and RTC insuffiency limit strengthening ability.    Plan:  OP PT Plan  PT Frequency: 1 time per week  Duration: 3 months  Certification Period Start Date: 24  Certification Period End Date: 25  Number of Treatments Authorized:   Rehab Potential: Fair  Plan of Care Agreement: Patient  Re-check/testing next visit. Add Falls to Dx list.   Goals:  Active       PT Problem       PT Goal 1       Start:  24    Expected End:  25         LT) Progress HEP and OMID within pt tolerance to goal attainment, including return to ADL/IADL and functional activities w/ less fall potential/improved safety as measured by 5 point improvement in MiniBEST test:  PROGRESSED TOWARD:  (on 24) vs  (on 24)  2)  Pt. will have improved static balance ability as evidenced by performing static standing for 2 minutes on foam surface and to demonstrate improved dynamic balance by performing LSVT modified standing forward step, side step and backwards step independently without falls.   3)  Pt. will have safe, independent ambulation with safest/least " restrictive assistive device for community distances and stairs/curbs with normalized gait pattern.  PROGRESSING TOWARDS. Continues to run into walls with rollator and have poor L foot/ankle control.   4) Pt. will have at least 8 point improvement in functional test score- MiniBEST test to > 15/28 to demonstrate reduced fall risk. PROGRESSING TOWARD. Scored 12/28 on 11/13/24.  5)  Pt will report no falling for 1 week to demonstrate significant progress in safe home and community access, understanding of movement principles specific to PD related problems of multi-tasking in addition to his many orthopedic complications by discharge          Patient Stated Goal 1       Start:  02/05/24    Expected End:  02/13/25       1) Safely perform all daily activities without falling for 3-4 weeks by using appropriate device and movement strategies by discharge.  PROGRESSING TOWARDS.  Patient reporting fewer falling episodes. Reports he was previously falling multiple times a day, but now reporting falling less often - sometimes going full days without falling.               Time Calculation  Start Time: 1130  Stop Time: 1215  Time Calculation (min): 45 min  PT Therapeutic Procedures Time Entry  Therapeutic Exercise Time Entry: 40,

## 2025-02-06 ENCOUNTER — APPOINTMENT (OUTPATIENT)
Dept: PRIMARY CARE | Facility: CLINIC | Age: 75
End: 2025-02-06
Payer: MEDICARE

## 2025-02-07 ENCOUNTER — TELEPHONE (OUTPATIENT)
Dept: NEUROLOGY | Facility: CLINIC | Age: 75
End: 2025-02-07
Payer: MEDICARE

## 2025-02-07 DIAGNOSIS — G20.A1 PARKINSON'S DISEASE, UNSPECIFIED WHETHER DYSKINESIA PRESENT, UNSPECIFIED WHETHER MANIFESTATIONS FLUCTUATE: Primary | ICD-10-CM

## 2025-02-11 ENCOUNTER — TREATMENT (OUTPATIENT)
Dept: PHYSICAL THERAPY | Facility: CLINIC | Age: 75
End: 2025-02-11
Payer: MEDICARE

## 2025-02-11 DIAGNOSIS — M48.07 LUMBOSACRAL SPINAL STENOSIS: ICD-10-CM

## 2025-02-11 DIAGNOSIS — M62.81 MUSCLE WEAKNESS-GENERAL: Primary | ICD-10-CM

## 2025-02-11 DIAGNOSIS — M17.9 KNEE OSTEOARTHRITIS: ICD-10-CM

## 2025-02-11 DIAGNOSIS — G20.A1 PARKINSON'S DISEASE, UNSPECIFIED WHETHER DYSKINESIA PRESENT, UNSPECIFIED WHETHER MANIFESTATIONS FLUCTUATE: ICD-10-CM

## 2025-02-11 DIAGNOSIS — M21.372 LEFT FOOT DROP: ICD-10-CM

## 2025-02-11 PROCEDURE — 97110 THERAPEUTIC EXERCISES: CPT | Mod: GP | Performed by: PHYSICAL THERAPIST

## 2025-02-11 PROCEDURE — 97112 NEUROMUSCULAR REEDUCATION: CPT | Mod: GP | Performed by: PHYSICAL THERAPIST

## 2025-02-11 PROCEDURE — 97530 THERAPEUTIC ACTIVITIES: CPT | Mod: GP | Performed by: PHYSICAL THERAPIST

## 2025-02-11 ASSESSMENT — PAIN - FUNCTIONAL ASSESSMENT: PAIN_FUNCTIONAL_ASSESSMENT: 0-10

## 2025-02-11 ASSESSMENT — PAIN SCALES - GENERAL: PAINLEVEL_OUTOF10: 5 - MODERATE PAIN

## 2025-02-11 NOTE — PROGRESS NOTES
Physical Therapy Treatment    Patient Name: Edilson Cantu  MRN: [id number]  Today's Date: 2025    Current Problem  Problem List Items Addressed This Visit             ICD-10-CM    Knee osteoarthritis M17.9    Left foot drop M21.372    Lumbosacral spinal stenosis M48.07    Muscle weakness-general - Primary M62.81    Parkinson's disease (Multi) G20.A1       Insurance:  Payor: [employer] / Plan: [employer] / Product Type: *No Product type* /   Number of Treatments Authorized:  (new POC upcoming)  Certification Period Start Date: 25  Certification Period End Date: 25    Subjective   General  Reason for Referral: DIFFICULTY WALKING  Referred By: Dr Landeros  Past Medical History Relevant to Rehab: PD dx in . Taking CD/LD every 3 hours.  General Comment: Has been feeling very unsteady for ~ 1 week. Reports falling in garage earlier today. Denies injury.  LOB backwards in standing. Dynamic balance is also worse, tends to move Left.  Falling more often. Car transfers more difficult, chair and bed transfers unchanged. To see Dr. Moe.    Performing HEP?: Yes    Precautions  Precautions  Precautions Comment: high fall risk, post op R TKR  Pain  Pain Assessment: 0-10  0-10 (Numeric) Pain Score: 5 - Moderate pain  Pain Type: Chronic pain  Pain Location: Back    Objective     Outcome Measures:  MiniBESTest:   Anticipatory:   1,0,0 = 1  Reactive postural control = 0  Sensory orientation: 2,1,0 = 3  Dynamic gait: 2,1,1,0,0 = 4 w/rollator.  Total:  = 28.5%  (Cut off score for non-fallers is >69%)   *Previous score of  = 42%     TU seconds w/FWW  5 x sit to stand: 28 seconds with UE support  10 MWT: 14.5 sec with FWW.  mCTSIB: 30,30, 7,7 = 74/120 = 61%    Specific Lower Extremity MMT   R Iliopsoas: (5/5): 4-/5  L Iliopsoas: (5/5): 4+/5  R Gluteals (prone): (5/5): 3-/5  L Gluteals (prone): (5/5): 3-/5  R Gluteals (sidelying): (): 3-/5  L Gluteals (sidelying): ():  3/5    Knee AROM  R knee flexion: (140°): 118  L knee flexion: (140°): 116  R knee extension: (0°): 0  L knee extension: (0°): 0  Treatments:    Therapeutic Exercise  Therapeutic Exercise Activity 2: Bridges: x 10  Therapeutic Exercise Activity 3: Supine hip abd slides: x 20 -B  Therapeutic Exercise Activity 4: mClams GTB 2 x 10  Therapeutic Exercise Activity 6: Lars: back iso lift: 2 x 10    Balance/Neuromuscular Re-Education  Balance/Neuromuscular Re-Education Activity 1: Lars decompression  Balance/Neuromuscular Re-Education Activity 2: Lars head press 5 hold x 5  Balance/Neuromuscular Re-Education Activity 3: Lars shoulder pres: 5 hold x 5  Balance/Neuromuscular Re-Education Activity 4: Lars leg press: 5 hold x 5     Therapeutic Activity  Therapeutic Activity 1: See measures and goal status     OP EDUCATION:  Outpatient Education  Education Comment: Edu re: decline in TUG, 5 x sit to stand and MiniBest scores. Re: continued therapy.     Assessment:  PT Assessment  Assessment Comment: Patient has had an increase in his falling rate. Increase in retropulsion in static standing.  Previously had been falling ~ 2-3 x a week and has returned to falling daily.  Patient and spouse uncertain of reason. States that he has not been injured in these falls; so far. Appointment with neurologist upcoming. States he has been taking C-L Dopa without benefit.  Patient also notes declining UE strength and is noting increased difficulty with transfers.  Significant shoulder OA and RTC insuffiency limit strengthening ability. RECOMMEND ADD'L THERAPY DUE TO DECLINE IN FUNCTIONAL STATUS.    Plan:  OP PT Plan  PT Frequency: 1 time per week  Duration: 3 months  Certification Period Start Date: 02/11/25  Certification Period End Date: 05/12/25  Number of Treatments Authorized: 20/20 (new POC upcoming)  Rehab Potential: Fair  Plan of Care Agreement: Patient    Goals:  Active       PT Problem       PT Goal 1       Start:  02/05/24     Expected End:  25         LT) Progress HEP and OMID within pt tolerance to goal attainment, including return to ADL/IADL and functional activities w/ less fall potential/improved safety as measured by 5 point improvement in MiniBEST test:    8 (on 25) vs  12 (on 24) vs   8 (on 24)  2)  Pt. will have improved static balance ability as evidenced by performing static standing for 2 minutes on foam surface and to demonstrate improved dynamic balance by performing LSVT modified standing forward step, side step and backwards step independently without falls. NOT ACHIEVED.   3)  Pt. will have safe, independent ambulation with safest/least restrictive assistive device for community distances and stairs/curbs with normalized gait pattern.  PROGRESSING TOWARDS. DEMONSTRATES IMPROVED FOOT/ANKLE CONTROL W/AFO. PERFORMANCE VARIES WITH KNEE AND BACK PAIN.   4) Pt. will have at least 8 point improvement in functional test score- MiniBEST test to > 15/ to demonstrate reduced fall risk. PROGRESSING TOWARD. SEE ABOVE FOR SCORES. CURRENTLY .   5)  Pt will report no falling for 1 week to demonstrate significant progress in safe home and community access, understanding of movement principles specific to PD related problems of multi-tasking in addition to his many orthopedic complications by discharge. NOT ACHIEVED.          Patient Stated Goal 1       Start:  24    Expected End:  25       1) Safely perform all daily activities without falling for 3-4 weeks by using appropriate device and movement strategies by discharge.    PERFORMANCE VARIES.  Had been falling 2-3 x a week, now he has returned to falling daily. Recommend add'l therapy to address retropulsion.               Time Calculation  Start Time: 1630  Stop Time: 1730  Time Calculation (min): 60 min  PT Therapeutic Procedures Time Entry  Neuromuscular Re-Education Time Entry: 10  Therapeutic Exercise Time Entry:  10  Therapeutic Activity Time Entry: 33,

## 2025-02-13 ENCOUNTER — APPOINTMENT (OUTPATIENT)
Dept: NEUROLOGY | Facility: CLINIC | Age: 75
End: 2025-02-13
Payer: MEDICARE

## 2025-02-27 ENCOUNTER — TREATMENT (OUTPATIENT)
Dept: PHYSICAL THERAPY | Facility: CLINIC | Age: 75
End: 2025-02-27
Payer: MEDICARE

## 2025-02-27 DIAGNOSIS — G20.A1 PARKINSON'S DISEASE, UNSPECIFIED WHETHER DYSKINESIA PRESENT, UNSPECIFIED WHETHER MANIFESTATIONS FLUCTUATE: ICD-10-CM

## 2025-02-27 DIAGNOSIS — M62.81 MUSCLE WEAKNESS-GENERAL: ICD-10-CM

## 2025-02-27 DIAGNOSIS — M48.07 LUMBOSACRAL SPINAL STENOSIS: ICD-10-CM

## 2025-02-27 DIAGNOSIS — M17.9 KNEE OSTEOARTHRITIS: Primary | ICD-10-CM

## 2025-02-27 DIAGNOSIS — M21.372 LEFT FOOT DROP: ICD-10-CM

## 2025-02-27 PROCEDURE — 97530 THERAPEUTIC ACTIVITIES: CPT | Mod: GP | Performed by: PHYSICAL THERAPIST

## 2025-02-27 PROCEDURE — 97110 THERAPEUTIC EXERCISES: CPT | Mod: GP | Performed by: PHYSICAL THERAPIST

## 2025-02-27 ASSESSMENT — PAIN SCALES - GENERAL: PAINLEVEL_OUTOF10: 4

## 2025-02-27 ASSESSMENT — PAIN - FUNCTIONAL ASSESSMENT: PAIN_FUNCTIONAL_ASSESSMENT: 0-10

## 2025-02-27 NOTE — PROGRESS NOTES
"Physical Therapy Treatment    Patient Name: Edilson Cantu  MRN: [id number]  Today's Date: 2/27/2025    Current Problem  Problem List Items Addressed This Visit             ICD-10-CM    Knee osteoarthritis - Primary M17.9    Left foot drop M21.372    Lumbosacral spinal stenosis M48.07    Muscle weakness-general M62.81    Parkinson's disease (Multi) G20.A1       Insurance:  Payor: [facility] MEDICARE / Plan: [employer] / Product Type: *No Product type* /   Number of Treatments Authorized: 21/30  Certification Period Start Date: 02/11/25  Certification Period End Date: 05/12/25    Subjective   General  Reason for Referral: DIFFICULTY WALKING  Referred By: Dr Landeros  Past Medical History Relevant to Rehab: PD dx in 2020. Taking CD/LD every 3 hours.  General Comment: Has been suffering several falls a day. No injury. R foot seems to catch when moving in small spaces. Left knee buckling - \"but I catch myself\" most of the time. Intense back pain also playing a role.    Performing HEP?: Partially    Precautions  Precautions  Precautions Comment: high fall risk, post op R TKR  Pain  Pain Assessment: 0-10  0-10 (Numeric) Pain Score: 4  Pain Type: Chronic pain  Pain Location: Back    Objective     Treatments:    Therapeutic Exercise  Therapeutic Exercise Activity 1: Supine hip flexion: 2 x 10 - B  Therapeutic Exercise Activity 2: Bridges: x 10  Therapeutic Exercise Activity 3: Sidelying clamshells: 2 x 10  Therapeutic Exercise Activity 4: Sidelying reverse clamshells: 2 x 10 - B - PT assist  Therapeutic Exercise Activity 5: Rows  x 20  GTB    Balance/Neuromuscular Re-Education  Balance/Neuromuscular Re-Education Activity 2: Toy head press: 5 hold x 5  Balance/Neuromuscular Re-Education Activity 3: Lars shoulder press: 5 hold x 5  Balance/Neuromuscular Re-Education Activity 4: Lars: isolated back lift: 5 hold x 10     Therapeutic Activity  Therapeutic Activity 2: Step and hold: R x 10 - L 2 x 5  Therapeutic Activity 3: " Plinth to plinth walking  Therapeutic Activity 4: Chair to chair - walking  Therapeutic Activity 5: Push/eccentrics vs PT  Therapeutic Activity 6: Gait with FWW - 80 ft x 2 - cues to for posture    OP EDUCATION:  Outpatient Education  Education Comment: Edu re: nature of festination with movements generally, not just with his feet that may be contributing to his falls.    Assessment:  PT Assessment  Assessment Comment: Patient continues to have several falls daily. Patient notes knee and back pain are playing a role as well as R foot not advancing well - espeically in small spaces. Also, having difficulty with sit to stand and car transfers.  Some back pain today with WB'ing exercises.    Plan:  OP PT Plan  PT Frequency: 1 time per week  Duration: 3 months  Certification Period Start Date: 25  Certification Period End Date: 25  Number of Treatments Authorized:   Rehab Potential: Fair  Plan of Care Agreement: Patient    Goals:  Active       PT Problem       PT Goal 1       Start:  24    Expected End:  25         LT) Progress HEP and OMID within pt tolerance to goal attainment, including return to ADL/IADL and functional activities w/ less fall potential/improved safety as measured by 5 point improvement in MiniBEST test:    8 (on 25) vs  12 (on 24) vs   8 (on 24)  2)  Pt. will have improved static balance ability as evidenced by performing static standing for 2 minutes on foam surface and to demonstrate improved dynamic balance by performing LSVT modified standing forward step, side step and backwards step independently without falls. NOT ACHIEVED.   3)  Pt. will have safe, independent ambulation with safest/least restrictive assistive device for community distances and stairs/curbs with normalized gait pattern.  PROGRESSING TOWARDS. DEMONSTRATES IMPROVED FOOT/ANKLE CONTROL W/AFO. PERFORMANCE VARIES WITH KNEE AND BACK PAIN.   4) Pt. will have at least 8 point  improvement in functional test score- MiniBEST test to > 15/28 to demonstrate reduced fall risk. PROGRESSING TOWARD. SEE ABOVE FOR SCORES. CURRENTLY 8/28.   5)  Pt will report no falling for 1 week to demonstrate significant progress in safe home and community access, understanding of movement principles specific to PD related problems of multi-tasking in addition to his many orthopedic complications by discharge. NOT ACHIEVED.          Patient Stated Goal 1       Start:  02/05/24    Expected End:  02/13/25       1) Safely perform all daily activities without falling for 3-4 weeks by using appropriate device and movement strategies by discharge.    PERFORMANCE VARIES.  Had been falling 2-3 x a week, now he has returned to falling daily. Recommend add'l therapy to address retropulsion.               Time Calculation  Start Time: 1315  Stop Time: 1400  Time Calculation (min): 45 min  PT Therapeutic Procedures Time Entry  Neuromuscular Re-Education Time Entry: 5  Therapeutic Exercise Time Entry: 10  Therapeutic Activity Time Entry: 25,

## 2025-03-04 ENCOUNTER — DOCUMENTATION (OUTPATIENT)
Dept: PHYSICAL THERAPY | Facility: CLINIC | Age: 75
End: 2025-03-04
Payer: MEDICARE

## 2025-03-04 ENCOUNTER — APPOINTMENT (OUTPATIENT)
Dept: PHYSICAL THERAPY | Facility: CLINIC | Age: 75
End: 2025-03-04
Payer: MEDICARE

## 2025-03-04 DIAGNOSIS — G20.A1 PARKINSON'S DISEASE, UNSPECIFIED WHETHER DYSKINESIA PRESENT, UNSPECIFIED WHETHER MANIFESTATIONS FLUCTUATE: ICD-10-CM

## 2025-03-04 DIAGNOSIS — M62.81 MUSCLE WEAKNESS-GENERAL: ICD-10-CM

## 2025-03-04 DIAGNOSIS — M21.372 LEFT FOOT DROP: ICD-10-CM

## 2025-03-04 DIAGNOSIS — M48.07 LUMBOSACRAL SPINAL STENOSIS: ICD-10-CM

## 2025-03-04 DIAGNOSIS — M17.9 KNEE OSTEOARTHRITIS: Primary | ICD-10-CM

## 2025-03-04 NOTE — PROGRESS NOTES
Physical Therapy                 Therapy Communication Note    Patient Name: Edilson Cantu    Today's Date: 3/4/2025     Discipline: Physical Therapy        Patient's spouse alerted [facility] staff that patient is struggling today and may need the help of this therapist to enter the department. Patient found by elevators sitting in rollator.  Patient had walked to his car at home and to the elevator. He sat to rest and now unable to perform sit to stand from rollator. Patient denies pain, denies HA or difficulty breathing. Patient shows no signs of distress or confusion.  Reports swelling at L calf and foot/ankle.  Calf not warm to touch or painful.  Patient instructed to either contact his Md re: calf or to go to ER for assessment. States he has an appointment with ortho next week.  Encouraged not to wait due to risk of stroke, PE, etc.  Assisted client into w/c for assist to his vehicle - his spouse driving. Patient demonstrated good sit to stand transfer from w/c and transfer into his car.  Patient to follow up with this PT re: his care.       N/C

## 2025-03-05 ENCOUNTER — HOSPITAL ENCOUNTER (OUTPATIENT)
Dept: VASCULAR MEDICINE | Facility: HOSPITAL | Age: 75
Discharge: HOME | End: 2025-03-05
Payer: MEDICARE

## 2025-03-05 ENCOUNTER — OFFICE VISIT (OUTPATIENT)
Dept: ORTHOPEDIC SURGERY | Facility: CLINIC | Age: 75
End: 2025-03-05
Payer: MEDICARE

## 2025-03-05 DIAGNOSIS — M79.89 PAIN AND SWELLING OF LEFT LOWER EXTREMITY: Primary | ICD-10-CM

## 2025-03-05 DIAGNOSIS — M79.89 PAIN AND SWELLING OF LEFT LOWER EXTREMITY: ICD-10-CM

## 2025-03-05 DIAGNOSIS — M79.605 PAIN AND SWELLING OF LEFT LOWER EXTREMITY: Primary | ICD-10-CM

## 2025-03-05 DIAGNOSIS — M79.605 PAIN AND SWELLING OF LEFT LOWER EXTREMITY: ICD-10-CM

## 2025-03-05 PROCEDURE — 20610 DRAIN/INJ JOINT/BURSA W/O US: CPT | Performed by: ORTHOPAEDIC SURGERY

## 2025-03-05 PROCEDURE — 1036F TOBACCO NON-USER: CPT | Performed by: ORTHOPAEDIC SURGERY

## 2025-03-05 PROCEDURE — 1123F ACP DISCUSS/DSCN MKR DOCD: CPT | Performed by: ORTHOPAEDIC SURGERY

## 2025-03-05 PROCEDURE — 1160F RVW MEDS BY RX/DR IN RCRD: CPT | Performed by: ORTHOPAEDIC SURGERY

## 2025-03-05 PROCEDURE — 99214 OFFICE O/P EST MOD 30 MIN: CPT | Performed by: ORTHOPAEDIC SURGERY

## 2025-03-05 PROCEDURE — 1159F MED LIST DOCD IN RCRD: CPT | Performed by: ORTHOPAEDIC SURGERY

## 2025-03-05 PROCEDURE — 93971 EXTREMITY STUDY: CPT | Performed by: INTERNAL MEDICINE

## 2025-03-05 PROCEDURE — 93971 EXTREMITY STUDY: CPT

## 2025-03-05 PROCEDURE — 1125F AMNT PAIN NOTED PAIN PRSNT: CPT | Performed by: ORTHOPAEDIC SURGERY

## 2025-03-05 PROCEDURE — 1157F ADVNC CARE PLAN IN RCRD: CPT | Performed by: ORTHOPAEDIC SURGERY

## 2025-03-05 RX ORDER — TRIAMCINOLONE ACETONIDE 40 MG/ML
1 INJECTION, SUSPENSION INTRA-ARTICULAR; INTRAMUSCULAR
Status: COMPLETED | OUTPATIENT
Start: 2025-03-05 | End: 2025-03-05

## 2025-03-05 RX ADMIN — TRIAMCINOLONE ACETONIDE 1 ML: 40 INJECTION, SUSPENSION INTRA-ARTICULAR; INTRAMUSCULAR at 12:32

## 2025-03-05 ASSESSMENT — PAIN SCALES - GENERAL: PAINLEVEL_OUTOF10: 4

## 2025-03-05 ASSESSMENT — PAIN - FUNCTIONAL ASSESSMENT: PAIN_FUNCTIONAL_ASSESSMENT: 0-10

## 2025-03-05 NOTE — PROGRESS NOTES
This is a consultation from Dr. Juliano Weinstein DO for   Chief Complaint   Patient presents with    Left Knee - Pain       This is a 74 y.o. male who presents for evaluation of left knee pain.  Patient has left knee arthritis, he had a cortisone shot back in October.  Good relief for a while but in the last few weeks has had return episodes exacerbation of his pain, sharp pain over the medial and lateral knee worse with walking proving with rest.  He also noted after he was on his stationary bike a couple of weeks ago he had more swelling in his left calf.  Denies any pain in the calf, denies any shortness of breath or chest pain.    Physical Exam    There has been no interval change in this patient's past medical, surgical, medications, allergies, family history or social history since the most recent visit to a provider within our department. 14 point review of systems was performed, reviewed, and negative except for pertinent positives documented in the history of present illness.     Constitutional: well developed, well nourished male in no acute distress  Psychiatric: normal mood, appropriate affect  Eyes: sclera anicteric  HENT: normocephalic/atraumatic  CV: regular rate and rhythm   Respiratory: non labored breathing  Integumentary: no rash  Neurological: moves all extremities    Left knee exam: skin intact no lacerations or abrations. no effusion.  Tender medial and lateral joint line. negative log roll negative patellar grind. ROM 0-120. stable to varus and valgus stress at 0 and 30 degrees. negative lachman negative posterior drawer negative lupe. 5/5 ehl/fhl/gs/ta. silt s/s/sp/dp/t. 2+ dp/pt.  He has some edema of the left lower extremity distal to the knee, no calf tenderness.        Imaging:    L Inj/Asp: L knee on 3/5/2025 12:32 PM  Indications: pain and joint swelling  Details: 22 G needle, anterolateral approach  Medications: 1 mL triamcinolone acetonide 40 mg/mL    Discussion:  I discussed the  conservative treatment options for knee osteoarthritis including but not limited to physical therapy, oral NSAIDS, activity and lifestyle modification, and corticosteroid injections. Pt has elected to undergo a cortisone injection today. I have explained the risk and benefits of an injection including the possibility of joint infection, bleeding, damage to cartilage, allergic reaction. Patient verbalized understanding and gave verbal consent wishes to proceed with a intra-articular cortisone injection for their knee.    Procedure:  After discussing the risk and benefits of the procedure, we proceeded with an intra-articular left knee injection. We discussed the risks and benefits and potential morbidity related to the treatment, and to the prescription medication administered in the injection    With the patient's informed verbal consent, the left knee was prepped in standard sterile fashion with Chlorhexidine. The skin was then anesthetized with ethyl chloride spray and cleaned again with Chlorhexidine. The knee was then apirated/injected with a prefilled 20-gauge syringe of 40 mg Kenalog + 4 ml Lidocaine using the lateral approach without complications.  The patient tolerated this well and felt immediate initial relief of symptoms. A bandaid was applied and the patient ambulated out of the clinic on ther own accord without difficulty. Patient was instructed to avoid physical activity for 24-48 hours to prevent the knees from swelling and may ice the knees as tolerated. Patient should contact the office if any signs of of infection appear: redness, fever, chills, drainage, swelling or warmth to the knees.  Pt understands that the injections can be repeated no sooner than 3 months.  Procedure, treatment alternatives, risks and benefits explained, specific risks discussed. Consent was given by the patient. Immediately prior to procedure a time out was called to verify the correct patient, procedure, equipment,  support staff and site/side marked as required. Patient was prepped and draped in the usual sterile fashion.             Impression/Plan: This is a 74 y.o. male with left knee arthritis.  I had an in depth discussion with the patient regarding treatment options for arthritis and their relative risks and benefits. We reviewed surgical and nonsurgical option for treatment. Treatments include anti inflammatory medications, physical therapy, weight loss, activity modification, use of assistive devices, injection therapies. We discussed current prescriptions and risks and benefits of continuation of prescription medication as apporpriate. We discussed that arthritis is often progressive over time, an in end stage arthritis surgical interventions can be considered, including arthroplasty. All questions were answered and the patient voiced their understanding.  Will continue treating his knee nonsurgically for now he is doing well with that.  Will send him down for an ultrasound to rule out DVT in that left lower extremity.  I will see him back as needed    BMI Readings from Last 1 Encounters:   01/03/25 22.89 kg/m²      Lab Results   Component Value Date    CREATININE 0.85 05/09/2024     Tobacco Use: Low Risk  (3/5/2025)    Patient History     Smoking Tobacco Use: Never     Smokeless Tobacco Use: Never     Passive Exposure: Never      Computed MELD 3.0 unavailable. One or more values for this score either were not found within the given timeframe or did not fit some other criterion.  Computed MELD-Na unavailable. One or more values for this score either were not found within the given timeframe or did not fit some other criterion.       Lab Results   Component Value Date    HGBA1C 5.3 09/18/2019     Lab Results   Component Value Date    STAPHMRSASCR NO Staphylococcus aureus ISOLATED. 09/15/2023

## 2025-03-06 ENCOUNTER — TELEPHONE (OUTPATIENT)
Dept: ORTHOPEDIC SURGERY | Facility: CLINIC | Age: 75
End: 2025-03-06
Payer: MEDICARE

## 2025-03-06 DIAGNOSIS — M79.605 PAIN AND SWELLING OF LEFT LOWER EXTREMITY: Primary | ICD-10-CM

## 2025-03-06 DIAGNOSIS — M79.89 PAIN AND SWELLING OF LEFT LOWER EXTREMITY: Primary | ICD-10-CM

## 2025-03-06 NOTE — TELEPHONE ENCOUNTER
Patient is aware no DVT. He wants to know what he can do to help with the swelling in the mean time?

## 2025-03-07 ENCOUNTER — APPOINTMENT (OUTPATIENT)
Dept: ORTHOPEDIC SURGERY | Facility: CLINIC | Age: 75
End: 2025-03-07
Payer: MEDICARE

## 2025-03-11 ENCOUNTER — APPOINTMENT (OUTPATIENT)
Dept: ORTHOPEDIC SURGERY | Facility: CLINIC | Age: 75
End: 2025-03-11
Payer: MEDICARE

## 2025-03-11 ENCOUNTER — TREATMENT (OUTPATIENT)
Dept: PHYSICAL THERAPY | Facility: CLINIC | Age: 75
End: 2025-03-11
Payer: MEDICARE

## 2025-03-11 DIAGNOSIS — M17.9 KNEE OSTEOARTHRITIS: Primary | ICD-10-CM

## 2025-03-11 DIAGNOSIS — M62.81 MUSCLE WEAKNESS-GENERAL: ICD-10-CM

## 2025-03-11 DIAGNOSIS — G20.A1 PARKINSON'S DISEASE, UNSPECIFIED WHETHER DYSKINESIA PRESENT, UNSPECIFIED WHETHER MANIFESTATIONS FLUCTUATE: ICD-10-CM

## 2025-03-11 DIAGNOSIS — M48.07 LUMBOSACRAL SPINAL STENOSIS: ICD-10-CM

## 2025-03-11 DIAGNOSIS — M21.372 LEFT FOOT DROP: ICD-10-CM

## 2025-03-11 PROCEDURE — 97110 THERAPEUTIC EXERCISES: CPT | Mod: GP | Performed by: PHYSICAL THERAPIST

## 2025-03-11 ASSESSMENT — PAIN SCALES - GENERAL: PAINLEVEL_OUTOF10: 3

## 2025-03-11 ASSESSMENT — PAIN - FUNCTIONAL ASSESSMENT: PAIN_FUNCTIONAL_ASSESSMENT: 0-10

## 2025-03-11 NOTE — PROGRESS NOTES
"Physical Therapy Treatment    Patient Name: Edilson Cantu  MRN: [id number]  Today's Date: 3/11/2025    Current Problem  Problem List Items Addressed This Visit             ICD-10-CM    Knee osteoarthritis - Primary M17.9    Left foot drop M21.372    Lumbosacral spinal stenosis M48.07    Muscle weakness-general M62.81    Parkinson's disease (Multi) G20.A1       Insurance:  Payor: [facility] MEDICARE / Plan: [employer] / Product Type: *No Product type* /   Number of Treatments Authorized: 22/30  Certification Period Start Date: 02/11/25  Certification Period End Date: 05/12/25    Subjective   General  Reason for Referral: DIFFICULTY WALKING  Referred By: Dr Landeros  Past Medical History Relevant to Rehab: PD dx in 2020. Taking CD/LD every 3 hours.  General Comment: States US was clear - no DVT . Md uncertain of cause.  Had injection in L knee last week. Knee feeling a little better and his mobility has also improved. Falling frequency reducing - \"I actually have no fall days.\"    Performing HEP?: Yes    Precautions  Precautions  Precautions Comment: high fall risk - PD, severe back stenosis, OA of L knee, R knee replacement  Pain  Pain Assessment: 0-10  0-10 (Numeric) Pain Score: 3  Pain Type: Chronic pain  Pain Location: Back    Objective     Treatments:    Therapeutic Exercise  Therapeutic Exercise Activity 1: SciFit: level 1 - 8 min  Therapeutic Exercise Activity 2: Lars shoulder press: 5 hold x 10  Therapeutic Exercise Activity 3: Supine clamshells: 2 x 10 -  GTB  Therapeutic Exercise Activity 4: Supine hip flexion: 1.5#'s x 1 min - B  Therapeutic Exercise Activity 5: Bridges with GTB x 1 min  Therapeutic Exercise Activity 6: Leg press into R-ball: 10 x - B  Therapeutic Exercise Activity 7: Seated spinal extension against ball: 5 hold x 10  Therapeutic Exercise Activity 8: TG: level 5 - 2 x 1 min       Therapeutic Activity  Therapeutic Activity 2: Gait in department using rollator: 110 ft x 2 - SBA    OP " EDUCATION:  Outpatient Education  Education Comment: Edu re: increased fall risk with dual tasking.  Indicates understanding.    Assessment:  PT Assessment  Assessment Comment: Patient with reduced falling frequency recently.  Feels greater stability following L knee cortisone injection. Has cut on top of head from recent fall off a chair when he was bending forward attempting to relieve LBP.  No injury requiring Md visit per client. Remains high fall risk.    Plan:  OP PT Plan  PT Frequency: 1 time per week  Duration: 3 months  Certification Period Start Date: 25  Certification Period End Date: 25  Number of Treatments Authorized:   Rehab Potential: Fair  Plan of Care Agreement: Patient    Focus on stepping activities for stability.     Goals:  Active       PT Problem       PT Goal 1       Start:  24    Expected End:  25         LT) Progress HEP and OMID within pt tolerance to goal attainment, including return to ADL/IADL and functional activities w/ less fall potential/improved safety as measured by 5 point improvement in MiniBEST test:     (on 25) vs   (on 24) vs   8 (on 24)  2)  Pt. will have improved static balance ability as evidenced by performing static standing for 2 minutes on foam surface and to demonstrate improved dynamic balance by performing LSVT modified standing forward step, side step and backwards step independently without falls. NOT ACHIEVED.   3)  Pt. will have safe, independent ambulation with safest/least restrictive assistive device for community distances and stairs/curbs with normalized gait pattern.  PROGRESSING TOWARDS. DEMONSTRATES IMPROVED FOOT/ANKLE CONTROL W/AFO. PERFORMANCE VARIES WITH KNEE AND BACK PAIN.   4) Pt. will have at least 8 point improvement in functional test score- MiniBEST test to > 15/28 to demonstrate reduced fall risk. PROGRESSING TOWARD. SEE ABOVE FOR SCORES. CURRENTLY .   5)  Pt will report no  falling for 1 week to demonstrate significant progress in safe home and community access, understanding of movement principles specific to PD related problems of multi-tasking in addition to his many orthopedic complications by discharge. NOT ACHIEVED.          Patient Stated Goal 1       Start:  02/05/24    Expected End:  02/13/25       1) Safely perform all daily activities without falling for 3-4 weeks by using appropriate device and movement strategies by discharge.    PERFORMANCE VARIES.  Had been falling 2-3 x a week, now he has returned to falling daily. Recommend add'l therapy to address retropulsion.               Time Calculation  Start Time: 1215  Stop Time: 1300  Time Calculation (min): 45 min  PT Therapeutic Procedures Time Entry  Therapeutic Exercise Time Entry: 35  Therapeutic Activity Time Entry: 5,

## 2025-03-25 ENCOUNTER — TREATMENT (OUTPATIENT)
Dept: PHYSICAL THERAPY | Facility: CLINIC | Age: 75
End: 2025-03-25
Payer: MEDICARE

## 2025-03-25 DIAGNOSIS — M62.81 MUSCLE WEAKNESS-GENERAL: ICD-10-CM

## 2025-03-25 DIAGNOSIS — G20.A1 PARKINSON'S DISEASE, UNSPECIFIED WHETHER DYSKINESIA PRESENT, UNSPECIFIED WHETHER MANIFESTATIONS FLUCTUATE: ICD-10-CM

## 2025-03-25 DIAGNOSIS — M48.07 LUMBOSACRAL SPINAL STENOSIS: ICD-10-CM

## 2025-03-25 DIAGNOSIS — M17.9 KNEE OSTEOARTHRITIS: Primary | ICD-10-CM

## 2025-03-25 DIAGNOSIS — M21.372 LEFT FOOT DROP: ICD-10-CM

## 2025-03-25 PROCEDURE — 97110 THERAPEUTIC EXERCISES: CPT | Mod: GP | Performed by: PHYSICAL THERAPIST

## 2025-03-25 PROCEDURE — 97530 THERAPEUTIC ACTIVITIES: CPT | Mod: GP | Performed by: PHYSICAL THERAPIST

## 2025-03-25 ASSESSMENT — PAIN SCALES - GENERAL: PAINLEVEL_OUTOF10: 3

## 2025-03-25 ASSESSMENT — PAIN - FUNCTIONAL ASSESSMENT: PAIN_FUNCTIONAL_ASSESSMENT: 0-10

## 2025-03-25 NOTE — PROGRESS NOTES
Physical Therapy Treatment    Patient Name: Edilson Cantu  MRN: [id number]  Today's Date: 3/25/2025    Current Problem  Problem List Items Addressed This Visit             ICD-10-CM    Knee osteoarthritis - Primary M17.9    Left foot drop M21.372    Lumbosacral spinal stenosis M48.07    Muscle weakness-general M62.81    Parkinson's disease (Multi) G20.A1       Insurance:  Payor: [employer] / Plan: [employer]IC[employer] / Product Type: *No Product type* /   Number of Treatments Authorized: 23/30  Certification Period Start Date: 02/11/25  Certification Period End Date: 05/12/25    Subjective   General  Reason for Referral: DIFFICULTY WALKING  Referred By: Dr Landeros  Past Medical History Relevant to Rehab: PD dx in 2020. Taking CD/LD every 3 hours.  General Comment: States that he has been falling a lot recently. Didn't fall very much on his trip - 2 x. to see another neurologist on April 24th re: medication and treatment.  Knee still doing well from injection.    Performing HEP?: Yes  Current program:  Bridges with 10# wt at hips  SLR  SL clams with 4-5#'s at knees  Supine ball press   Isolated back lift  Sit to stand     Precautions  Precautions  Precautions Comment: high fall risk - PD, severe back stenosis, OA of L knee, R knee replacement  Pain  Pain Assessment: 0-10  0-10 (Numeric) Pain Score: 3  Pain Type: Chronic pain  Pain Location: Back    Objective   Treatments:    Therapeutic Exercise  Therapeutic Exercise Activity 5: Bridges with 7.5# at hips x 10  Therapeutic Exercise Activity 6: SLR: x 10 - B  Therapeutic Exercise Activity 7: Isolated back lift - 5 hold x 10  Therapeutic Exercise Activity 8: Supine MIP: 2# x 10  Therapeutic Exercise Activity 9: SL clams: 4# 2 x 10 - B  Therapeutic Exercise Activity 10: Supine leg press into ball: x 10 - 5 hold    Therapeutic Activity  Therapeutic Activity 2: K-tape ( ) at L knee to support patella  Therapeutic Activity 3: Gait with 4 WW before and after  tape  Therapeutic Activity 4: HEP changes made and reviewed with patient.    OP EDUCATION:  Outpatient Education  Education Comment: Education re: multiple reasons for falls - PD and retropulsion bias, Signifidant LE weakness and paresis from severe stenosis, Severe OA of L knee and shoulders bilaterally as well as R knee TKA ~ 1 year ago.  Edu re: increasing exercise intensity of HEP as able - but in a safe manner.  Added resistance to bridges, clamshells.  Indicates understanding.    Assessment:  PT Assessment  Assessment Comment: Patient reporting increased L knee stability following K-tape. Indicates understanding of tape care and how to remove if necessary. Patient with multiple reasons for increased falls.  PT attempting to address movement dysfunction from PD as well as significant core and LE weakness from stenosis and OA.  Patient reporting increased falls, but not while away on vacation - likely due to increased support and having less attempts at reaching and lifting vs at home.  Adjustments to HEP made to address strength.    Plan:  OP PT Plan  PT Frequency: 1 time per week  Duration: 3 months  Certification Period Start Date: 25  Certification Period End Date: 25  Number of Treatments Authorized:   Rehab Potential: Fair  Plan of Care Agreement: Patient    Monitor HEP changes and compliance.   Add sitting posture and rows to HEP.     Goals:  Active       PT Problem       PT Goal 1       Start:  24    Expected End:  25         LT) Progress HEP and OMID within pt tolerance to goal attainment, including return to ADL/IADL and functional activities w/ less fall potential/improved safety as measured by 5 point improvement in MiniBEST test:     (on 25) vs   (on 24) vs    (on 24)  2)  Pt. will have improved static balance ability as evidenced by performing static standing for 2 minutes on foam surface and to demonstrate improved dynamic balance by  performing LSVT modified standing forward step, side step and backwards step independently without falls. NOT ACHIEVED.   3)  Pt. will have safe, independent ambulation with safest/least restrictive assistive device for community distances and stairs/curbs with normalized gait pattern.  PROGRESSING TOWARDS. DEMONSTRATES IMPROVED FOOT/ANKLE CONTROL W/AFO. PERFORMANCE VARIES WITH KNEE AND BACK PAIN.   4) Pt. will have at least 8 point improvement in functional test score- MiniBEST test to > 15/28 to demonstrate reduced fall risk. PROGRESSING TOWARD. SEE ABOVE FOR SCORES. CURRENTLY 8/28.   5)  Pt will report no falling for 1 week to demonstrate significant progress in safe home and community access, understanding of movement principles specific to PD related problems of multi-tasking in addition to his many orthopedic complications by discharge. NOT ACHIEVED.          Patient Stated Goal 1       Start:  02/05/24    Expected End:  02/13/25       1) Safely perform all daily activities without falling for 3-4 weeks by using appropriate device and movement strategies by discharge.    PERFORMANCE VARIES.  Had been falling 2-3 x a week, now he has returned to falling daily. Recommend add'l therapy to address retropulsion.               Time Calculation  Start Time: 1215  Stop Time: 1300  Time Calculation (min): 45 min  PT Therapeutic Procedures Time Entry  Therapeutic Exercise Time Entry: 20  Therapeutic Activity Time Entry: 23,

## 2025-04-01 ENCOUNTER — TREATMENT (OUTPATIENT)
Dept: PHYSICAL THERAPY | Facility: CLINIC | Age: 75
End: 2025-04-01
Payer: MEDICARE

## 2025-04-01 DIAGNOSIS — G20.A1 PARKINSON'S DISEASE WITHOUT DYSKINESIA, WITHOUT MENTION OF FLUCTUATIONS (MULTI): ICD-10-CM

## 2025-04-01 DIAGNOSIS — M21.372 FOOT DROP, LEFT FOOT: ICD-10-CM

## 2025-04-01 DIAGNOSIS — M48.07 LUMBOSACRAL SPINAL STENOSIS: ICD-10-CM

## 2025-04-01 DIAGNOSIS — M21.372 LEFT FOOT DROP: ICD-10-CM

## 2025-04-01 DIAGNOSIS — M62.81 MUSCLE WEAKNESS (GENERALIZED): ICD-10-CM

## 2025-04-01 DIAGNOSIS — M17.9 KNEE OSTEOARTHRITIS: Primary | ICD-10-CM

## 2025-04-01 DIAGNOSIS — M48.07 SPINAL STENOSIS, LUMBOSACRAL REGION: ICD-10-CM

## 2025-04-01 DIAGNOSIS — G20.A1 PARKINSON'S DISEASE, UNSPECIFIED WHETHER DYSKINESIA PRESENT, UNSPECIFIED WHETHER MANIFESTATIONS FLUCTUATE: ICD-10-CM

## 2025-04-01 DIAGNOSIS — M62.81 MUSCLE WEAKNESS-GENERAL: ICD-10-CM

## 2025-04-01 DIAGNOSIS — M17.9 OSTEOARTHRITIS OF KNEE, UNSPECIFIED: ICD-10-CM

## 2025-04-01 PROCEDURE — 97110 THERAPEUTIC EXERCISES: CPT | Mod: GP | Performed by: PHYSICAL THERAPIST

## 2025-04-01 ASSESSMENT — PAIN SCALES - GENERAL: PAINLEVEL_OUTOF10: 4

## 2025-04-01 ASSESSMENT — PAIN - FUNCTIONAL ASSESSMENT: PAIN_FUNCTIONAL_ASSESSMENT: 0-10

## 2025-04-01 NOTE — PROGRESS NOTES
Physical Therapy Treatment    Patient Name: Edilson Cantu  MRN: [id number]  Today's Date: 4/1/2025    Current Problem  Problem List Items Addressed This Visit             ICD-10-CM    Knee osteoarthritis - Primary M17.9    Left foot drop M21.372    Lumbosacral spinal stenosis M48.07    Muscle weakness-general M62.81    Parkinson's disease G20.A1     Other Visit Diagnoses         Codes    Osteoarthritis of knee, unspecified     M17.9    Foot drop, left foot     M21.372    Spinal stenosis, lumbosacral region     M48.07    Muscle weakness (generalized)     M62.81    Parkinson's disease without dyskinesia, without mention of fluctuations (Multi)     G20.A1            Insurance:  Payor: [employer] / Plan: [employer] MEDICARE / Product Type: *No Product type* /   Number of Treatments Authorized: 24/30  Certification Period Start Date: 02/11/25  Certification Period End Date: 05/12/25    Subjective   General  Reason for Referral: DIFFICULTY WALKING  Referred By: Dr Landeros  Past Medical History Relevant to Rehab: PD dx in 2020. Taking CD/LD every 3 hours.  General Comment: Client and spouse raise concerns about shoulder ROM - difficulty dressing. Patient reports his knee feels more stable with tape.  Has fallen only 1 time since last session.    Performing HEP?: Yes  Performing with resistance at home.   Precautions  Precautions  Precautions Comment: high fall risk - PD, severe back stenosis, OA of L knee, R knee replacement  Pain  Pain Assessment: 0-10  0-10 (Numeric) Pain Score: 4  Pain Location:  (Back and knee)    Objective   Treatments:    Therapeutic Exercise  Therapeutic Exercise Activity 1: Lars shoulder press: 10 hold x 5  Therapeutic Exercise Activity 2: Shoulder pulleys: 2 minutes  Therapeutic Exercise Activity 3: Seated AAROM abd: wand x 10 - B  Therapeutic Exercise Activity 4: UBE: 2 min FWD/ 2 min BWD  Therapeutic Exercise Activity 5: Bridges: 10# at hips - 2 x 10  Therapeutic Exercise Activity 6: Seated  rows: RTB x 5 (increased LBP)  Therapeutic Exercise Activity 7: Isolated back lift 1.5# - 5 hold x 10  Therapeutic Exercise Activity 8: Supine MIP: 2#- 2  x 10  Therapeutic Exercise Activity 9: Supine scapular adduction w/shoulders abd to 50 deg  Therapeutic Exercise Activity 10: Supine leg press into ball: x 10 - 5 hold  Therapeutic Exercise Activity 11: Slouch correct x 10      OP EDUCATION:  Outpatient Education  Education Comment: No changes.    Assessment:  PT Assessment  Assessment Comment: Patient's knee buckled x 2 while walking out of therapy requiring assist to avoid fall. Instructed to rest in lobby before walking to car with his spouse. Patient to look into knee sleeve with medial and lateral stabilizers for L knee to see if this works as well as taping knee. Patient also to look into using shoulder pulleys as he was able to perform without increased shoulder pain and seemed to help his mobility.  Extended set up times due to bradykinesia.  Plan:  OP PT Plan  PT Frequency: 1 time per week  Duration: 3 months  Certification Period Start Date: 25  Certification Period End Date: 25  Number of Treatments Authorized:   Rehab Potential: Fair  Plan of Care Agreement: Patient    Goals:  Active       PT Problem       PT Goal 1       Start:  24    Expected End:  25         LT) Progress HEP and OMID within pt tolerance to goal attainment, including return to ADL/IADL and functional activities w/ less fall potential/improved safety as measured by 5 point improvement in MiniBEST test:     (on 25) vs   (on 24) vs    (on 24)  2)  Pt. will have improved static balance ability as evidenced by performing static standing for 2 minutes on foam surface and to demonstrate improved dynamic balance by performing LSVT modified standing forward step, side step and backwards step independently without falls. NOT ACHIEVED.   3)  Pt. will have safe, independent  ambulation with safest/least restrictive assistive device for community distances and stairs/curbs with normalized gait pattern.  PROGRESSING TOWARDS. DEMONSTRATES IMPROVED FOOT/ANKLE CONTROL W/AFO. PERFORMANCE VARIES WITH KNEE AND BACK PAIN.   4) Pt. will have at least 8 point improvement in functional test score- MiniBEST test to > 15/28 to demonstrate reduced fall risk. PROGRESSING TOWARD. SEE ABOVE FOR SCORES. CURRENTLY 8/28.   5)  Pt will report no falling for 1 week to demonstrate significant progress in safe home and community access, understanding of movement principles specific to PD related problems of multi-tasking in addition to his many orthopedic complications by discharge. NOT ACHIEVED.          Patient Stated Goal 1       Start:  02/05/24    Expected End:  02/13/25       1) Safely perform all daily activities without falling for 3-4 weeks by using appropriate device and movement strategies by discharge.    PERFORMANCE VARIES.  Had been falling 2-3 x a week, now he has returned to falling daily. Recommend add'l therapy to address retropulsion.               Time Calculation  Start Time: 1400  Stop Time: 1445  Time Calculation (min): 45 min  PT Therapeutic Procedures Time Entry  Therapeutic Exercise Time Entry: 40,

## 2025-04-08 ENCOUNTER — TREATMENT (OUTPATIENT)
Dept: PHYSICAL THERAPY | Facility: CLINIC | Age: 75
End: 2025-04-08
Payer: MEDICARE

## 2025-04-08 DIAGNOSIS — M48.07 SPINAL STENOSIS, LUMBOSACRAL REGION: ICD-10-CM

## 2025-04-08 DIAGNOSIS — M17.9 KNEE OSTEOARTHRITIS: Primary | ICD-10-CM

## 2025-04-08 DIAGNOSIS — M17.9 OSTEOARTHRITIS OF KNEE, UNSPECIFIED: ICD-10-CM

## 2025-04-08 DIAGNOSIS — G20.A1 PARKINSON'S DISEASE WITHOUT DYSKINESIA, WITHOUT MENTION OF FLUCTUATIONS (MULTI): ICD-10-CM

## 2025-04-08 DIAGNOSIS — M48.07 LUMBOSACRAL SPINAL STENOSIS: ICD-10-CM

## 2025-04-08 DIAGNOSIS — G20.A1 PARKINSON'S DISEASE, UNSPECIFIED WHETHER DYSKINESIA PRESENT, UNSPECIFIED WHETHER MANIFESTATIONS FLUCTUATE: ICD-10-CM

## 2025-04-08 DIAGNOSIS — M62.81 MUSCLE WEAKNESS-GENERAL: ICD-10-CM

## 2025-04-08 DIAGNOSIS — M62.81 MUSCLE WEAKNESS (GENERALIZED): ICD-10-CM

## 2025-04-08 DIAGNOSIS — M21.372 FOOT DROP, LEFT FOOT: ICD-10-CM

## 2025-04-08 DIAGNOSIS — M21.372 LEFT FOOT DROP: ICD-10-CM

## 2025-04-08 PROCEDURE — 97110 THERAPEUTIC EXERCISES: CPT | Mod: GP | Performed by: PHYSICAL THERAPIST

## 2025-04-08 PROCEDURE — 97530 THERAPEUTIC ACTIVITIES: CPT | Mod: GP | Performed by: PHYSICAL THERAPIST

## 2025-04-08 ASSESSMENT — PAIN SCALES - GENERAL: PAINLEVEL_OUTOF10: 3

## 2025-04-08 ASSESSMENT — PAIN - FUNCTIONAL ASSESSMENT: PAIN_FUNCTIONAL_ASSESSMENT: 0-10

## 2025-04-08 NOTE — PROGRESS NOTES
Physical Therapy Treatment    Patient Name: Edilson Cantu  MRN: [id number]  Today's Date: 4/8/2025    Current Problem  Problem List Items Addressed This Visit             ICD-10-CM    Knee osteoarthritis - Primary M17.9    Left foot drop M21.372    Lumbosacral spinal stenosis M48.07    Muscle weakness-general M62.81    Parkinson's disease G20.A1     Other Visit Diagnoses         Codes    Osteoarthritis of knee, unspecified     M17.9    Foot drop, left foot     M21.372    Spinal stenosis, lumbosacral region     M48.07    Muscle weakness (generalized)     M62.81    Parkinson's disease without dyskinesia, without mention of fluctuations (Multi)     G20.A1            Insurance:  Payor: [employer] MEDICARE / Plan: [employer] / Product Type: *No Product type* /   Number of Treatments Authorized: 25/30  Certification Period Start Date: 02/11/25  Certification Period End Date: 05/12/25    Subjective   General  Reason for Referral: DIFFICULTY WALKING  Referred By: Dr Landeros  Past Medical History Relevant to Rehab: PD dx in 2020. Taking CD/LD every 3 hours.  General Comment: Reporting 3 falls since last session.  Has not found knee brace for support.  Spouse asking about falls that occur when he attempts to enter walk in closet.    Performing HEP?: Yes    Precautions  Precautions  Precautions Comment: high fall risk - PD, severe back stenosis, OA of L knee, R knee replacement  Pain  Pain Assessment: 0-10  0-10 (Numeric) Pain Score: 3  Pain Type: Chronic pain  Pain Location: Back    Objective   Treatments:    Therapeutic Exercise  Therapeutic Exercise Activity 1: Lars shoulder press: 10 hold x 5  Therapeutic Exercise Activity 2: Shoulder pulleys: 2 minutes  Therapeutic Exercise Activity 3: Lars head press: 5 hold x 10  Therapeutic Exercise Activity 4: Seated pelvic tilt x 10  Therapeutic Exercise Activity 5: Bridges: 10# at hips - 2 x 10  Therapeutic Exercise Activity 7: Isolated back lift 1.5# - 5 hold x  "10  Therapeutic Exercise Activity 8: Supine MIP: 2#- 2  x 10  Therapeutic Exercise Activity 9: Total Gym squats :Level 4 -2 x 20  Therapeutic Exercise Activity 10: Total gym hip clam with GTB 2 x 10  Therapeutic Exercise Activity 11: Sit to stand: AirEx pad 2 x 5        Therapeutic Activity  Therapeutic Activity 2: K-tape of L knee ( ) pattern for patellar support  Therapeutic Activity 3: Gait with 4 WW    OP EDUCATION:  Outpatient Education  Education Comment: Edu re: entering the closet with a single task as getting in requires multi-tasking of enteriing a small space while thinking of clothing to pick out .  Patient to practice singular focus in small space.    Assessment:  PT Assessment  Assessment Comment: Patient tolerated session well. No knee buckling after session. \"Hip pronation\" addressed today on TG without increased knee pain - but increased L knee creptius on 2nd set. Will benefit from continued strengthening.  Continue to progress core/hip strengthening as able for safe transfers and  ambulation in his home and community.  Slow movement throughout session due to bradykinesia from PD.   Plan:  OP PT Plan  PT Frequency: 1 time per week  Duration: 3 months  Certification Period Start Date: 25  Certification Period End Date: 25  Number of Treatments Authorized:   Rehab Potential: Fair  Plan of Care Agreement: Patient    Goals:  Active       PT Problem       PT Goal 1       Start:  24    Expected End:  25         LT) Progress HEP and OMID within pt tolerance to goal attainment, including return to ADL/IADL and functional activities w/ less fall potential/improved safety as measured by 5 point improvement in MiniBEST test:     (on 25) vs   (on 24) vs    (on 24)  2)  Pt. will have improved static balance ability as evidenced by performing static standing for 2 minutes on foam surface and to demonstrate improved dynamic balance by performing LSVT " modified standing forward step, side step and backwards step independently without falls. NOT ACHIEVED.   3)  Pt. will have safe, independent ambulation with safest/least restrictive assistive device for community distances and stairs/curbs with normalized gait pattern.  PROGRESSING TOWARDS. DEMONSTRATES IMPROVED FOOT/ANKLE CONTROL W/AFO. PERFORMANCE VARIES WITH KNEE AND BACK PAIN.   4) Pt. will have at least 8 point improvement in functional test score- MiniBEST test to > 15/28 to demonstrate reduced fall risk. PROGRESSING TOWARD. SEE ABOVE FOR SCORES. CURRENTLY 8/28.   5)  Pt will report no falling for 1 week to demonstrate significant progress in safe home and community access, understanding of movement principles specific to PD related problems of multi-tasking in addition to his many orthopedic complications by discharge. NOT ACHIEVED.          Patient Stated Goal 1       Start:  02/05/24    Expected End:  02/13/25       1) Safely perform all daily activities without falling for 3-4 weeks by using appropriate device and movement strategies by discharge.    PERFORMANCE VARIES.  Had been falling 2-3 x a week, now he has returned to falling daily. Recommend add'l therapy to address retropulsion.               Time Calculation  Start Time: 1400  Stop Time: 1445  Time Calculation (min): 45 min  PT Therapeutic Procedures Time Entry  Therapeutic Exercise Time Entry: 33  Therapeutic Activity Time Entry: 8,

## 2025-04-15 ENCOUNTER — TREATMENT (OUTPATIENT)
Dept: PHYSICAL THERAPY | Facility: CLINIC | Age: 75
End: 2025-04-15
Payer: MEDICARE

## 2025-04-15 DIAGNOSIS — G20.A1 PARKINSON'S DISEASE WITHOUT DYSKINESIA, WITHOUT MENTION OF FLUCTUATIONS (MULTI): ICD-10-CM

## 2025-04-15 DIAGNOSIS — M62.81 MUSCLE WEAKNESS (GENERALIZED): ICD-10-CM

## 2025-04-15 DIAGNOSIS — G20.A1 PARKINSON'S DISEASE, UNSPECIFIED WHETHER DYSKINESIA PRESENT, UNSPECIFIED WHETHER MANIFESTATIONS FLUCTUATE: ICD-10-CM

## 2025-04-15 DIAGNOSIS — M62.81 MUSCLE WEAKNESS-GENERAL: ICD-10-CM

## 2025-04-15 DIAGNOSIS — M48.07 SPINAL STENOSIS, LUMBOSACRAL REGION: ICD-10-CM

## 2025-04-15 DIAGNOSIS — M21.372 LEFT FOOT DROP: ICD-10-CM

## 2025-04-15 DIAGNOSIS — M48.07 LUMBOSACRAL SPINAL STENOSIS: ICD-10-CM

## 2025-04-15 DIAGNOSIS — M17.9 KNEE OSTEOARTHRITIS: Primary | ICD-10-CM

## 2025-04-15 DIAGNOSIS — M21.372 FOOT DROP, LEFT FOOT: ICD-10-CM

## 2025-04-15 DIAGNOSIS — M17.9 OSTEOARTHRITIS OF KNEE, UNSPECIFIED: ICD-10-CM

## 2025-04-15 PROCEDURE — 97110 THERAPEUTIC EXERCISES: CPT | Mod: GP | Performed by: PHYSICAL THERAPIST

## 2025-04-15 ASSESSMENT — PAIN - FUNCTIONAL ASSESSMENT: PAIN_FUNCTIONAL_ASSESSMENT: 0-10

## 2025-04-15 ASSESSMENT — PAIN SCALES - GENERAL: PAINLEVEL_OUTOF10: 3

## 2025-04-15 NOTE — PROGRESS NOTES
Physical Therapy Treatment    Patient Name: Edilson Cantu  MRN: [id number]  Today's Date: 4/15/2025    Current Problem  Problem List Items Addressed This Visit             ICD-10-CM    Knee osteoarthritis - Primary M17.9    Left foot drop M21.372    Lumbosacral spinal stenosis M48.07    Muscle weakness-general M62.81    Parkinson's disease G20.A1     Other Visit Diagnoses         Codes    Osteoarthritis of knee, unspecified     M17.9    Foot drop, left foot     M21.372    Spinal stenosis, lumbosacral region     M48.07    Muscle weakness (generalized)     M62.81    Parkinson's disease without dyskinesia, without mention of fluctuations (Multi)     G20.A1            Insurance:  Payor: [employer] MEDICARE / Plan: [employer] / Product Type: *No Product type* /   Number of Treatments Authorized: 26/30  Certification Period Start Date: 02/11/25  Certification Period End Date: 05/12/25    Subjective   General  Reason for Referral: DIFFICULTY WALKING  Referred By: Dr Landeros  Past Medical History Relevant to Rehab: PD dx in 2020. Taking CD/LD every 3 hours.  General Comment: States that the tape seems to help re: both knee pain and stability. Has shoulder pulleys - not yet using at home. Continues to have difficulty reaching for dressing. Reports only 1 fall in the past week.    Performing HEP?: Yes    Precautions  Precautions  Precautions Comment: high fall risk - PD, severe back stenosis, OA of L knee, R knee replacement  Pain  Pain Assessment: 0-10  0-10 (Numeric) Pain Score: 3  Pain Type: Chronic pain  Pain Location: Back    Objective   Treatments:    Therapeutic Exercise  Therapeutic Exercise Activity 1: SciFit: level 2.5 - 6 min  Therapeutic Exercise Activity 2: Shoulder pulleys: 2 minutes  Therapeutic Exercise Activity 3: Lars head press: 3 x 10  Therapeutic Exercise Activity 5: Bridges: 10# at hips - 3 x 10  Therapeutic Exercise Activity 6: Seated rows: GTB x 5 (increased LBP)  Therapeutic Exercise Activity 7:  "Isolated back lift 2# - 5 hold- 2 x 10  Therapeutic Exercise Activity 8: Supine MIP: 2#- 2  x 10  Therapeutic Exercise Activity 9: Total Gym squats :Level 4 -2 x 20  Therapeutic Exercise Activity 10: Total gym hip clam with GTB 2 x 10  Therapeutic Exercise Activity 11: Sit to stand: AirEx pad 2 x 5  Therapeutic Exercise Activity 12: Supine lateral step: x 10  - over 1/2 roll    Therapeutic Activity  Therapeutic Activity 3: Gait with 4WW - 110 ft x 2      OP EDUCATION:  Outpatient Education  Education Comment: Continued edu re: entering closet with focus on singular task of movement patterns needed for task vs using up \"head space\" for picking out items. Indicates understanding.    Assessment:  PT Assessment  Assessment Comment: Patient tolerated session well. Able to increase intensity due to lower pain levels.  Challenged with TG exercises and with new supine lateral step overs.  Demonstrating more stable gait with rollator today. Reports only 1 fall this week despite not being able to use AFO due to foot/ankle swelling.    Plan:  OP PT Plan  PT Frequency: 1 time per week  Duration: 3 months  Certification Period Start Date: 25  Certification Period End Date: 25  Number of Treatments Authorized:   Rehab Potential: Fair  Plan of Care Agreement: Patient    Goals:  Active       PT Problem       PT Goal 1       Start:  24    Expected End:  25         LT) Progress HEP and OMID within pt tolerance to goal attainment, including return to ADL/IADL and functional activities w/ less fall potential/improved safety as measured by 5 point improvement in MiniBEST test:     (on 25) vs   (on 24) vs    (on 24)  2)  Pt. will have improved static balance ability as evidenced by performing static standing for 2 minutes on foam surface and to demonstrate improved dynamic balance by performing LSVT modified standing forward step, side step and backwards step independently " without falls. NOT ACHIEVED.   3)  Pt. will have safe, independent ambulation with safest/least restrictive assistive device for community distances and stairs/curbs with normalized gait pattern.  PROGRESSING TOWARDS. DEMONSTRATES IMPROVED FOOT/ANKLE CONTROL W/AFO. PERFORMANCE VARIES WITH KNEE AND BACK PAIN.   4) Pt. will have at least 8 point improvement in functional test score- MiniBEST test to > 15/28 to demonstrate reduced fall risk. PROGRESSING TOWARD. SEE ABOVE FOR SCORES. CURRENTLY 8/28.   5)  Pt will report no falling for 1 week to demonstrate significant progress in safe home and community access, understanding of movement principles specific to PD related problems of multi-tasking in addition to his many orthopedic complications by discharge. NOT ACHIEVED.          Patient Stated Goal 1       Start:  02/05/24    Expected End:  02/13/25       1) Safely perform all daily activities without falling for 3-4 weeks by using appropriate device and movement strategies by discharge.    PERFORMANCE VARIES.  Had been falling 2-3 x a week, now he has returned to falling daily. Recommend add'l therapy to address retropulsion.               Time Calculation  Start Time: 1400  Stop Time: 1500  Time Calculation (min): 60 min  PT Therapeutic Procedures Time Entry  Therapeutic Exercise Time Entry: 53  Gait Training Time Entry: 3,

## 2025-04-21 ENCOUNTER — HOSPITAL ENCOUNTER (OUTPATIENT)
Dept: RADIOLOGY | Facility: HOSPITAL | Age: 75
Discharge: HOME | End: 2025-04-21
Payer: MEDICARE

## 2025-04-21 DIAGNOSIS — M48.00 SPINAL STENOSIS, SITE UNSPECIFIED: ICD-10-CM

## 2025-04-21 DIAGNOSIS — M50.20 OTHER CERVICAL DISC DISPLACEMENT, UNSPECIFIED CERVICAL REGION: ICD-10-CM

## 2025-04-21 PROCEDURE — 72141 MRI NECK SPINE W/O DYE: CPT

## 2025-04-21 PROCEDURE — 72141 MRI NECK SPINE W/O DYE: CPT | Performed by: RADIOLOGY

## 2025-04-22 ENCOUNTER — TREATMENT (OUTPATIENT)
Dept: PHYSICAL THERAPY | Facility: CLINIC | Age: 75
End: 2025-04-22
Payer: MEDICARE

## 2025-04-22 DIAGNOSIS — M17.9 OSTEOARTHRITIS OF KNEE, UNSPECIFIED: ICD-10-CM

## 2025-04-22 DIAGNOSIS — M62.81 MUSCLE WEAKNESS-GENERAL: ICD-10-CM

## 2025-04-22 DIAGNOSIS — M62.81 MUSCLE WEAKNESS (GENERALIZED): ICD-10-CM

## 2025-04-22 DIAGNOSIS — M21.372 FOOT DROP, LEFT FOOT: ICD-10-CM

## 2025-04-22 DIAGNOSIS — G20.A1 PARKINSON'S DISEASE WITHOUT DYSKINESIA, WITHOUT MENTION OF FLUCTUATIONS (MULTI): ICD-10-CM

## 2025-04-22 DIAGNOSIS — M48.07 LUMBOSACRAL SPINAL STENOSIS: ICD-10-CM

## 2025-04-22 DIAGNOSIS — M21.372 LEFT FOOT DROP: ICD-10-CM

## 2025-04-22 DIAGNOSIS — M48.07 SPINAL STENOSIS, LUMBOSACRAL REGION: ICD-10-CM

## 2025-04-22 DIAGNOSIS — G20.A1 PARKINSON'S DISEASE, UNSPECIFIED WHETHER DYSKINESIA PRESENT, UNSPECIFIED WHETHER MANIFESTATIONS FLUCTUATE: ICD-10-CM

## 2025-04-22 DIAGNOSIS — M17.9 KNEE OSTEOARTHRITIS: Primary | ICD-10-CM

## 2025-04-22 PROCEDURE — 97110 THERAPEUTIC EXERCISES: CPT | Mod: GP | Performed by: PHYSICAL THERAPIST

## 2025-04-22 ASSESSMENT — PAIN - FUNCTIONAL ASSESSMENT: PAIN_FUNCTIONAL_ASSESSMENT: 0-10

## 2025-04-22 ASSESSMENT — PAIN SCALES - GENERAL: PAINLEVEL_OUTOF10: 5 - MODERATE PAIN

## 2025-04-22 NOTE — PROGRESS NOTES
"Physical Therapy Treatment    Patient Name: Edilson Cantu  MRN: [id number]  Today's Date: 4/22/2025    Current Problem  Problem List Items Addressed This Visit           ICD-10-CM    Knee osteoarthritis - Primary M17.9    Foot drop, left foot M21.372    Spinal stenosis, lumbosacral region M48.07    Muscle weakness (generalized) M62.81    Parkinson's disease G20.A1     Other Visit Diagnoses         Codes      Osteoarthritis of knee, unspecified     M17.9      Parkinson's disease without dyskinesia, without mention of fluctuations (Multi)     G20.A1            Insurance:  Payor: [employer] MEDICARE / Plan: [employer] / Product Type: *No Product type* /   Number of Treatments Authorized: 27/30  Certification Period Start Date: 02/11/25  Certification Period End Date: 05/12/25    Subjective   General  Reason for Referral: DIFFICULTY WALKING  Referred By: Dr Landeros  Past Medical History Relevant to Rehab: PD dx in 2020. Taking CD/LD every 3 hours.  General Comment: Now following up with Dr. Ford re: hands and  Dr. Caba for PD. States he is having L hip pain from fall in kitchen yesterday and Saturday. Nearly cancelled due to pain. Knee pain is managable today.    Performing HEP?: Yes    Precautions  Precautions  Precautions Comment: high fall risk - PD, severe back stenosis, OA of L knee, R knee replacement  Pain  Pain Assessment: 0-10  0-10 (Numeric) Pain Score: 5 - Moderate pain  Pain Type: Chronic pain  Pain Location: Back    Objective   Narrow MARIAMA with gait.   Treatments:    Therapeutic Exercise  Therapeutic Exercise Activity 2: Shoulder pulleys: 2 x 1 minute  Therapeutic Exercise Activity 3: Lars head press: x 1 min  Therapeutic Exercise Activity 4: Lars shoulder press x 1 min  Therapeutic Exercise Activity 5: Supine ball squeeze : 5\" hold x 1 min  Therapeutic Exercise Activity 6: Bridges: x 1 min  Therapeutic Exercise Activity 7: Supine clams: GTB x 1 min  Therapeutic Exercise Activity 8: Supine hip " "flexion: 1.5# x 20 - B  Therapeutic Exercise Activity 9: Isolated back lift: 1.5# 5 hold - 2 x 10      Therapeutic Activity  Therapeutic Activity 2: Perturbations in standing  Therapeutic Activity 3: Sit to stand 2 x 10    OP EDUCATION:  Outpatient Education  Education Comment: Edu re: plan to reassess once all testing is complete to see if Md has a plan change.    Assessment:  PT Assessment  Assessment Comment: Altered session today due to pain complaints at L hip with resent fall.  Able to weight bear without significant increase in pain. To have EMG later this week re: UE numbness and tingling in his hand.  MRI indicates \"advanced foraminal narrowing\" and \"marked cord deformity.\" Patient continues to go through bouts with low fall rate and higher rates.  Continued edu re: movement strategies to reduce fall risk. Unable to wear AFO due to LE swelling.  Extended set up times related to movement impairments associated with PD, severe weakness and fall risk.   Plan:  OP PT Plan  PT Frequency: 1 time per week  Duration: 3 months  Certification Period Start Date: 25  Certification Period End Date: 25  Number of Treatments Authorized:   Rehab Potential: Fair  Plan of Care Agreement: Patient    Check response to session and review upcoming test results.     Goals:  Active       PT Problem       PT Goal 1       Start:  24    Expected End:  25         LT) Progress HEP and OMID within pt tolerance to goal attainment, including return to ADL/IADL and functional activities w/ less fall potential/improved safety as measured by 5 point improvement in MiniBEST test:     (on 25) vs   (on 24) vs    (on 24)  2)  Pt. will have improved static balance ability as evidenced by performing static standing for 2 minutes on foam surface and to demonstrate improved dynamic balance by performing LSVT modified standing forward step, side step and backwards step independently " without falls. NOT ACHIEVED.   3)  Pt. will have safe, independent ambulation with safest/least restrictive assistive device for community distances and stairs/curbs with normalized gait pattern.  PROGRESSING TOWARDS. DEMONSTRATES IMPROVED FOOT/ANKLE CONTROL W/AFO. PERFORMANCE VARIES WITH KNEE AND BACK PAIN.   4) Pt. will have at least 8 point improvement in functional test score- MiniBEST test to > 15/28 to demonstrate reduced fall risk. PROGRESSING TOWARD. SEE ABOVE FOR SCORES. CURRENTLY 8/28.   5)  Pt will report no falling for 1 week to demonstrate significant progress in safe home and community access, understanding of movement principles specific to PD related problems of multi-tasking in addition to his many orthopedic complications by discharge. NOT ACHIEVED.          Patient Stated Goal 1       Start:  02/05/24    Expected End:  02/13/25       1) Safely perform all daily activities without falling for 3-4 weeks by using appropriate device and movement strategies by discharge.    PERFORMANCE VARIES.  Had been falling 2-3 x a week, now he has returned to falling daily. Recommend add'l therapy to address retropulsion.               Time Calculation  Start Time: 1400  Stop Time: 1445  Time Calculation (min): 45 min  PT Therapeutic Procedures Time Entry  Therapeutic Exercise Time Entry: 35  Therapeutic Activity Time Entry: 5,

## 2025-04-29 ENCOUNTER — APPOINTMENT (OUTPATIENT)
Dept: PHYSICAL THERAPY | Facility: CLINIC | Age: 75
End: 2025-04-29
Payer: MEDICARE

## 2025-04-29 DIAGNOSIS — G20.A1 PARKINSON'S DISEASE, UNSPECIFIED WHETHER DYSKINESIA PRESENT, UNSPECIFIED WHETHER MANIFESTATIONS FLUCTUATE: ICD-10-CM

## 2025-04-29 DIAGNOSIS — M17.9 KNEE OSTEOARTHRITIS: Primary | ICD-10-CM

## 2025-04-29 DIAGNOSIS — M48.07 LUMBOSACRAL SPINAL STENOSIS: ICD-10-CM

## 2025-04-29 DIAGNOSIS — M62.81 MUSCLE WEAKNESS-GENERAL: ICD-10-CM

## 2025-04-29 DIAGNOSIS — M21.372 LEFT FOOT DROP: ICD-10-CM

## 2025-05-07 ENCOUNTER — TREATMENT (OUTPATIENT)
Dept: PHYSICAL THERAPY | Facility: CLINIC | Age: 75
End: 2025-05-07
Payer: MEDICARE

## 2025-05-07 DIAGNOSIS — M21.372 FOOT DROP, LEFT FOOT: ICD-10-CM

## 2025-05-07 DIAGNOSIS — M62.81 MUSCLE WEAKNESS (GENERALIZED): ICD-10-CM

## 2025-05-07 DIAGNOSIS — M62.81 MUSCLE WEAKNESS-GENERAL: ICD-10-CM

## 2025-05-07 DIAGNOSIS — M48.07 LUMBOSACRAL SPINAL STENOSIS: ICD-10-CM

## 2025-05-07 DIAGNOSIS — M17.9 OSTEOARTHRITIS OF KNEE, UNSPECIFIED: ICD-10-CM

## 2025-05-07 DIAGNOSIS — M48.07 SPINAL STENOSIS, LUMBOSACRAL REGION: ICD-10-CM

## 2025-05-07 DIAGNOSIS — G20.A1 PARKINSON'S DISEASE, UNSPECIFIED WHETHER DYSKINESIA PRESENT, UNSPECIFIED WHETHER MANIFESTATIONS FLUCTUATE: ICD-10-CM

## 2025-05-07 DIAGNOSIS — G20.A1 PARKINSON'S DISEASE WITHOUT DYSKINESIA, WITHOUT MENTION OF FLUCTUATIONS (MULTI): ICD-10-CM

## 2025-05-07 DIAGNOSIS — M17.9 KNEE OSTEOARTHRITIS: Primary | ICD-10-CM

## 2025-05-07 DIAGNOSIS — M21.372 LEFT FOOT DROP: ICD-10-CM

## 2025-05-07 PROCEDURE — 97530 THERAPEUTIC ACTIVITIES: CPT | Mod: GP | Performed by: PHYSICAL THERAPIST

## 2025-05-07 PROCEDURE — 97110 THERAPEUTIC EXERCISES: CPT | Mod: GP | Performed by: PHYSICAL THERAPIST

## 2025-05-07 ASSESSMENT — PAIN - FUNCTIONAL ASSESSMENT: PAIN_FUNCTIONAL_ASSESSMENT: 0-10

## 2025-05-07 ASSESSMENT — PAIN SCALES - GENERAL: PAINLEVEL_OUTOF10: 2

## 2025-05-07 NOTE — PROGRESS NOTES
"Physical Therapy Treatment/Discharge    Patient Name: Edilson Cantu  MRN: [id number]  Today's Date: 2025    Current Problem  Problem List Items Addressed This Visit           ICD-10-CM    Knee osteoarthritis - Primary M17.9    Foot drop, left foot M21.372    Spinal stenosis, lumbosacral region M48.07    Muscle weakness (generalized) M62.81    Parkinson's disease G20.A1     Other Visit Diagnoses         Codes      Osteoarthritis of knee, unspecified     M17.9      Parkinson's disease without dyskinesia, without mention of fluctuations (Multi)     G20.A1            Insurance:  Payor: [employer] MEDICARE / Plan: [employer]IC[employer] / Product Type: *No Product type* /   Number of Treatments Authorized: 28          Subjective   General  Reason for Referral: DIFFICULTY WALKING  Referred By: Dr Landeros  Past Medical History Relevant to Rehab: PD dx in . Taking CD/LD every 3 hours.  General Comment: Met with neurologist. \"Severe cord compression at C-spine.\" States he needs to contact a surgeon re: cord compression.  States he attempted to stop taking Sinemet for PD - resulted with increased bradykinesia.  Severe compression at wrist / carpel tunnel bilaterally. States he has not fallen recently.    Performing HEP?: Yes    Precautions  Precautions  Precautions Comment: high fall risk - PD, severe back stenosis, OA of L knee, R knee replacement  Pain  Pain Assessment: 0-10  0-10 (Numeric) Pain Score: 2  Pain Type: Chronic pain  Pain Location: Back    Objective    Arrives using rollator - shortened, narrowed steps.  Poor LE control.   Outcome Measures:  MiniBESTest:   Anticipatory:   1,0,0 = 1  Reactive postural control = 0  Sensory orientation: 2,1,0 = 3  Dynamic gait: 2,1,1,0,0 = 4 w/rollator.  Total:  = 28.5%  (Cut off score for non-fallers is >69%)   *Previous score of  = 42%      TU seconds w/FWW  5 x sit to stand: 28 seconds with UE support  10 MWT: 14.5 sec with FWW.  mCTSIB: 30,30, 7,7 = 74/120 = " "61%  Treatments:    Therapeutic Exercise  Therapeutic Exercise Activity 2: Supine shoulder flex: wand  x 1 min  Therapeutic Exercise Activity 3: Lars head press: x 1 min  Therapeutic Exercise Activity 4: Lars shoulder press x 1 min  Therapeutic Exercise Activity 5: Bridges: 10# at hips - 2 x 1 min  Therapeutic Exercise Activity 7: Supine clams iso's: GTB x 1 min ea  Therapeutic Exercise Activity 8: Supine MIP: 2#- 2  x 10  Therapeutic Exercise Activity 11: Sit to stand: AirEx pad 2 x 5  Therapeutic Exercise Activity 12: Seated lateral step overs: x 1 min - B      Therapeutic Activity  Therapeutic Activity 1: See measures and goal status.    OP EDUCATION:  Outpatient Education  Education Comment: Edu re: entering the closet with a single task as getting in requires multi-tasking of enteriing a small space while thinking of clothing to pick out .  Patient to practice singular focus in small space. Education re: current POC ending until cord compression is addressed.     Assessment:  PT Assessment  Assessment Comment: Patient reporting that he has not fallen in 1 week.  Swelling at L foot/ankle prohibits use of AFO that was making him more stable during walking.  Just being \"more careful.\"  Patient continues to have significant back and knee issues due to degenerative changes.  Carpel tunnel bilaterally making rollator use more difficult. Cervical cord compression recently identified and client will seek specialist.  To end current therapy POC as client looks into next medical intervention for problems. Has HEP that he willl continue to perform.    Plan:  OP PT Plan  PT Plan:  (Discharge to HEP)  Number of Treatments Authorized: 28    Goals:  Resolved       PT Problem       PT Goal 1 (Not met)       Start:  24    Expected End:  25    Resolved:  25      LT) Progress HEP and OMID within pt tolerance to goal attainment, including return to ADL/IADL and functional activities w/ less fall " potential/improved safety as measured by 5 point improvement in MiniBEST test:  PROGRESSED TOWARD.   8/28 (on 2/11/25) vs  12/28 (on 11/13/24) vs   8/28 (on 9/13/24)  2)  Pt. will have improved static balance ability as evidenced by performing static standing for 2 minutes on foam surface and to demonstrate improved dynamic balance by performing LSVT modified standing forward step, side step and backwards step independently without falls. NOT ACHIEVED.   3)  Pt. will have safe, independent ambulation with safest/least restrictive assistive device for community distances and stairs/curbs with normalized gait pattern.  PROGRESSING TOWARDS. DEMONSTRATES IMPROVED FOOT/ANKLE CONTROL W/AFO. PERFORMANCE VARIES WITH KNEE AND BACK PAIN.   4) Pt. will have at least 8 point improvement in functional test score- MiniBEST test to > 15/28 to demonstrate reduced fall risk. PROGRESSING TOWARD. SEE ABOVE FOR SCORES. CURRENTLY 8/28.   5)  Pt will report no falling for 1 week to demonstrate significant progress in safe home and community access, understanding of movement principles specific to PD related problems of multi-tasking in addition to his many orthopedic complications by discharge. ACHIEVED - BUT REMAINS AT RISK.         Patient Stated Goal 1 (Not met)       Start:  02/05/24    Expected End:  02/13/25    Resolved:  05/07/25    1) Safely perform all daily activities without falling for 3-4 weeks by using appropriate device and movement strategies by discharge.  PROGRESSED TOWARD.   PERFORMANCE VARIES.                Time Calculation  Start Time: 1400  Stop Time: 1445  Time Calculation (min): 45 min  PT Therapeutic Procedures Time Entry  Therapeutic Exercise Time Entry: 13  Therapeutic Activity Time Entry: 25,

## 2025-05-22 ENCOUNTER — APPOINTMENT (OUTPATIENT)
Dept: NEUROLOGY | Facility: CLINIC | Age: 75
End: 2025-05-22
Payer: MEDICARE

## 2025-06-06 ENCOUNTER — HOSPITAL ENCOUNTER (OUTPATIENT)
Dept: RADIOLOGY | Facility: CLINIC | Age: 75
Discharge: HOME | End: 2025-06-06
Payer: MEDICARE

## 2025-06-06 ENCOUNTER — APPOINTMENT (OUTPATIENT)
Dept: ORTHOPEDIC SURGERY | Facility: CLINIC | Age: 75
End: 2025-06-06
Payer: MEDICARE

## 2025-06-06 DIAGNOSIS — M48.061 SPINAL STENOSIS OF LUMBAR REGION, UNSPECIFIED WHETHER NEUROGENIC CLAUDICATION PRESENT: ICD-10-CM

## 2025-06-06 DIAGNOSIS — M43.12 SPONDYLOLISTHESIS OF CERVICAL REGION: ICD-10-CM

## 2025-06-06 DIAGNOSIS — M48.02 CERVICAL SPINAL STENOSIS: ICD-10-CM

## 2025-06-06 DIAGNOSIS — M40.202 KYPHOSIS OF CERVICAL REGION, UNSPECIFIED KYPHOSIS TYPE: ICD-10-CM

## 2025-06-06 DIAGNOSIS — G95.9 CERVICAL MYELOPATHY WITH CERVICAL RADICULOPATHY: Primary | ICD-10-CM

## 2025-06-06 DIAGNOSIS — G95.20 CERVICAL SPINAL CORD COMPRESSION: ICD-10-CM

## 2025-06-06 DIAGNOSIS — M54.12 CERVICAL MYELOPATHY WITH CERVICAL RADICULOPATHY: Primary | ICD-10-CM

## 2025-06-06 PROCEDURE — 99205 OFFICE O/P NEW HI 60 MIN: CPT | Performed by: ORTHOPAEDIC SURGERY

## 2025-06-06 PROCEDURE — 72050 X-RAY EXAM NECK SPINE 4/5VWS: CPT | Performed by: RADIOLOGY

## 2025-06-06 PROCEDURE — 1159F MED LIST DOCD IN RCRD: CPT | Performed by: ORTHOPAEDIC SURGERY

## 2025-06-06 PROCEDURE — 72050 X-RAY EXAM NECK SPINE 4/5VWS: CPT

## 2025-06-06 ASSESSMENT — PAIN SCALES - GENERAL: PAINLEVEL_OUTOF10: 5 - MODERATE PAIN

## 2025-06-06 ASSESSMENT — PAIN - FUNCTIONAL ASSESSMENT: PAIN_FUNCTIONAL_ASSESSMENT: 0-10

## 2025-06-06 NOTE — PROGRESS NOTES
HPI:Edilson Cantu is a 74-year-old man, with a past medical history significant for Parkinson syndrome, who comes in today with progressive difficulty with balance, coordination, and bilateral hand upper extremity numbness and tingling.  He recently had carpal tunnel surgery done in his left hand for the numbness.  He has noticed no significant change.  His hand surgeon got an MRI of his cervical spine.  He comes in to review that.  The patient is currently a Nurick IV pathic patient, who is dependent on his walker for ambulation.  He is having a hard time buttoning his shirt, and frequently drops things secondary to his decreased coordination.  This has been progressively worsening over the course of the last year or 2.      ROS:  Reviewed on EMR and patient intake sheet.    PMH/SH:   Reviewed on EMR and patient intake sheet.    Exam:  Physical Exam    Constitutional: Well appearing; no acute distress  Eyes: pupils are equal and round  Psych: normal affect  Respiratory: non-labored breathing  Cardiovascular: regular rate and rhythm  GI: non-distended abdomen  Musculoskeletal: no pain with range of motion of the shoulders bilaterally; no signs of impingement  Neurologic: [4 -]/5 strength in the upper extremities bilaterally]; [negative] Esposito's; [no hyper-reflexia]    Radiology:  X-rays demonstrate global cervical kyphosis with 19 degrees of kyphosis from C2-C7.  He has a spondylolisthesis at C2-C3 and at C7-T1.  MRI demonstrates severe spinal cord compression at C2-3 and at C5-C6 with evidence of cord signal change at C3-C3.  Multilevel cervical spondylosis is noted in conjunction with his global cervical kyphoscoliosis.    Diagnosis:  Cervical spondylotic myelopathy; cervical spinal cord compression; cervical kyphoscoliosis    Assessment and Plan:   74-year-old man with a very complex problem including progressive cervical spondylotic myelopathy, Nurick grade IV, who has had progressive neurologic deterioration  which is affecting his daily life including ambulation, and daily activities with his hands.  This situation will not improve with further nonoperative management.  His neurologic function will continue to deteriorate.  He has been doing extensive physical therapy over the course of 6 months with only progressive neurologic deterioration.  We discussed surgical intervention to decompress and stabilize the cervical spine to prevent further neurologic worsening.  Given his global cervical kyphosis, his spondylolisthesis, and his multilevel cord compression, he would need an anterior posterior cervical decompression and fusion.  We would need to do a corpectomy of C3, C4, C5 and C6, with an anterior cervical fusion from C2-C7.  This would then be followed by posterior cervical stabilization from C2-T2 to address his global kyphoscoliotic deformity.  Relative CPT codes include: 10290 for the corpectomy at C3 followed by 63082 x 3 for the corpectomy of C4-5 and 6 followed by anterior cervical fusion, 28551 at C2-3 followed by 22585 x 4 for the fusion from C3-C7, followed by 74644 for the fibular strut allograft followed by 50407 for the anterior cervical plate followed by 42693 for the posterior cervical fusion at C2-3 followed by 22614 x 6 for the fusion from C3-T2 followed by 01200 for the posterior cervical instrumentation.    Patient understands that his Parkinson syndrome will place him at slightly increased risk for neuromuscular control problems after surgery which may affect postoperative bracing concerns.    The patient is a non-smoker.    We discussed surgery today at length, including the specifics of the actual proposed procedure, the projected hospital course and post-operative recovery or rehabilitation, and the expected results of this surgery.  The potential benefits and risks of the proposed surgery include, but are not limited to, those of infection, spinal fluid leaks, nerve injury or paralysis,  whether that be complete or partial paralysis, C5 palsy, dysphagia, hoarseness or vocal cord paralysis, instrumentation failure, non-union, future adjacent segment disease, epidural hematoma, wound dehiscence, esophageal injury, vascular injury, and the general risks of anaesthesia including, but not limited to those of stroke, heart attack, respiratory difficulties and death.  The patient understands that there are no guarantees in regard to outcome or potential complications associated with the proposed procedure. After this discussion, the patient articulated understanding of the topics covered and felt that all questions had been covered and answered satisfactorily.      Patient would like proceed to soon as possible.    At the end of the visit, I asked the patient if there were any further questions.  Although no further questions were provided at this time, I would be happy to see the patient back in the future to discuss any further questions or concerns.    Ruben Arteaga MD    Chief of Spine Surgery, [facility]  Director of Spine Service, [facility]  , Department of Orthopaedics  [facility] [employer] [employer] [facility]  [address] [place] [address]  P: [phone]  [facility]    This note was dictated with voice recognition software.  It has not been proofread for grammatical errors, typographical mistakes or other semantic inconsistencies.

## 2025-06-06 NOTE — LETTER
June 6, 2025     Juliano Weinstein DO  [address]  [address]  [facility] [address]    Patient: Edilson Cantu   YOB: 1950   Date of Visit: 6/6/2025       Dear Dr. Juliano Weinstein DO:    Thank you for referring Edilson Cantu to me for evaluation. Below are my notes for this consultation.  If you have questions, please do not hesitate to call me. I look forward to following your patient along with you.       Sincerely,     Ruben Arteaga MD      CC: No Recipients  ______________________________________________________________________________________    HPI:Edilson Cantu is a 74-year-old man, with a past medical history significant for Parkinson syndrome, who comes in today with progressive difficulty with balance, coordination, and bilateral hand upper extremity numbness and tingling.  He recently had carpal tunnel surgery done in his left hand for the numbness.  He has noticed no significant change.  His hand surgeon got an MRI of his cervical spine.  He comes in to review that.  The patient is currently a Nurick IV pathic patient, who is dependent on his walker for ambulation.  He is having a hard time buttoning his shirt, and frequently drops things secondary to his decreased coordination.  This has been progressively worsening over the course of the last year or 2.      ROS:  Reviewed on EMR and patient intake sheet.    PMH/SH:   Reviewed on EMR and patient intake sheet.    Exam:  Physical Exam    Constitutional: Well appearing; no acute distress  Eyes: pupils are equal and round  Psych: normal affect  Respiratory: non-labored breathing  Cardiovascular: regular rate and rhythm  GI: non-distended abdomen  Musculoskeletal: no pain with range of motion of the shoulders bilaterally; no signs of impingement  Neurologic: [4 -]/5 strength in the upper extremities bilaterally]; [negative] Esposito's; [no hyper-reflexia]    Radiology:  X-rays demonstrate global cervical kyphosis with 19 degrees of kyphosis  from C2-C7.  He has a spondylolisthesis at C2-C3 and at C7-T1.  MRI demonstrates severe spinal cord compression at C2-3 and at C5-C6 with evidence of cord signal change at C3-C3.  Multilevel cervical spondylosis is noted in conjunction with his global cervical kyphoscoliosis.    Diagnosis:  Cervical spondylotic myelopathy; cervical spinal cord compression; cervical kyphoscoliosis    Assessment and Plan:   74-year-old man with a very complex problem including progressive cervical spondylotic myelopathy, Nurick grade IV, who has had progressive neurologic deterioration which is affecting his daily life including ambulation, and daily activities with his hands.  This situation will not improve with further nonoperative management.  His neurologic function will continue to deteriorate.  He has been doing extensive physical therapy over the course of 6 months with only progressive neurologic deterioration.  We discussed surgical intervention to decompress and stabilize the cervical spine to prevent further neurologic worsening.  Given his global cervical kyphosis, his spondylolisthesis, and his multilevel cord compression, he would need an anterior posterior cervical decompression and fusion.  We would need to do a corpectomy of C3, C4, C5 and C6, with an anterior cervical fusion from C2-C7.  This would then be followed by posterior cervical stabilization from C2-T2 to address his global kyphoscoliotic deformity.  Relative CPT codes include: 31594 for the corpectomy at C3 followed by 63082 x 3 for the corpectomy of C4-5 and 6 followed by anterior cervical fusion, 24719 at C2-3 followed by 22585 x 4 for the fusion from C3-C7, followed by 82461 for the fibular strut allograft followed by 14207 for the anterior cervical plate followed by 79708 for the posterior cervical fusion at C2-3 followed by 22614 x 6 for the fusion from C3-T2 followed by 85040 for the posterior cervical instrumentation.    Patient understands that his  Parkinson syndrome will place him at slightly increased risk for neuromuscular control problems after surgery which may affect postoperative bracing concerns.    The patient is a non-smoker.    We discussed surgery today at length, including the specifics of the actual proposed procedure, the projected hospital course and post-operative recovery or rehabilitation, and the expected results of this surgery.  The potential benefits and risks of the proposed surgery include, but are not limited to, those of infection, spinal fluid leaks, nerve injury or paralysis, whether that be complete or partial paralysis, C5 palsy, dysphagia, hoarseness or vocal cord paralysis, instrumentation failure, non-union, future adjacent segment disease, epidural hematoma, wound dehiscence, esophageal injury, vascular injury, and the general risks of anaesthesia including, but not limited to those of stroke, heart attack, respiratory difficulties and death.  The patient understands that there are no guarantees in regard to outcome or potential complications associated with the proposed procedure. After this discussion, the patient articulated understanding of the topics covered and felt that all questions had been covered and answered satisfactorily.      Patient would like proceed to soon as possible.    At the end of the visit, I asked the patient if there were any further questions.  Although no further questions were provided at this time, I would be happy to see the patient back in the future to discuss any further questions or concerns.    Ruben Arteaga MD    Chief of Spine Surgery, [facility]  Director of Spine Service, [facility]  , Department of Orthopaedics  [facility]  [address] [place] [address]  P: [phone]  [facility].[employer]    This note was dictated with voice recognition software.  It  has not been proofread for grammatical errors, typographical mistakes or other semantic inconsistencies.

## 2025-06-09 ENCOUNTER — TRANSCRIBE ORDERS (OUTPATIENT)
Dept: ORTHOPEDIC SURGERY | Facility: HOSPITAL | Age: 75
End: 2025-06-09
Payer: MEDICARE

## 2025-06-09 DIAGNOSIS — G95.20 SPINAL CORD COMPRESSION (MULTI): ICD-10-CM

## 2025-06-09 DIAGNOSIS — M48.02 SPINAL STENOSIS IN CERVICAL REGION: ICD-10-CM

## 2025-06-09 DIAGNOSIS — M47.12 CERVICAL SPONDYLOSIS WITH MYELOPATHY: ICD-10-CM

## 2025-07-03 ENCOUNTER — ANESTHESIA EVENT (OUTPATIENT)
Dept: OPERATING ROOM | Facility: HOSPITAL | Age: 75
End: 2025-07-03
Payer: MEDICARE

## 2025-07-03 ENCOUNTER — TRANSCRIBE ORDERS (OUTPATIENT)
Dept: ORTHOPEDIC SURGERY | Facility: HOSPITAL | Age: 75
End: 2025-07-03
Payer: MEDICARE

## 2025-07-03 DIAGNOSIS — G95.9 CERVICAL MYELOPATHY WITH CERVICAL RADICULOPATHY: ICD-10-CM

## 2025-07-03 DIAGNOSIS — M54.12 CERVICAL MYELOPATHY WITH CERVICAL RADICULOPATHY: ICD-10-CM

## 2025-07-03 DIAGNOSIS — G20.A1 PARKINSON'S DISEASE WITHOUT DYSKINESIA OR FLUCTUATING MANIFESTATIONS: ICD-10-CM

## 2025-07-03 DIAGNOSIS — Z01.812 ENCOUNTER FOR PRE-OPERATIVE LABORATORY TESTING: ICD-10-CM

## 2025-07-03 DIAGNOSIS — M54.9 SPINE PAIN: ICD-10-CM

## 2025-07-03 DIAGNOSIS — S12.9XXA CLOSED FRACTURE OF CERVICAL VERTEBRA, UNSPECIFIED CERVICAL VERTEBRAL LEVEL, INITIAL ENCOUNTER: ICD-10-CM

## 2025-07-03 RX ORDER — CARBIDOPA AND LEVODOPA 25; 100 MG/1; MG/1
2 TABLET ORAL
Qty: 720 TABLET | Refills: 1 | Status: SHIPPED | OUTPATIENT
Start: 2025-07-03 | End: 2025-07-03

## 2025-07-07 ENCOUNTER — ANESTHESIA (OUTPATIENT)
Dept: OPERATING ROOM | Facility: HOSPITAL | Age: 75
End: 2025-07-07
Payer: MEDICARE

## 2025-07-07 ENCOUNTER — APPOINTMENT (OUTPATIENT)
Dept: RADIOLOGY | Facility: HOSPITAL | Age: 75
End: 2025-07-07
Payer: MEDICARE

## 2025-07-07 ENCOUNTER — HOSPITAL ENCOUNTER (INPATIENT)
Facility: HOSPITAL | Age: 75
LOS: 3 days | Discharge: NURSING, REHAB OR LONG-TERM CARE | End: 2025-07-10
Attending: ORTHOPAEDIC SURGERY | Admitting: ORTHOPAEDIC SURGERY
Payer: MEDICARE

## 2025-07-07 DIAGNOSIS — G95.20 SPINAL CORD COMPRESSION (MULTI): ICD-10-CM

## 2025-07-07 DIAGNOSIS — M48.02 SPINAL STENOSIS IN CERVICAL REGION: ICD-10-CM

## 2025-07-07 DIAGNOSIS — M47.12 CERVICAL SPONDYLOSIS WITH MYELOPATHY: Primary | ICD-10-CM

## 2025-07-07 LAB
ALBUMIN SERPL BCP-MCNC: 4.4 G/DL (ref 3.4–5)
ALP SERPL-CCNC: 106 U/L (ref 33–136)
ALT SERPL W P-5'-P-CCNC: 10 U/L (ref 10–52)
ANION GAP SERPL CALC-SCNC: 14 MMOL/L (ref 10–20)
AST SERPL W P-5'-P-CCNC: 14 U/L (ref 9–39)
BILIRUB SERPL-MCNC: 0.8 MG/DL (ref 0–1.2)
BUN SERPL-MCNC: 18 MG/DL (ref 6–23)
CALCIUM SERPL-MCNC: 9.6 MG/DL (ref 8.6–10.3)
CHLORIDE SERPL-SCNC: 107 MMOL/L (ref 98–107)
CO2 SERPL-SCNC: 23 MMOL/L (ref 21–32)
CREAT SERPL-MCNC: 0.8 MG/DL (ref 0.5–1.3)
EGFRCR SERPLBLD CKD-EPI 2021: >90 ML/MIN/1.73M*2
ERYTHROCYTE [DISTWIDTH] IN BLOOD BY AUTOMATED COUNT: 13.2 % (ref 11.5–14.5)
GLUCOSE SERPL-MCNC: 83 MG/DL (ref 74–99)
HCT VFR BLD AUTO: 43.5 % (ref 41–52)
HGB BLD-MCNC: 14.1 G/DL (ref 13.5–17.5)
MCH RBC QN AUTO: 30.3 PG (ref 26–34)
MCHC RBC AUTO-ENTMCNC: 32.4 G/DL (ref 32–36)
MCV RBC AUTO: 94 FL (ref 80–100)
NRBC BLD-RTO: 0 /100 WBCS (ref 0–0)
PLATELET # BLD AUTO: 222 X10*3/UL (ref 150–450)
POTASSIUM SERPL-SCNC: 4.3 MMOL/L (ref 3.5–5.3)
PROT SERPL-MCNC: 7.1 G/DL (ref 6.4–8.2)
RBC # BLD AUTO: 4.65 X10*6/UL (ref 4.5–5.9)
SODIUM SERPL-SCNC: 140 MMOL/L (ref 136–145)
WBC # BLD AUTO: 4.5 X10*3/UL (ref 4.4–11.3)

## 2025-07-07 PROCEDURE — 22554 ARTHRD ANT NTRBD MIN DSC CRV: CPT

## 2025-07-07 PROCEDURE — 63082 REMOVE VERTEBRAL BODY ADD-ON: CPT

## 2025-07-07 PROCEDURE — 3600000017 HC OR TIME - EACH INCREMENTAL 1 MINUTE - PROCEDURE LEVEL SIX: Performed by: ORTHOPAEDIC SURGERY

## 2025-07-07 PROCEDURE — 22614 ARTHRD PST TQ 1NTRSPC EA ADD: CPT

## 2025-07-07 PROCEDURE — 2500000004 HC RX 250 GENERAL PHARMACY W/ HCPCS (ALT 636 FOR OP/ED): Performed by: INTERNAL MEDICINE

## 2025-07-07 PROCEDURE — 76000 FLUOROSCOPY <1 HR PHYS/QHP: CPT

## 2025-07-07 PROCEDURE — 0RG60K1 FUSION OF THORACIC VERTEBRAL JOINT WITH NONAUTOLOGOUS TISSUE SUBSTITUTE, POSTERIOR APPROACH, POSTERIOR COLUMN, OPEN APPROACH: ICD-10-PCS | Performed by: ORTHOPAEDIC SURGERY

## 2025-07-07 PROCEDURE — 22554 ARTHRD ANT NTRBD MIN DSC CRV: CPT | Performed by: ORTHOPAEDIC SURGERY

## 2025-07-07 PROCEDURE — 2500000004 HC RX 250 GENERAL PHARMACY W/ HCPCS (ALT 636 FOR OP/ED)

## 2025-07-07 PROCEDURE — 7100000002 HC RECOVERY ROOM TIME - EACH INCREMENTAL 1 MINUTE: Performed by: ORTHOPAEDIC SURGERY

## 2025-07-07 PROCEDURE — 0RG20K0 FUSION OF 2 OR MORE CERVICAL VERTEBRAL JOINTS WITH NONAUTOLOGOUS TISSUE SUBSTITUTE, ANTERIOR APPROACH, ANTERIOR COLUMN, OPEN APPROACH: ICD-10-PCS | Performed by: ORTHOPAEDIC SURGERY

## 2025-07-07 PROCEDURE — 99024 POSTOP FOLLOW-UP VISIT: CPT

## 2025-07-07 PROCEDURE — 22585 ARTHRD ANT NTRBD MIN DSC EA: CPT

## 2025-07-07 PROCEDURE — 3700000001 HC GENERAL ANESTHESIA TIME - INITIAL BASE CHARGE: Performed by: ORTHOPAEDIC SURGERY

## 2025-07-07 PROCEDURE — 22843 INSERT SPINE FIXATION DEVICE: CPT

## 2025-07-07 PROCEDURE — 22600 ARTHRD PST TQ 1NTRSPC CRV: CPT

## 2025-07-07 PROCEDURE — C1713 ANCHOR/SCREW BN/BN,TIS/BN: HCPCS | Performed by: ORTHOPAEDIC SURGERY

## 2025-07-07 PROCEDURE — 2500000004 HC RX 250 GENERAL PHARMACY W/ HCPCS (ALT 636 FOR OP/ED): Mod: JZ | Performed by: ANESTHESIOLOGIST ASSISTANT

## 2025-07-07 PROCEDURE — 3600000018 HC OR TIME - INITIAL BASE CHARGE - PROCEDURE LEVEL SIX: Performed by: ORTHOPAEDIC SURGERY

## 2025-07-07 PROCEDURE — 2720000007 HC OR 272 NO HCPCS: Performed by: ORTHOPAEDIC SURGERY

## 2025-07-07 PROCEDURE — 22843 INSERT SPINE FIXATION DEVICE: CPT | Performed by: ORTHOPAEDIC SURGERY

## 2025-07-07 PROCEDURE — 20931 SP BONE ALGRFT STRUCT ADD-ON: CPT | Performed by: ORTHOPAEDIC SURGERY

## 2025-07-07 PROCEDURE — 0RG20K1 FUSION OF 2 OR MORE CERVICAL VERTEBRAL JOINTS WITH NONAUTOLOGOUS TISSUE SUBSTITUTE, POSTERIOR APPROACH, POSTERIOR COLUMN, OPEN APPROACH: ICD-10-PCS | Performed by: ORTHOPAEDIC SURGERY

## 2025-07-07 PROCEDURE — 22846 INSERT SPINE FIXATION DEVICE: CPT | Performed by: ORTHOPAEDIC SURGERY

## 2025-07-07 PROCEDURE — 2500000001 HC RX 250 WO HCPCS SELF ADMINISTERED DRUGS (ALT 637 FOR MEDICARE OP)

## 2025-07-07 PROCEDURE — 9420000001 HC RT PATIENT EDUCATION 5 MIN

## 2025-07-07 PROCEDURE — 22846 INSERT SPINE FIXATION DEVICE: CPT

## 2025-07-07 PROCEDURE — 80053 COMPREHEN METABOLIC PANEL: CPT | Performed by: ORTHOPAEDIC SURGERY

## 2025-07-07 PROCEDURE — 72020 X-RAY EXAM OF SPINE 1 VIEW: CPT

## 2025-07-07 PROCEDURE — 2500000005 HC RX 250 GENERAL PHARMACY W/O HCPCS: Performed by: ORTHOPAEDIC SURGERY

## 2025-07-07 PROCEDURE — 2500000005 HC RX 250 GENERAL PHARMACY W/O HCPCS: Performed by: ANESTHESIOLOGY

## 2025-07-07 PROCEDURE — 0RG40K1 FUSION OF CERVICOTHORACIC VERTEBRAL JOINT WITH NONAUTOLOGOUS TISSUE SUBSTITUTE, POSTERIOR APPROACH, POSTERIOR COLUMN, OPEN APPROACH: ICD-10-PCS | Performed by: ORTHOPAEDIC SURGERY

## 2025-07-07 PROCEDURE — 22600 ARTHRD PST TQ 1NTRSPC CRV: CPT | Performed by: ORTHOPAEDIC SURGERY

## 2025-07-07 PROCEDURE — 3700000002 HC GENERAL ANESTHESIA TIME - EACH INCREMENTAL 1 MINUTE: Performed by: ORTHOPAEDIC SURGERY

## 2025-07-07 PROCEDURE — 63082 REMOVE VERTEBRAL BODY ADD-ON: CPT | Performed by: ORTHOPAEDIC SURGERY

## 2025-07-07 PROCEDURE — 1100000001 HC PRIVATE ROOM DAILY

## 2025-07-07 PROCEDURE — 20930 SP BONE ALGRFT MORSEL ADD-ON: CPT | Performed by: ORTHOPAEDIC SURGERY

## 2025-07-07 PROCEDURE — 0RB30ZZ EXCISION OF CERVICAL VERTEBRAL DISC, OPEN APPROACH: ICD-10-PCS | Performed by: ORTHOPAEDIC SURGERY

## 2025-07-07 PROCEDURE — 22585 ARTHRD ANT NTRBD MIN DSC EA: CPT | Performed by: ORTHOPAEDIC SURGERY

## 2025-07-07 PROCEDURE — 63081 REMOVE VERT BODY DCMPRN CRVL: CPT | Performed by: ORTHOPAEDIC SURGERY

## 2025-07-07 PROCEDURE — 2500000004 HC RX 250 GENERAL PHARMACY W/ HCPCS (ALT 636 FOR OP/ED): Performed by: ORTHOPAEDIC SURGERY

## 2025-07-07 PROCEDURE — 7100000001 HC RECOVERY ROOM TIME - INITIAL BASE CHARGE: Performed by: ORTHOPAEDIC SURGERY

## 2025-07-07 PROCEDURE — 22614 ARTHRD PST TQ 1NTRSPC EA ADD: CPT | Performed by: ORTHOPAEDIC SURGERY

## 2025-07-07 PROCEDURE — 2500000005 HC RX 250 GENERAL PHARMACY W/O HCPCS

## 2025-07-07 PROCEDURE — C1762 CONN TISS, HUMAN(INC FASCIA): HCPCS | Performed by: ORTHOPAEDIC SURGERY

## 2025-07-07 PROCEDURE — 2780000003 HC OR 278 NO HCPCS: Performed by: ORTHOPAEDIC SURGERY

## 2025-07-07 PROCEDURE — 36415 COLL VENOUS BLD VENIPUNCTURE: CPT | Mod: AHULAB | Performed by: ORTHOPAEDIC SURGERY

## 2025-07-07 PROCEDURE — 63081 REMOVE VERT BODY DCMPRN CRVL: CPT

## 2025-07-07 PROCEDURE — 85027 COMPLETE CBC AUTOMATED: CPT | Performed by: ORTHOPAEDIC SURGERY

## 2025-07-07 DEVICE — ROD, VERTEX 3.5 X 240 TI: Type: IMPLANTABLE DEVICE | Site: SPINE CERVICAL | Status: FUNCTIONAL

## 2025-07-07 DEVICE — ORTHOBLAST II PUTTY, 10CC - DERIVED FROM SELECTED DONATED HUMAN BONE TISSUE THAT HAS BEEN PROCESSED INTO PARTICLES. THE PARTICLES ARE SUBSEQUENTLY DEMINERALIZED USING A HYDROCHLORIC ACID PROCESS. THE DEMINERALIZED BONE MATRIX (DBM) IS COMBINED WITH A REVERSE PHASE CARRIER, CANCELLOUS CHIPS FROM THE SAME DONOR, AND THEN FORMULATED TO A PASTE OR PUTTY-LIKE CONSISTENCY.
Type: IMPLANTABLE DEVICE | Site: SPINE CERVICAL | Status: FUNCTIONAL
Brand: ORTHOBLAST II PUTTY

## 2025-07-07 DEVICE — IMPLANTABLE DEVICE: Type: IMPLANTABLE DEVICE | Site: SPINE CERVICAL | Status: FUNCTIONAL

## 2025-07-07 DEVICE — SCREW, INFINITY, MULTI AXIAL, 3.5 X 14: Type: IMPLANTABLE DEVICE | Site: SPINE CERVICAL | Status: FUNCTIONAL

## 2025-07-07 DEVICE — BONE CHIPS, CANCELL 30CC 1-4MM: Type: IMPLANTABLE DEVICE | Site: SPINE CERVICAL | Status: FUNCTIONAL

## 2025-07-07 DEVICE — SCREW, CERV 4.0 X 15 SD VA: Type: IMPLANTABLE DEVICE | Site: SPINE CERVICAL | Status: FUNCTIONAL

## 2025-07-07 RX ORDER — ONDANSETRON HYDROCHLORIDE 2 MG/ML
INJECTION, SOLUTION INTRAVENOUS AS NEEDED
Status: DISCONTINUED | OUTPATIENT
Start: 2025-07-07 | End: 2025-07-07

## 2025-07-07 RX ORDER — VANCOMYCIN HYDROCHLORIDE 1 G/20ML
INJECTION, POWDER, LYOPHILIZED, FOR SOLUTION INTRAVENOUS AS NEEDED
Status: DISCONTINUED | OUTPATIENT
Start: 2025-07-07 | End: 2025-07-07 | Stop reason: HOSPADM

## 2025-07-07 RX ORDER — ESMOLOL HYDROCHLORIDE 10 MG/ML
INJECTION INTRAVENOUS AS NEEDED
Status: DISCONTINUED | OUTPATIENT
Start: 2025-07-07 | End: 2025-07-07

## 2025-07-07 RX ORDER — VANCOMYCIN HYDROCHLORIDE 1 G/200ML
INJECTION, SOLUTION INTRAVENOUS AS NEEDED
Status: DISCONTINUED | OUTPATIENT
Start: 2025-07-07 | End: 2025-07-07

## 2025-07-07 RX ORDER — LIDOCAINE HYDROCHLORIDE 20 MG/ML
INJECTION, SOLUTION EPIDURAL; INFILTRATION; INTRACAUDAL; PERINEURAL AS NEEDED
Status: DISCONTINUED | OUTPATIENT
Start: 2025-07-07 | End: 2025-07-07

## 2025-07-07 RX ORDER — PHENYLEPHRINE 10 MG/250 ML(40 MCG/ML)IN 0.9 % SOD.CHLORIDE INTRAVENOUS
CONTINUOUS PRN
Status: DISCONTINUED | OUTPATIENT
Start: 2025-07-07 | End: 2025-07-07

## 2025-07-07 RX ORDER — CYCLOBENZAPRINE HCL 10 MG
10 TABLET ORAL 3 TIMES DAILY PRN
Qty: 30 TABLET | Refills: 0 | Status: SHIPPED | OUTPATIENT
Start: 2025-07-07 | End: 2025-07-17

## 2025-07-07 RX ORDER — KETOROLAC TROMETHAMINE 30 MG/ML
INJECTION, SOLUTION INTRAMUSCULAR; INTRAVENOUS AS NEEDED
Status: DISCONTINUED | OUTPATIENT
Start: 2025-07-07 | End: 2025-07-07

## 2025-07-07 RX ORDER — ACETAMINOPHEN 500 MG
1000 TABLET ORAL EVERY 6 HOURS PRN
Qty: 56 TABLET | Refills: 0 | Status: SHIPPED | OUTPATIENT
Start: 2025-07-07 | End: 2025-07-14

## 2025-07-07 RX ORDER — SODIUM CHLORIDE, SODIUM LACTATE, POTASSIUM CHLORIDE, CALCIUM CHLORIDE 600; 310; 30; 20 MG/100ML; MG/100ML; MG/100ML; MG/100ML
100 INJECTION, SOLUTION INTRAVENOUS CONTINUOUS
Status: ACTIVE | OUTPATIENT
Start: 2025-07-07 | End: 2025-07-09

## 2025-07-07 RX ORDER — ONDANSETRON 4 MG/1
4 TABLET, FILM COATED ORAL EVERY 8 HOURS PRN
Status: DISCONTINUED | OUTPATIENT
Start: 2025-07-07 | End: 2025-07-10 | Stop reason: HOSPADM

## 2025-07-07 RX ORDER — INSULIN LISPRO 100 [IU]/ML
0-5 INJECTION, SOLUTION INTRAVENOUS; SUBCUTANEOUS
Status: DISCONTINUED | OUTPATIENT
Start: 2025-07-07 | End: 2025-07-10 | Stop reason: HOSPADM

## 2025-07-07 RX ORDER — FENTANYL CITRATE 50 UG/ML
INJECTION, SOLUTION INTRAMUSCULAR; INTRAVENOUS AS NEEDED
Status: DISCONTINUED | OUTPATIENT
Start: 2025-07-07 | End: 2025-07-07

## 2025-07-07 RX ORDER — POLYETHYLENE GLYCOL 3350 17 G/17G
17 POWDER, FOR SOLUTION ORAL DAILY
Status: DISCONTINUED | OUTPATIENT
Start: 2025-07-07 | End: 2025-07-10 | Stop reason: HOSPADM

## 2025-07-07 RX ORDER — VANCOMYCIN HYDROCHLORIDE 1 G/200ML
1 INJECTION, SOLUTION INTRAVENOUS EVERY 12 HOURS
Status: COMPLETED | OUTPATIENT
Start: 2025-07-07 | End: 2025-07-07

## 2025-07-07 RX ORDER — NALOXONE HYDROCHLORIDE 1 MG/ML
0.2 INJECTION INTRAMUSCULAR; INTRAVENOUS; SUBCUTANEOUS EVERY 5 MIN PRN
Status: DISCONTINUED | OUTPATIENT
Start: 2025-07-07 | End: 2025-07-10 | Stop reason: HOSPADM

## 2025-07-07 RX ORDER — BUPIVACAINE HCL/EPINEPHRINE 0.5-1:200K
VIAL (ML) INJECTION AS NEEDED
Status: DISCONTINUED | OUTPATIENT
Start: 2025-07-07 | End: 2025-07-07 | Stop reason: HOSPADM

## 2025-07-07 RX ORDER — CEFAZOLIN SODIUM 2 G/50ML
2 SOLUTION INTRAVENOUS EVERY 8 HOURS
Status: COMPLETED | OUTPATIENT
Start: 2025-07-07 | End: 2025-07-08

## 2025-07-07 RX ORDER — OXYCODONE HYDROCHLORIDE 5 MG/1
5 TABLET ORAL EVERY 4 HOURS PRN
Refills: 0 | Status: DISCONTINUED | OUTPATIENT
Start: 2025-07-07 | End: 2025-07-10 | Stop reason: HOSPADM

## 2025-07-07 RX ORDER — DROPERIDOL 2.5 MG/ML
0.62 INJECTION, SOLUTION INTRAMUSCULAR; INTRAVENOUS ONCE AS NEEDED
Status: DISCONTINUED | OUTPATIENT
Start: 2025-07-07 | End: 2025-07-07 | Stop reason: HOSPADM

## 2025-07-07 RX ORDER — DEXTROSE 50 % IN WATER (D50W) INTRAVENOUS SYRINGE
25
Status: DISCONTINUED | OUTPATIENT
Start: 2025-07-07 | End: 2025-07-10 | Stop reason: HOSPADM

## 2025-07-07 RX ORDER — ACETAMINOPHEN 325 MG/1
975 TABLET ORAL EVERY 8 HOURS
Status: DISCONTINUED | OUTPATIENT
Start: 2025-07-07 | End: 2025-07-10 | Stop reason: HOSPADM

## 2025-07-07 RX ORDER — OXYCODONE HYDROCHLORIDE 5 MG/1
5 TABLET ORAL
Status: DISCONTINUED | OUTPATIENT
Start: 2025-07-07 | End: 2025-07-07 | Stop reason: HOSPADM

## 2025-07-07 RX ORDER — SODIUM CHLORIDE, SODIUM LACTATE, POTASSIUM CHLORIDE, CALCIUM CHLORIDE 600; 310; 30; 20 MG/100ML; MG/100ML; MG/100ML; MG/100ML
INJECTION, SOLUTION INTRAVENOUS CONTINUOUS PRN
Status: DISCONTINUED | OUTPATIENT
Start: 2025-07-07 | End: 2025-07-07

## 2025-07-07 RX ORDER — PROPOFOL 10 MG/ML
INJECTION, EMULSION INTRAVENOUS AS NEEDED
Status: DISCONTINUED | OUTPATIENT
Start: 2025-07-07 | End: 2025-07-07

## 2025-07-07 RX ORDER — METHOCARBAMOL 500 MG/1
750 TABLET, FILM COATED ORAL EVERY 8 HOURS PRN
Status: DISCONTINUED | OUTPATIENT
Start: 2025-07-07 | End: 2025-07-10 | Stop reason: HOSPADM

## 2025-07-07 RX ORDER — DEXMEDETOMIDINE HYDROCHLORIDE 100 UG/ML
INJECTION, SOLUTION INTRAVENOUS AS NEEDED
Status: DISCONTINUED | OUTPATIENT
Start: 2025-07-07 | End: 2025-07-07

## 2025-07-07 RX ORDER — HYDROMORPHONE HYDROCHLORIDE 0.2 MG/ML
0.2 INJECTION INTRAMUSCULAR; INTRAVENOUS; SUBCUTANEOUS EVERY 4 HOURS PRN
Status: DISCONTINUED | OUTPATIENT
Start: 2025-07-07 | End: 2025-07-10 | Stop reason: HOSPADM

## 2025-07-07 RX ORDER — PHENYLEPHRINE HYDROCHLORIDE 10 MG/ML
INJECTION INTRAVENOUS AS NEEDED
Status: DISCONTINUED | OUTPATIENT
Start: 2025-07-07 | End: 2025-07-07

## 2025-07-07 RX ORDER — ROCURONIUM BROMIDE 10 MG/ML
INJECTION, SOLUTION INTRAVENOUS AS NEEDED
Status: DISCONTINUED | OUTPATIENT
Start: 2025-07-07 | End: 2025-07-07

## 2025-07-07 RX ORDER — DEXMEDETOMIDINE HYDROCHLORIDE 4 UG/ML
INJECTION, SOLUTION INTRAVENOUS CONTINUOUS PRN
Status: DISCONTINUED | OUTPATIENT
Start: 2025-07-07 | End: 2025-07-07

## 2025-07-07 RX ORDER — PHENYLEPHRINE HCL IN 0.9% NACL 1 MG/10 ML
SYRINGE (ML) INTRAVENOUS AS NEEDED
Status: DISCONTINUED | OUTPATIENT
Start: 2025-07-07 | End: 2025-07-07

## 2025-07-07 RX ORDER — OXYCODONE HYDROCHLORIDE 5 MG/1
5 TABLET ORAL EVERY 6 HOURS PRN
Qty: 28 TABLET | Refills: 0 | Status: SHIPPED | OUTPATIENT
Start: 2025-07-07 | End: 2025-07-14

## 2025-07-07 RX ORDER — ONDANSETRON HYDROCHLORIDE 2 MG/ML
4 INJECTION, SOLUTION INTRAVENOUS EVERY 8 HOURS PRN
Status: DISCONTINUED | OUTPATIENT
Start: 2025-07-07 | End: 2025-07-10 | Stop reason: HOSPADM

## 2025-07-07 RX ORDER — DOCUSATE SODIUM 100 MG/1
100 CAPSULE, LIQUID FILLED ORAL 3 TIMES DAILY PRN
Qty: 21 CAPSULE | Refills: 0 | Status: SHIPPED | OUTPATIENT
Start: 2025-07-07 | End: 2025-07-14

## 2025-07-07 RX ORDER — PIPERACILLIN SODIUM, TAZOBACTAM SODIUM 3; .375 G/15ML; G/15ML
INJECTION, POWDER, LYOPHILIZED, FOR SOLUTION INTRAVENOUS AS NEEDED
Status: DISCONTINUED | OUTPATIENT
Start: 2025-07-07 | End: 2025-07-07 | Stop reason: HOSPADM

## 2025-07-07 RX ORDER — SODIUM CHLORIDE, SODIUM LACTATE, POTASSIUM CHLORIDE, CALCIUM CHLORIDE 600; 310; 30; 20 MG/100ML; MG/100ML; MG/100ML; MG/100ML
100 INJECTION, SOLUTION INTRAVENOUS CONTINUOUS
Status: DISCONTINUED | OUTPATIENT
Start: 2025-07-07 | End: 2025-07-07 | Stop reason: HOSPADM

## 2025-07-07 RX ORDER — KETOROLAC TROMETHAMINE 30 MG/ML
15 INJECTION, SOLUTION INTRAMUSCULAR; INTRAVENOUS EVERY 6 HOURS PRN
Status: DISCONTINUED | OUTPATIENT
Start: 2025-07-07 | End: 2025-07-10 | Stop reason: HOSPADM

## 2025-07-07 RX ORDER — ONDANSETRON HYDROCHLORIDE 2 MG/ML
4 INJECTION, SOLUTION INTRAVENOUS ONCE AS NEEDED
Status: DISCONTINUED | OUTPATIENT
Start: 2025-07-07 | End: 2025-07-07 | Stop reason: HOSPADM

## 2025-07-07 RX ORDER — GENTAMICIN 40 MG/ML
INJECTION, SOLUTION INTRAMUSCULAR; INTRAVENOUS AS NEEDED
Status: DISCONTINUED | OUTPATIENT
Start: 2025-07-07 | End: 2025-07-07

## 2025-07-07 RX ORDER — CARBIDOPA AND LEVODOPA 25; 100 MG/1; MG/1
2 TABLET, ORALLY DISINTEGRATING ORAL 3 TIMES DAILY
Status: DISCONTINUED | OUTPATIENT
Start: 2025-07-07 | End: 2025-07-07

## 2025-07-07 RX ORDER — DEXAMETHASONE SODIUM PHOSPHATE 10 MG/ML
10 INJECTION INTRAMUSCULAR; INTRAVENOUS EVERY 8 HOURS SCHEDULED
Status: DISPENSED | OUTPATIENT
Start: 2025-07-07 | End: 2025-07-08

## 2025-07-07 RX ORDER — HYDRALAZINE HYDROCHLORIDE 20 MG/ML
5 INJECTION INTRAMUSCULAR; INTRAVENOUS EVERY 30 MIN PRN
Status: DISCONTINUED | OUTPATIENT
Start: 2025-07-07 | End: 2025-07-07 | Stop reason: HOSPADM

## 2025-07-07 RX ORDER — SODIUM CHLORIDE 0.9 G/100ML
INJECTION, SOLUTION IRRIGATION AS NEEDED
Status: DISCONTINUED | OUTPATIENT
Start: 2025-07-07 | End: 2025-07-07 | Stop reason: HOSPADM

## 2025-07-07 RX ORDER — POLYETHYLENE GLYCOL 3350 17 G/17G
17 POWDER, FOR SOLUTION ORAL DAILY
COMMUNITY

## 2025-07-07 RX ORDER — CARBIDOPA AND LEVODOPA 25; 100 MG/1; MG/1
1 TABLET, ORALLY DISINTEGRATING ORAL 3 TIMES DAILY
Status: DISCONTINUED | OUTPATIENT
Start: 2025-07-07 | End: 2025-07-07

## 2025-07-07 RX ORDER — SULFAMETHOXAZOLE AND TRIMETHOPRIM 800; 160 MG/1; MG/1
1 TABLET ORAL 2 TIMES DAILY
Qty: 40 TABLET | Refills: 0 | Status: SHIPPED | OUTPATIENT
Start: 2025-07-07

## 2025-07-07 RX ORDER — SCOPOLAMINE 1 MG/3D
1 PATCH, EXTENDED RELEASE TRANSDERMAL
Status: DISCONTINUED | OUTPATIENT
Start: 2025-07-07 | End: 2025-07-10 | Stop reason: HOSPADM

## 2025-07-07 RX ORDER — KETOROLAC TROMETHAMINE 10 MG/1
10 TABLET, FILM COATED ORAL EVERY 6 HOURS PRN
Qty: 15 TABLET | Refills: 0 | Status: SHIPPED | OUTPATIENT
Start: 2025-07-07 | End: 2025-07-12

## 2025-07-07 RX ORDER — CARBIDOPA AND LEVODOPA 25; 100 MG/1; MG/1
2 TABLET, ORALLY DISINTEGRATING ORAL 3 TIMES DAILY
COMMUNITY

## 2025-07-07 RX ORDER — CEFAZOLIN 1 G/1
INJECTION, POWDER, FOR SOLUTION INTRAVENOUS AS NEEDED
Status: DISCONTINUED | OUTPATIENT
Start: 2025-07-07 | End: 2025-07-07

## 2025-07-07 RX ORDER — DEXTROSE 50 % IN WATER (D50W) INTRAVENOUS SYRINGE
12.5
Status: DISCONTINUED | OUTPATIENT
Start: 2025-07-07 | End: 2025-07-10 | Stop reason: HOSPADM

## 2025-07-07 RX ORDER — HYDRALAZINE HYDROCHLORIDE 20 MG/ML
10 INJECTION INTRAMUSCULAR; INTRAVENOUS ONCE
Status: COMPLETED | OUTPATIENT
Start: 2025-07-07 | End: 2025-07-07

## 2025-07-07 RX ORDER — OXYCODONE HYDROCHLORIDE 5 MG/1
10 TABLET ORAL EVERY 4 HOURS PRN
Refills: 0 | Status: DISCONTINUED | OUTPATIENT
Start: 2025-07-07 | End: 2025-07-10 | Stop reason: HOSPADM

## 2025-07-07 RX ORDER — HYDROMORPHONE HYDROCHLORIDE 1 MG/ML
INJECTION, SOLUTION INTRAMUSCULAR; INTRAVENOUS; SUBCUTANEOUS AS NEEDED
Status: DISCONTINUED | OUTPATIENT
Start: 2025-07-07 | End: 2025-07-07

## 2025-07-07 RX ORDER — BISACODYL 5 MG
10 TABLET, DELAYED RELEASE (ENTERIC COATED) ORAL DAILY PRN
Status: DISCONTINUED | OUTPATIENT
Start: 2025-07-07 | End: 2025-07-10 | Stop reason: HOSPADM

## 2025-07-07 RX ORDER — CARBIDOPA AND LEVODOPA 25; 100 MG/1; MG/1
2 TABLET ORAL 3 TIMES DAILY
Status: DISCONTINUED | OUTPATIENT
Start: 2025-07-08 | End: 2025-07-10 | Stop reason: HOSPADM

## 2025-07-07 RX ADMIN — HYDROMORPHONE HYDROCHLORIDE 0.2 MG: 1 INJECTION, SOLUTION INTRAMUSCULAR; INTRAVENOUS; SUBCUTANEOUS at 14:56

## 2025-07-07 RX ADMIN — SODIUM CHLORIDE, SODIUM LACTATE, POTASSIUM CHLORIDE, AND CALCIUM CHLORIDE: .6; .31; .03; .02 INJECTION, SOLUTION INTRAVENOUS at 11:14

## 2025-07-07 RX ADMIN — KETOROLAC TROMETHAMINE 15 MG: 30 INJECTION, SOLUTION INTRAMUSCULAR at 15:45

## 2025-07-07 RX ADMIN — PROPOFOL 100 MG: 10 INJECTION, EMULSION INTRAVENOUS at 11:27

## 2025-07-07 RX ADMIN — PROPOFOL 30 MG: 10 INJECTION, EMULSION INTRAVENOUS at 11:41

## 2025-07-07 RX ADMIN — ESMOLOL HYDROCHLORIDE 20 MG: 100 INJECTION, SOLUTION INTRAVENOUS at 14:35

## 2025-07-07 RX ADMIN — PROPOFOL 20 MG: 10 INJECTION, EMULSION INTRAVENOUS at 12:00

## 2025-07-07 RX ADMIN — SUGAMMADEX 200 MG: 100 INJECTION, SOLUTION INTRAVENOUS at 16:27

## 2025-07-07 RX ADMIN — Medication 50 MCG: at 14:57

## 2025-07-07 RX ADMIN — ROCURONIUM BROMIDE 20 MG: 10 INJECTION, SOLUTION INTRAVENOUS at 14:28

## 2025-07-07 RX ADMIN — Medication 50 MCG: at 12:28

## 2025-07-07 RX ADMIN — SODIUM CHLORIDE: 9 INJECTION, SOLUTION INTRAVENOUS at 11:35

## 2025-07-07 RX ADMIN — Medication 8 L/MIN: at 16:35

## 2025-07-07 RX ADMIN — Medication 100 MCG: at 15:00

## 2025-07-07 RX ADMIN — CARBOXYMETHYLCELLULOSE SODIUM 2 DROP: 5 SOLUTION/ DROPS OPHTHALMIC at 11:29

## 2025-07-07 RX ADMIN — DEXMEDETOMIDINE HYDROCHLORIDE 8 MCG: 100 INJECTION, SOLUTION INTRAVENOUS at 12:13

## 2025-07-07 RX ADMIN — DEXAMETHASONE SODIUM PHOSPHATE 10 MG: 10 INJECTION, SOLUTION INTRAMUSCULAR; INTRAVENOUS at 21:19

## 2025-07-07 RX ADMIN — HYDROMORPHONE HYDROCHLORIDE 0.2 MG: 1 INJECTION, SOLUTION INTRAMUSCULAR; INTRAVENOUS; SUBCUTANEOUS at 15:27

## 2025-07-07 RX ADMIN — VANCOMYCIN HYDROCHLORIDE 1 G: 1 INJECTION, SOLUTION INTRAVENOUS at 21:28

## 2025-07-07 RX ADMIN — CEFAZOLIN 2 G: 1 INJECTION, POWDER, FOR SOLUTION INTRAMUSCULAR; INTRAVENOUS at 15:31

## 2025-07-07 RX ADMIN — ROCURONIUM BROMIDE 20 MG: 10 INJECTION, SOLUTION INTRAVENOUS at 13:18

## 2025-07-07 RX ADMIN — HYDRALAZINE HYDROCHLORIDE 10 MG: 20 INJECTION INTRAMUSCULAR; INTRAVENOUS at 21:27

## 2025-07-07 RX ADMIN — Medication 50 MCG: at 14:51

## 2025-07-07 RX ADMIN — PROPOFOL 50 MG: 10 INJECTION, EMULSION INTRAVENOUS at 11:40

## 2025-07-07 RX ADMIN — HYDROMORPHONE HYDROCHLORIDE 0.4 MG: 1 INJECTION, SOLUTION INTRAMUSCULAR; INTRAVENOUS; SUBCUTANEOUS at 15:42

## 2025-07-07 RX ADMIN — SCOPOLAMINE 1 PATCH: 1.5 PATCH, EXTENDED RELEASE TRANSDERMAL at 21:17

## 2025-07-07 RX ADMIN — ROCURONIUM BROMIDE 20 MG: 10 INJECTION, SOLUTION INTRAVENOUS at 12:17

## 2025-07-07 RX ADMIN — ACETAMINOPHEN 975 MG: 325 TABLET ORAL at 21:17

## 2025-07-07 RX ADMIN — ROCURONIUM BROMIDE 20 MG: 10 INJECTION, SOLUTION INTRAVENOUS at 13:53

## 2025-07-07 RX ADMIN — CEFAZOLIN 2 G: 1 INJECTION, POWDER, FOR SOLUTION INTRAMUSCULAR; INTRAVENOUS at 11:31

## 2025-07-07 RX ADMIN — FENTANYL CITRATE 100 MCG: 50 INJECTION, SOLUTION INTRAMUSCULAR; INTRAVENOUS at 11:26

## 2025-07-07 RX ADMIN — CEFAZOLIN SODIUM 2 G: 2 SOLUTION INTRAVENOUS at 23:46

## 2025-07-07 RX ADMIN — Medication 50 MCG: at 12:31

## 2025-07-07 RX ADMIN — Medication 100 MCG: at 13:14

## 2025-07-07 RX ADMIN — LIDOCAINE HYDROCHLORIDE 100 MG: 20 INJECTION, SOLUTION EPIDURAL; INFILTRATION; INTRACAUDAL; PERINEURAL at 11:26

## 2025-07-07 RX ADMIN — VANCOMYCIN HYDROCHLORIDE 1 G: 1 INJECTION, SOLUTION INTRAVENOUS at 11:37

## 2025-07-07 RX ADMIN — GLYCOPYRROLATE 0.2 MG: 0.2 INJECTION, SOLUTION INTRAMUSCULAR; INTRAVENOUS at 14:18

## 2025-07-07 RX ADMIN — Medication 200 MCG: at 15:07

## 2025-07-07 RX ADMIN — PHENYLEPHRINE-NACL IV SOLUTION 10 MG/250ML-0.9% 0.2 MCG/KG/MIN: 10-0.9/25 SOLUTION at 14:57

## 2025-07-07 RX ADMIN — ROCURONIUM BROMIDE 60 MG: 10 INJECTION, SOLUTION INTRAVENOUS at 11:27

## 2025-07-07 RX ADMIN — GENTAMICIN SULFATE 80 MG: 40 INJECTION, SOLUTION INTRAMUSCULAR; INTRAVENOUS at 11:44

## 2025-07-07 RX ADMIN — HYDROMORPHONE HYDROCHLORIDE 0.2 MG: 1 INJECTION, SOLUTION INTRAMUSCULAR; INTRAVENOUS; SUBCUTANEOUS at 15:36

## 2025-07-07 RX ADMIN — ROCURONIUM BROMIDE 20 MG: 10 INJECTION, SOLUTION INTRAVENOUS at 12:44

## 2025-07-07 RX ADMIN — ROCURONIUM BROMIDE 20 MG: 10 INJECTION, SOLUTION INTRAVENOUS at 15:11

## 2025-07-07 RX ADMIN — DEXAMETHASONE SODIUM PHOSPHATE 8 MG: 4 INJECTION, SOLUTION INTRAMUSCULAR; INTRAVENOUS at 11:32

## 2025-07-07 RX ADMIN — Medication 50 MCG: at 14:22

## 2025-07-07 RX ADMIN — ONDANSETRON 4 MG: 2 INJECTION, SOLUTION INTRAMUSCULAR; INTRAVENOUS at 15:58

## 2025-07-07 RX ADMIN — ROCURONIUM BROMIDE 20 MG: 10 INJECTION, SOLUTION INTRAVENOUS at 11:52

## 2025-07-07 RX ADMIN — DEXMEDETOMIDINE HYDROCHLORIDE 0.2 MCG/KG/HR: 4 INJECTION, SOLUTION INTRAVENOUS at 12:13

## 2025-07-07 RX ADMIN — Medication 200 MCG: at 15:02

## 2025-07-07 SDOH — ECONOMIC STABILITY: INCOME INSECURITY: IN THE PAST 12 MONTHS HAS THE ELECTRIC, GAS, OIL, OR WATER COMPANY THREATENED TO SHUT OFF SERVICES IN YOUR HOME?: NO

## 2025-07-07 SDOH — SOCIAL STABILITY: SOCIAL INSECURITY
WITHIN THE LAST YEAR, HAVE YOU BEEN RAPED OR FORCED TO HAVE ANY KIND OF SEXUAL ACTIVITY BY YOUR PARTNER OR EX-PARTNER?: NO

## 2025-07-07 SDOH — SOCIAL STABILITY: SOCIAL INSECURITY: ARE THERE ANY APPARENT SIGNS OF INJURIES/BEHAVIORS THAT COULD BE RELATED TO ABUSE/NEGLECT?: NO

## 2025-07-07 SDOH — ECONOMIC STABILITY: HOUSING INSECURITY: IN THE LAST 12 MONTHS, WAS THERE A TIME WHEN YOU WERE NOT ABLE TO PAY THE MORTGAGE OR RENT ON TIME?: NO

## 2025-07-07 SDOH — SOCIAL STABILITY: SOCIAL INSECURITY
WITHIN THE LAST YEAR, HAVE YOU BEEN KICKED, HIT, SLAPPED, OR OTHERWISE PHYSICALLY HURT BY YOUR PARTNER OR EX-PARTNER?: NO

## 2025-07-07 SDOH — ECONOMIC STABILITY: HOUSING INSECURITY: AT ANY TIME IN THE PAST 12 MONTHS, WERE YOU HOMELESS OR LIVING IN A SHELTER (INCLUDING NOW)?: NO

## 2025-07-07 SDOH — ECONOMIC STABILITY: HOUSING INSECURITY: IN THE PAST 12 MONTHS, HOW MANY TIMES HAVE YOU MOVED WHERE YOU WERE LIVING?: 0

## 2025-07-07 SDOH — SOCIAL STABILITY: SOCIAL INSECURITY: WITHIN THE LAST YEAR, HAVE YOU BEEN AFRAID OF YOUR PARTNER OR EX-PARTNER?: NO

## 2025-07-07 SDOH — SOCIAL STABILITY: SOCIAL INSECURITY: HAS ANYONE EVER THREATENED TO HURT YOUR FAMILY OR YOUR PETS?: NO

## 2025-07-07 SDOH — ECONOMIC STABILITY: FOOD INSECURITY: WITHIN THE PAST 12 MONTHS, THE FOOD YOU BOUGHT JUST DIDN'T LAST AND YOU DIDN'T HAVE MONEY TO GET MORE.: NEVER TRUE

## 2025-07-07 SDOH — SOCIAL STABILITY: SOCIAL INSECURITY: WITHIN THE LAST YEAR, HAVE YOU BEEN HUMILIATED OR EMOTIONALLY ABUSED IN OTHER WAYS BY YOUR PARTNER OR EX-PARTNER?: NO

## 2025-07-07 SDOH — SOCIAL STABILITY: SOCIAL INSECURITY: HAVE YOU HAD THOUGHTS OF HARMING ANYONE ELSE?: NO

## 2025-07-07 SDOH — ECONOMIC STABILITY: FOOD INSECURITY: WITHIN THE PAST 12 MONTHS, YOU WORRIED THAT YOUR FOOD WOULD RUN OUT BEFORE YOU GOT THE MONEY TO BUY MORE.: NEVER TRUE

## 2025-07-07 SDOH — ECONOMIC STABILITY: FOOD INSECURITY: HOW HARD IS IT FOR YOU TO PAY FOR THE VERY BASICS LIKE FOOD, HOUSING, MEDICAL CARE, AND HEATING?: NOT HARD AT ALL

## 2025-07-07 SDOH — SOCIAL STABILITY: SOCIAL INSECURITY: DO YOU FEEL ANYONE HAS EXPLOITED OR TAKEN ADVANTAGE OF YOU FINANCIALLY OR OF YOUR PERSONAL PROPERTY?: NO

## 2025-07-07 SDOH — SOCIAL STABILITY: SOCIAL INSECURITY: DO YOU FEEL UNSAFE GOING BACK TO THE PLACE WHERE YOU ARE LIVING?: NO

## 2025-07-07 SDOH — SOCIAL STABILITY: SOCIAL INSECURITY: DOES ANYONE TRY TO KEEP YOU FROM HAVING/CONTACTING OTHER FRIENDS OR DOING THINGS OUTSIDE YOUR HOME?: NO

## 2025-07-07 SDOH — HEALTH STABILITY: MENTAL HEALTH: CURRENT SMOKER: 0

## 2025-07-07 SDOH — ECONOMIC STABILITY: TRANSPORTATION INSECURITY: IN THE PAST 12 MONTHS, HAS LACK OF TRANSPORTATION KEPT YOU FROM MEDICAL APPOINTMENTS OR FROM GETTING MEDICATIONS?: NO

## 2025-07-07 SDOH — SOCIAL STABILITY: SOCIAL INSECURITY: ARE YOU OR HAVE YOU BEEN THREATENED OR ABUSED PHYSICALLY, EMOTIONALLY, OR SEXUALLY BY ANYONE?: NO

## 2025-07-07 SDOH — SOCIAL STABILITY: SOCIAL INSECURITY: ABUSE: ADULT

## 2025-07-07 ASSESSMENT — PAIN SCALES - GENERAL
PAINLEVEL_OUTOF10: 0 - NO PAIN
PAINLEVEL_OUTOF10: 3
PAINLEVEL_OUTOF10: 0 - NO PAIN
PAINLEVEL_OUTOF10: 3
PAINLEVEL_OUTOF10: 0 - NO PAIN
PAINLEVEL_OUTOF10: 3
PAINLEVEL_OUTOF10: 0 - NO PAIN
PAINLEVEL_OUTOF10: 5 - MODERATE PAIN

## 2025-07-07 ASSESSMENT — LIFESTYLE VARIABLES
SKIP TO QUESTIONS 9-10: 1
AUDIT-C TOTAL SCORE: 4
HOW OFTEN DO YOU HAVE A DRINK CONTAINING ALCOHOL: 4 OR MORE TIMES A WEEK
HOW MANY STANDARD DRINKS CONTAINING ALCOHOL DO YOU HAVE ON A TYPICAL DAY: 1 OR 2
AUDIT-C TOTAL SCORE: 4
HOW OFTEN DO YOU HAVE 6 OR MORE DRINKS ON ONE OCCASION: NEVER

## 2025-07-07 ASSESSMENT — ACTIVITIES OF DAILY LIVING (ADL)
HEARING - RIGHT EAR: FUNCTIONAL
BATHING: INDEPENDENT
HEARING - LEFT EAR: FUNCTIONAL
FEEDING YOURSELF: INDEPENDENT
ADEQUATE_TO_COMPLETE_ADL: YES
TOILETING: NEEDS ASSISTANCE
GROOMING: INDEPENDENT
JUDGMENT_ADEQUATE_SAFELY_COMPLETE_DAILY_ACTIVITIES: YES
ASSISTIVE_DEVICE: WALKER
LACK_OF_TRANSPORTATION: NO
PATIENT'S MEMORY ADEQUATE TO SAFELY COMPLETE DAILY ACTIVITIES?: YES
LACK_OF_TRANSPORTATION: NO
DRESSING YOURSELF: NEEDS ASSISTANCE
WALKS IN HOME: NEEDS ASSISTANCE

## 2025-07-07 ASSESSMENT — COLUMBIA-SUICIDE SEVERITY RATING SCALE - C-SSRS
6. HAVE YOU EVER DONE ANYTHING, STARTED TO DO ANYTHING, OR PREPARED TO DO ANYTHING TO END YOUR LIFE?: NO
2. HAVE YOU ACTUALLY HAD ANY THOUGHTS OF KILLING YOURSELF?: NO
1. IN THE PAST MONTH, HAVE YOU WISHED YOU WERE DEAD OR WISHED YOU COULD GO TO SLEEP AND NOT WAKE UP?: NO

## 2025-07-07 ASSESSMENT — PAIN - FUNCTIONAL ASSESSMENT
PAIN_FUNCTIONAL_ASSESSMENT: 0-10

## 2025-07-07 ASSESSMENT — COGNITIVE AND FUNCTIONAL STATUS - GENERAL
DRESSING REGULAR UPPER BODY CLOTHING: A LITTLE
MOVING FROM LYING ON BACK TO SITTING ON SIDE OF FLAT BED WITH BEDRAILS: A LOT
PERSONAL GROOMING: A LITTLE
WALKING IN HOSPITAL ROOM: A LOT
TURNING FROM BACK TO SIDE WHILE IN FLAT BAD: A LOT
DAILY ACTIVITIY SCORE: 16
DRESSING REGULAR LOWER BODY CLOTHING: A LOT
WALKING IN HOSPITAL ROOM: A LOT
MOVING FROM LYING ON BACK TO SITTING ON SIDE OF FLAT BED WITH BEDRAILS: A LOT
STANDING UP FROM CHAIR USING ARMS: A LOT
TOILETING: A LITTLE
DAILY ACTIVITIY SCORE: 17
HELP NEEDED FOR BATHING: A LITTLE
TURNING FROM BACK TO SIDE WHILE IN FLAT BAD: A LOT
PERSONAL GROOMING: A LITTLE
TOILETING: A LOT
HELP NEEDED FOR BATHING: A LITTLE
DRESSING REGULAR LOWER BODY CLOTHING: A LOT
CLIMB 3 TO 5 STEPS WITH RAILING: A LOT
EATING MEALS: A LITTLE
PATIENT BASELINE BEDBOUND: NO
MOBILITY SCORE: 12
EATING MEALS: A LITTLE
DRESSING REGULAR UPPER BODY CLOTHING: A LITTLE
MOVING TO AND FROM BED TO CHAIR: A LOT
STANDING UP FROM CHAIR USING ARMS: A LOT
CLIMB 3 TO 5 STEPS WITH RAILING: A LOT

## 2025-07-07 ASSESSMENT — ENCOUNTER SYMPTOMS
ENDOCRINE NEGATIVE: 1
NUMBNESS: 1
WEAKNESS: 1
DISTURBANCES IN COORDINATION: 1
PSYCHIATRIC NEGATIVE: 1
GASTROINTESTINAL NEGATIVE: 1
CARDIOVASCULAR NEGATIVE: 1
RESPIRATORY NEGATIVE: 1
CONSTITUTIONAL NEGATIVE: 1
HEMATOLOGIC/LYMPHATIC NEGATIVE: 1
EYES NEGATIVE: 1

## 2025-07-07 ASSESSMENT — PAIN DESCRIPTION - DESCRIPTORS: DESCRIPTORS: ACHING

## 2025-07-07 ASSESSMENT — PAIN SCALES - WONG BAKER
WONGBAKER_NUMERICALRESPONSE: NO HURT

## 2025-07-07 NOTE — H&P
History Of Present Illness  Edilson Cantu is a 74 y.o. male presenting with a medical history significant for Parkinson syndrome who continues to struggle with progressive difficulty with balance, coordination, bilateral hand abrasion with numbness and tingling.  Recently had carpal tunnel surgery done in his left hand for numbness with no significant change.  After this, MRI of cervical spine demonstrated Nurick IV pathic patient who is dependent on a walker for ambulation.  He is also experiencing increasing difficulties with dexterity such as buttoning his shirts and dropping things randomly.  This has been slowly worsening over the past 2 years..     Past Medical History  He has a past medical history of Bronchitis, Parkinsons (Multi), and Scoliosis.    Surgical History  He has a past surgical history that includes Knee arthroscopy w/ debridement (08/24/2017); Other surgical history (09/10/2019); Knee Arthroplasty (Right); and Carpal tunnel release (Left).     Social History  He reports that he has never smoked. He has never been exposed to tobacco smoke. He has never used smokeless tobacco. He reports current alcohol use. He reports that he does not use drugs.    Family History  Family History[1]     Allergies  Patient has no known allergies.    Review of Systems   Constitutional: Negative.    HENT: Negative.     Eyes: Negative.    Respiratory: Negative.     Cardiovascular: Negative.    Gastrointestinal: Negative.    Endocrine: Negative.    Musculoskeletal:  Positive for muscle weakness.   Skin: Negative.    Neurological:  Positive for weakness, numbness and disturbances in coordination.   Hematological: Negative.    Psychiatric/Behavioral: Negative.        Review of Systems   Constitutional: Negative.   HENT: Negative.     Eyes: Negative.    Cardiovascular: Negative.    Respiratory: Negative.     Endocrine: Negative.    Hematologic/Lymphatic: Negative.    Skin: Negative.    Musculoskeletal:  Positive for  "muscle weakness.   Gastrointestinal: Negative.    Neurological:  Positive for disturbances in coordination, numbness and weakness.   Psychiatric/Behavioral: Negative.         Physical Exam   MSK: No pain range of motion of the shoulders bilaterally; no signs of impingement  General: No acute distress. Awake and conversant.  Eyes: Normal conjunctiva, anicteric. Round symmetric pupils.  ENT: Hearing grossly intact. No nasal discharge.  Neck: Neck is supple. No masses or thyromegaly.  Respiratory: Respirations are non-labored. No wheezing.  Skin: Warm. No rashes or ulcers.  Psych: Alert and oriented. Cooperative, appropriate mood and affect, normal judgement.  CV: No lower extremity edema.  Neuro: 4 -/5 strength in upper extremities bilaterally; negative Kenan's; no hyperreflexia    Last Recorded Vitals  Blood pressure 162/83, pulse 62, temperature 36.5 °C (97.7 °F), temperature source Temporal, resp. rate 16, height 1.778 m (5' 10\"), weight 67.6 kg (149 lb 0.5 oz), SpO2 98%.    Relevant Results      Scheduled medications  Scheduled Medications[2]  Continuous medications  Continuous Medications[3]  PRN medications  PRN Medications[4]  Results for orders placed or performed during the hospital encounter of 07/07/25 (from the past 24 hours)   CBC   Result Value Ref Range    WBC 4.5 4.4 - 11.3 x10*3/uL    nRBC 0.0 0.0 - 0.0 /100 WBCs    RBC 4.65 4.50 - 5.90 x10*6/uL    Hemoglobin 14.1 13.5 - 17.5 g/dL    Hematocrit 43.5 41.0 - 52.0 %    MCV 94 80 - 100 fL    MCH 30.3 26.0 - 34.0 pg    MCHC 32.4 32.0 - 36.0 g/dL    RDW 13.2 11.5 - 14.5 %    Platelets 222 150 - 450 x10*3/uL   Comprehensive Metabolic Panel   Result Value Ref Range    Glucose 83 74 - 99 mg/dL    Sodium 140 136 - 145 mmol/L    Potassium 4.3 3.5 - 5.3 mmol/L    Chloride 107 98 - 107 mmol/L    Bicarbonate 23 21 - 32 mmol/L    Anion Gap 14 10 - 20 mmol/L    Urea Nitrogen 18 6 - 23 mg/dL    Creatinine 0.80 0.50 - 1.30 mg/dL    eGFR >90 >60 mL/min/1.73m*2    " Calcium 9.6 8.6 - 10.3 mg/dL    Albumin 4.4 3.4 - 5.0 g/dL    Alkaline Phosphatase 106 33 - 136 U/L    Total Protein 7.1 6.4 - 8.2 g/dL    AST 14 9 - 39 U/L    Bilirubin, Total 0.8 0.0 - 1.2 mg/dL    ALT 10 10 - 52 U/L     *Note: Due to a large number of results and/or encounters for the requested time period, some results have not been displayed. A complete set of results can be found in Results Review.       Assessment/Plan   Assessment & Plan  Spinal stenosis in cervical region    Cervical spondylosis with myelopathy    Spinal cord compression (Multi)      Cervical myelopathy  -C3-C6 cervical spine corpectomy; C2-C7 fusion with allograft and anterior instrumentation; C2-T2 posterior instrumentation with Dr. Arteaga today 7/7/2025  -Expect inpatient stay for 2 days after surgery  -Patient will plan to be discharged home with home health care later this week  -Maintain cervical collar  -Remove ERICA drains before discharge  -Ambulate as tolerated with PT/OT on the floor       I spent 30 minutes in the professional and overall care of this patient.      Benny Garcia PA-C  10:47 AM  07/07/25         [1]   Family History  Problem Relation Name Age of Onset    Other (Cardiac disorder) Father     [2] [3] [4]

## 2025-07-07 NOTE — ANESTHESIA POSTPROCEDURE EVALUATION
Patient: Edilson Cantu    Procedure Summary       Date: 07/07/25 Room / Location: [facility] A OR 07 / Virtual [facility] A OR    Anesthesia Start: 1114 Anesthesia Stop: 1639    Procedure: C3-C6 Cervical Spine Corpectomy; C2-C7 Cervical Fusion with Allograft & Anterior Instrumentation; C2-T2 Fusion with Instrumentation (Spine Cervical) Diagnosis:       Spinal stenosis in cervical region      Cervical spondylosis with myelopathy      Spinal cord compression (Multi)      (Spinal stenosis in cervical region [M48.02])      (Cervical spondylosis with myelopathy [M47.12])      (Spinal cord compression (Multi) [G95.20])    Surgeons: Ruben Arteaga MD Responsible Provider: Guero Aquino MD    Anesthesia Type: general ASA Status: 3            Anesthesia Type: general    Vitals Value Taken Time   /71 07/07/25 18:16   Temp 36.3 °C (97.3 °F) 07/07/25 17:30   Pulse 69 07/07/25 18:23   Resp 14 07/07/25 18:00   SpO2 100 % 07/07/25 18:23   Vitals shown include unfiled device data.    Anesthesia Post Evaluation    Patient participation: complete - patient participated  Level of consciousness: awake  Pain management: satisfactory to patient  Airway patency: patent  Cardiovascular status: acceptable and hemodynamically stable  Respiratory status: acceptable and nonlabored ventilation  Hydration status: balanced  Postoperative Nausea and Vomiting: none        No notable events documented.

## 2025-07-07 NOTE — ANESTHESIA PREPROCEDURE EVALUATION
Patient: dEilson Cantu    Procedure Information       Date/Time: 07/07/25 1015    Procedure: C3-C6 Cervical Spine Corpectomy; C2-C7 Cervical Fusion with Allograft & Anterior Instrumentation; C2-T2 Fusion with Instrumentation (Spine Cervical) - CPT:63082 x 3, 22585 x 4    Location: [facility] A OR 07 / [facility] A OR    Surgeons: Ruben Arteaga MD            Relevant Problems   Anesthesia (within normal limits)      Cardiac   (+) HTN (hypertension)      Pulmonary (within normal limits)      Neuro   (+) Depression      GI (within normal limits)      /Renal   (+) BPH (benign prostatic hyperplasia)      Liver (within normal limits)      Endocrine (within normal limits)      Hematology (within normal limits)      Musculoskeletal   (+) Knee osteoarthritis   (+) Osteoarthritis of right knee, unspecified osteoarthritis type   (+) Spinal stenosis in cervical region   (+) Spinal stenosis, lumbosacral region      HEENT (within normal limits)      ID (within normal limits)      Skin (within normal limits)       Clinical information reviewed:    Allergies                NPO Detail:  NPO/Void Status  Carbohydrate Drink Given Prior to Surgery? : N  Date of Last Liquid: 07/07/25  Time of Last Liquid: 0300  Date of Last Solid: 07/06/25  Time of Last Solid: 2000  Last Intake Type: Clear fluids  Time of Last Void: 0600         Physical Exam    Airway  Mallampati: II  TM distance: >3 FB  Neck ROM: limited  Mouth opening: 3 or more finger widths     Cardiovascular - normal exam   Dental    Pulmonary - normal exam   Abdominal - normal exam           Anesthesia Plan    History of general anesthesia?: yes  History of complications of general anesthesia?: no    ASA 3     general     The patient is not a current smoker.    intravenous induction   Anesthetic plan and risks discussed with patient.  Use of blood products discussed with patient who consented to blood products.

## 2025-07-07 NOTE — OP NOTE
C3-C6 Cervical Spine Corpectomy; C2-C7 Cervical Fusion with Allograft & Anterior Instrumentation; C2-T2 Fusion with Instrumentation Operative Note     Date: 2025  OR Location: [facility] OR    Name: Edilson Cantu, : 1950, Age: 74 y.o., MRN: [id number], Sex: male    Diagnosis  Pre-op Diagnosis      * Spinal stenosis in cervical region [M48.02]     * Cervical spondylosis with myelopathy [M47.12]     * Spinal cord compression (Multi) [G95.20] Post-op Diagnosis     * Spinal stenosis in cervical region [M48.02]     * Cervical spondylosis with myelopathy [M47.12]     * Spinal cord compression (Multi) [G95.20]     Procedures  C3, C4, C5, C6 corpectomy; C2-C7 anterior cervical fusion with fibular strut allograft, and Atlantis anterior cervical instrumentation; C2-T2 cervical thoracic fusion with Medtronic Infinity instrumentation, allograft chips and demineralized bone matrix; application movable Chu-Unype tongs; application movable of Mills tongs    Surgeons      * Ruben Arteaga - Primary    Resident/Fellow/Other Assistant:  Surgeons and Role:     * Benny Garcia PA-C - LORRI First Assist    Staff:   Circulator: Isi Mcgraw Person: Eric  Relief Scrub: Adrienne Agosto Circulator: Nigel Agosot Scrub: Bridgett Agosto Circulator: Blanca    Anesthesia Staff: Anesthesiologist: Connor Cline MD; Caden Sapp MD  C-AA: ISAURA Mandel; ISAURA Garcia  SRNA: Laxmi Mandel    Procedure Summary  Anesthesia: General  ASA: III  Estimated Blood Loss: 100 mL  Intra-op Medications:   Administrations occurring from 1015 to 1545 on 25:   Medication Name Total Dose   thrombin-bovine (JMI) 5,000 unit topical solution 10,000 Units   sodium chloride 0.9 % irrigation solution 1,000 mL   ceFAZolin (Ancef) vial 1 g 4 g   dexAMETHasone (Decadron) 4 mg/mL IV Syringe 2 mL 8 mg   dexmedeTOMIDine 4 mcg/mL in 100 mL NS infusion 47.09 mcg   dexmedeTOMIDine (Precedex) 100 mcg/mL 2 mL single dose vial 8  mcg   esmolol (Brevibloc) injection 20 mg   fentaNYL (Sublimaze) injection 50 mcg/mL 100 mcg   gentamicin (Garamycin) injection 40 mg/mL 80 mg   glycopyrrolate PF (Robinul) injection 0.2 mg   HYDROmorphone (Dilaudid) injection 1 mg/mL 1 mg   ketorolac (Toradol) injection 30 mg 15 mg   lactated Ringer's infusion Cannot be calculated   lidocaine PF (Xylocaine-MPF) local injection 2 % 100 mg   lubricating eye drops ophthalmic solution 2 drop   phenylephrine (Zeyad-Synephrine) 10 mg/250 mL NS (40 mcg/mL) infusion 1.58 mg   phenylephrine 100 mcg/mL syringe 10 mL (prefilled) 850 mcg   propofol (Diprivan) injection 10 mg/mL 200 mg   rocuronium (ZeMuron) 50 mg/5 mL injection 200 mg   NaCl 0.9 % bolus Cannot be calculated   vancomycin IV 1 g in 200 mL D5W - premix 1 g              Anesthesia Record               Intraprocedure I/O Totals          Intake    Dexmedetomidine 0.00 mL    The total shown is the total volume documented since Anesthesia Start was filed.    NaCl 0.9 % bolus 100.00 mL    Phenylephrine Drip 0.00 mL    The total shown is the total volume documented since Anesthesia Start was filed.    lactated Ringer's 1200.00 mL    vancomycin 1 gram/200 mL 200.00 mL    Total Intake 1500 mL       Output    Est. Blood Loss 100 mL    Total Output 100 mL       Net    Net Volume 1400 mL          Specimen: No specimens collected              Drains and/or Catheters:   Closed/Suction Drain Anterior Neck 7 Fr. (Active)       Tourniquet Times:         Implants:  Implants       Type Name Action Serial No.      Graft BONE, FIBULA, SHAFT, 10 CM X 12-13 MM, FREEZE-DRIED - [id number] - [id number] Implanted [id number]     Spinal Hardware PUTTY, ORTHOBLAST II 10ML - [id number] - [id number] Implanted [id number]     Graft BONE CHIPS, CANCELL 30CC 1-4MM - [id number] - [id number] Implanted [id number]     Screw MULTIAXIAL SCREW Implanted N/A     Spinal Hardware PLATE, 72.5MM VISION ELITE - SNA - [id number] Implanted NA     Spinal  Hardware SCREW, CERV 4.0 X 15 SD VA - SNA - [id number] Implanted NA     Screw 4.5x24mm SCREW Implanted NA     Screw SCREW, INFINITY, MULTI AXIAL, 3.5 X 14 - SNA - [id number] Implanted NA     Screw SET SCREW Implanted NA     Spinal Hardware TAMI, VERTEX 3.5 X 240 TI - SNA - [id number] Implanted NA                  Indications: Edilson Cantu is an 74 y.o. male who is having surgery for Spinal stenosis in cervical region [M48.02]  Cervical spondylosis with myelopathy [M47.12]  Spinal cord compression (Multi) [G95.20].     The patient was seen in the preoperative area. The risks, benefits, complications, treatment options, non-operative alternatives, expected recovery and outcomes were discussed with the patient. The possibilities of reaction to medication, pulmonary aspiration, injury to surrounding structures, bleeding, recurrent infection, the need for additional procedures, failure to diagnose a condition, and creating a complication requiring transfusion or operation were discussed with the patient. The patient concurred with the proposed plan, giving informed consent.  The site of surgery was properly noted/marked if necessary per policy. The patient has been actively warmed in preoperative area. Preoperative antibiotics have been ordered and given within 1 hours of incision. Venous thrombosis prophylaxis have been ordered including bilateral sequential compression devices    Procedure Details: Patient was taken to the operating room where a formal timeout was done according to hospital protocol.  Prior to proceeding with the procedure, we did have a discussion with Edilson about the skin around his neck.  There was evidence of a rash with a small pustular component both anterior and posteriorly.  Patient claims that the rash has been there for over a month.  We explained that there might be an increased risk for infection if we proceeded with the procedure.  Patient understood this and was adamant about proceeding  with the surgery given the progression of his neurologic deterioration.  Patient was then intubated without difficulty and given the appropriate preoperative antibiotics to cover skin yolanda.    Patient was then placed supine on the OR table.  Neck was gently extended.  Was prepped and draped in usual fashion.  We did a standard left-sided approach to the cervical spine.  We got an x-ray to confirm the level.  We placed retractors.  We then did a complete corpectomy of C3-C4 C5-C6.  We took down the posterior longitudinal ligament.  There was a large posterior disc herniation at C2-C3.  This was removed.  We then placed slight axial traction through the Chu-Wells tongs was replaced at the beginning of the case and cut a fibular strut allograft to the appropriate length.  The fibular strut allograft was then tapped into the appropriate position from C2-C7.  It had a good snug fit.  We then applied an anterior cervical plate from C2-C7.  Screws had acceptable purchase.  X-ray confirmed satisfactory placement.  Floseal was placed around the implant.  We closed over 7 flat ERICA drain brought through separate stab incision skin.  Wound was closed in the usual fashion.  Sterile dressing was applied.  Chu-Wells tongs were removed.  Mills tongs were then placed.  They were attached to the Benny table.    Patient was then rolled in the prone position.  Again we meticulously cleansed the skin over the posterior neck.  We then did a standard midline incision.  We exposed the posterior elements from C2-T2.  Once we had sufficient exposure we placed C2 pars screws, lateral mass screws from C3-C7, and then bilateral T1 and T2 pedicle screws.  Screws had acceptable purchase.  Rods were then cut and contoured the appropriate sagittal alignment.  Screws were tightened to  specification.  We then copiously irrigated with Betadine solution as well as Irrisept and pulsatile lavage.  We then decorticated the  posterior elements and packed and allograft chips and demineralized bone matrix.  Antibiotic powder was then placed in the subfascial wound.  We closed over 7 flat ERICA drain brought through separate stab incision the skin.  Wound was closed in layers in usual fashion.  Sterile dressing was applied.  Patient was flipped onto the postoperative bed.  Tongs were removed.  He was placed in a soft cervical collar.    I was present and scrubbed for the entire procedure.  The physician assistant was present, scrubbed and participated in all portions of the procedure, as no qualified surgeon physician and/or resident surgeon was available to assist in the case.      Ruben Arteaga MD    Chief of Spine Surgery, [facility]  Director of Spine Service, [facility]  , Department of Orthopaedics  [facility]  [id number] [place] [address]  P: [phone]    This note was dictated with voice recognition software.  It has not been proofread for grammatical errors, typographical mistakes or other semantic inconsistencies.      Evidence of Infection: No   Complications:  None; patient tolerated the procedure well.    Disposition: PACU - hemodynamically stable.  Condition: stable                 Attending Attestation:     Ruben Arteaga  Phone Number: [phone]

## 2025-07-07 NOTE — ANESTHESIA PROCEDURE NOTES
Airway  Date/Time: 7/7/2025 11:29 AM  Reason: elective    Airway not difficult    Staffing  Performed: FABRICIO   Authorized by: Connor Cline MD    Performed by: Laxmi Mandel  Patient location during procedure: OR    Patient Condition  Indications for airway management: anesthesia  Patient position: sniffing  MILS maintained throughout  Planned trial extubation  Sedation level: deep     Final Airway Details   Preoxygenated: yes  Final airway type: endotracheal airway  Successful airway: ETT  Cuffed: yes   Successful intubation technique: video laryngoscopy  Adjuncts used in placement: intubating stylet  Endotracheal tube insertion site: oral  Blade: Jimmy  Blade size: #4  ETT size (mm): 7.5  Cormack-Lehane Classification: grade I - full view of glottis  Placement verified by: capnometry and palpation of cuff   Measured from: lips  ETT to lips (cm): 22  Number of attempts at approach: 1  Number of other approaches attempted: 0    Additional Comments  Glidescope used, lips and teeth in pre anesthetic condition.

## 2025-07-07 NOTE — ANESTHESIA PROCEDURE NOTES
Peripheral IV  Date/Time: 7/7/2025 11:35 AM  Inserted by: Laxmi Mandel    Placement  Needle size: 18 G  Laterality: left  Location: forearm  Local anesthetic: none  Site prep: alcohol  Technique: anatomical landmarks  Attempts: 1

## 2025-07-08 LAB
ANION GAP SERPL CALC-SCNC: 11 MMOL/L (ref 10–20)
BUN SERPL-MCNC: 17 MG/DL (ref 6–23)
CALCIUM SERPL-MCNC: 9 MG/DL (ref 8.6–10.3)
CHLORIDE SERPL-SCNC: 108 MMOL/L (ref 98–107)
CO2 SERPL-SCNC: 24 MMOL/L (ref 21–32)
CREAT SERPL-MCNC: 0.87 MG/DL (ref 0.5–1.3)
EGFRCR SERPLBLD CKD-EPI 2021: >90 ML/MIN/1.73M*2
ERYTHROCYTE [DISTWIDTH] IN BLOOD BY AUTOMATED COUNT: 13.2 % (ref 11.5–14.5)
GLUCOSE BLD MANUAL STRIP-MCNC: 129 MG/DL (ref 74–99)
GLUCOSE BLD MANUAL STRIP-MCNC: 132 MG/DL (ref 74–99)
GLUCOSE BLD MANUAL STRIP-MCNC: 144 MG/DL (ref 74–99)
GLUCOSE BLD MANUAL STRIP-MCNC: 190 MG/DL (ref 74–99)
GLUCOSE SERPL-MCNC: 129 MG/DL (ref 74–99)
HCT VFR BLD AUTO: 35.5 % (ref 41–52)
HGB BLD-MCNC: 11.7 G/DL (ref 13.5–17.5)
MCH RBC QN AUTO: 30.2 PG (ref 26–34)
MCHC RBC AUTO-ENTMCNC: 33 G/DL (ref 32–36)
MCV RBC AUTO: 92 FL (ref 80–100)
NRBC BLD-RTO: 0 /100 WBCS (ref 0–0)
PLATELET # BLD AUTO: 213 X10*3/UL (ref 150–450)
POTASSIUM SERPL-SCNC: 4.4 MMOL/L (ref 3.5–5.3)
RBC # BLD AUTO: 3.87 X10*6/UL (ref 4.5–5.9)
SODIUM SERPL-SCNC: 139 MMOL/L (ref 136–145)
WBC # BLD AUTO: 9.1 X10*3/UL (ref 4.4–11.3)

## 2025-07-08 PROCEDURE — 80048 BASIC METABOLIC PNL TOTAL CA: CPT

## 2025-07-08 PROCEDURE — 97530 THERAPEUTIC ACTIVITIES: CPT | Mod: GP

## 2025-07-08 PROCEDURE — 1100000001 HC PRIVATE ROOM DAILY

## 2025-07-08 PROCEDURE — 97535 SELF CARE MNGMENT TRAINING: CPT | Mod: GO

## 2025-07-08 PROCEDURE — 2500000002 HC RX 250 W HCPCS SELF ADMINISTERED DRUGS (ALT 637 FOR MEDICARE OP, ALT 636 FOR OP/ED)

## 2025-07-08 PROCEDURE — 97162 PT EVAL MOD COMPLEX 30 MIN: CPT | Mod: GP

## 2025-07-08 PROCEDURE — 51701 INSERT BLADDER CATHETER: CPT

## 2025-07-08 PROCEDURE — 2500000004 HC RX 250 GENERAL PHARMACY W/ HCPCS (ALT 636 FOR OP/ED): Performed by: INTERNAL MEDICINE

## 2025-07-08 PROCEDURE — 97165 OT EVAL LOW COMPLEX 30 MIN: CPT | Mod: GO

## 2025-07-08 PROCEDURE — 97116 GAIT TRAINING THERAPY: CPT | Mod: GP

## 2025-07-08 PROCEDURE — 2500000004 HC RX 250 GENERAL PHARMACY W/ HCPCS (ALT 636 FOR OP/ED)

## 2025-07-08 PROCEDURE — 2500000001 HC RX 250 WO HCPCS SELF ADMINISTERED DRUGS (ALT 637 FOR MEDICARE OP)

## 2025-07-08 PROCEDURE — 36415 COLL VENOUS BLD VENIPUNCTURE: CPT

## 2025-07-08 PROCEDURE — 85027 COMPLETE CBC AUTOMATED: CPT

## 2025-07-08 PROCEDURE — 82947 ASSAY GLUCOSE BLOOD QUANT: CPT

## 2025-07-08 RX ORDER — OLANZAPINE 10 MG/2ML
5 INJECTION, POWDER, FOR SOLUTION INTRAMUSCULAR ONCE
Status: COMPLETED | OUTPATIENT
Start: 2025-07-08 | End: 2025-07-08

## 2025-07-08 RX ADMIN — CARBIDOPA AND LEVODOPA 2 TABLET: 25; 100 TABLET ORAL at 08:23

## 2025-07-08 RX ADMIN — ACETAMINOPHEN 975 MG: 325 TABLET ORAL at 21:51

## 2025-07-08 RX ADMIN — DEXAMETHASONE SODIUM PHOSPHATE 10 MG: 10 INJECTION, SOLUTION INTRAMUSCULAR; INTRAVENOUS at 06:56

## 2025-07-08 RX ADMIN — CEFAZOLIN SODIUM 2 G: 2 SOLUTION INTRAVENOUS at 06:56

## 2025-07-08 RX ADMIN — ACETAMINOPHEN 975 MG: 325 TABLET ORAL at 06:56

## 2025-07-08 RX ADMIN — ACETAMINOPHEN 975 MG: 325 TABLET ORAL at 14:41

## 2025-07-08 RX ADMIN — OLANZAPINE 5 MG: 10 INJECTION, POWDER, FOR SOLUTION INTRAMUSCULAR at 23:09

## 2025-07-08 RX ADMIN — POLYETHYLENE GLYCOL 3350 17 G: 17 POWDER, FOR SOLUTION ORAL at 08:23

## 2025-07-08 RX ADMIN — PSYLLIUM HUSK 1 PACKET: 3.4 POWDER ORAL at 08:23

## 2025-07-08 RX ADMIN — CARBIDOPA AND LEVODOPA 2 TABLET: 25; 100 TABLET ORAL at 12:05

## 2025-07-08 RX ADMIN — CARBIDOPA AND LEVODOPA 2 TABLET: 25; 100 TABLET ORAL at 18:43

## 2025-07-08 RX ADMIN — INSULIN LISPRO 1 UNITS: 100 INJECTION, SOLUTION INTRAVENOUS; SUBCUTANEOUS at 12:08

## 2025-07-08 ASSESSMENT — COGNITIVE AND FUNCTIONAL STATUS - GENERAL
HELP NEEDED FOR BATHING: A LOT
WALKING IN HOSPITAL ROOM: TOTAL
MOVING FROM LYING ON BACK TO SITTING ON SIDE OF FLAT BED WITH BEDRAILS: A LOT
EATING MEALS: A LITTLE
CLIMB 3 TO 5 STEPS WITH RAILING: TOTAL
TOILETING: A LOT
DRESSING REGULAR LOWER BODY CLOTHING: A LITTLE
MOVING TO AND FROM BED TO CHAIR: A LOT
PERSONAL GROOMING: A LOT
MOBILITY SCORE: 12
DRESSING REGULAR LOWER BODY CLOTHING: TOTAL
MOVING FROM LYING ON BACK TO SITTING ON SIDE OF FLAT BED WITH BEDRAILS: A LOT
TOILETING: A LOT
DAILY ACTIVITIY SCORE: 12
DRESSING REGULAR UPPER BODY CLOTHING: A LOT
TURNING FROM BACK TO SIDE WHILE IN FLAT BAD: A LOT
STANDING UP FROM CHAIR USING ARMS: A LOT
CLIMB 3 TO 5 STEPS WITH RAILING: A LOT
MOVING TO AND FROM BED TO CHAIR: A LOT
DRESSING REGULAR UPPER BODY CLOTHING: A LITTLE
STANDING UP FROM CHAIR USING ARMS: A LOT
MOBILITY SCORE: 10
WALKING IN HOSPITAL ROOM: A LOT
PERSONAL GROOMING: A LITTLE
DAILY ACTIVITIY SCORE: 18
TURNING FROM BACK TO SIDE WHILE IN FLAT BAD: A LOT
HELP NEEDED FOR BATHING: A LITTLE

## 2025-07-08 ASSESSMENT — PAIN SCALES - GENERAL
PAINLEVEL_OUTOF10: 0 - NO PAIN
PAINLEVEL_OUTOF10: 6
PAINLEVEL_OUTOF10: 4
PAINLEVEL_OUTOF10: 2
PAINLEVEL_OUTOF10: 4
PAINLEVEL_OUTOF10: 5 - MODERATE PAIN
PAINLEVEL_OUTOF10: 3
PAINLEVEL_OUTOF10: 5 - MODERATE PAIN

## 2025-07-08 ASSESSMENT — PAIN DESCRIPTION - DESCRIPTORS
DESCRIPTORS: SORE
DESCRIPTORS: ACHING
DESCRIPTORS: SORE
DESCRIPTORS: DISCOMFORT;SORE

## 2025-07-08 ASSESSMENT — PAIN - FUNCTIONAL ASSESSMENT
PAIN_FUNCTIONAL_ASSESSMENT: 0-10

## 2025-07-08 ASSESSMENT — ACTIVITIES OF DAILY LIVING (ADL)
ADL_ASSISTANCE: NEEDS ASSISTANCE
ADL_ASSISTANCE: NEEDS ASSISTANCE
HOME_MANAGEMENT_TIME_ENTRY: 17
LACK_OF_TRANSPORTATION: NO

## 2025-07-08 NOTE — PROGRESS NOTES
Occupational Therapy    Evaluation/Treatment    Patient Name: Edilson Cantu  MRN: [id number]  Department: [place] [id number]  Room: [address] [place] [address]  Today's Date: 07/08/25  Time Calculation  Start Time: 1007  Stop Time: 1034  Time Calculation (min): 27 min       Assessment:  OT Assessment: 74 y.o. M admitted for cervical spondylosis with myelopathy s/p C3, C4, C5, C6 corpectomy; C2-C7 anterior cervical fusion with fibular strut allograft, and Atlantis anterior cervical instrumentation; C2-T2 cervical thoracic fusion with Medtronic Infinity instrumentation, allograft chips and demineralized bone matrix;  Prognosis: Good  Barriers to Discharge Home: Caregiver assistance, Physical needs, Cognition needs  Caregiver Assistance: Caregiver assistance needed per identified barriers - however, level of patient's required assistance exceeds assistance available at home  Cognition Needs: 24hr supervision for safety awareness needed, Recollection or understanding of precautions/restrictions limited, Insight of patient limited regarding functional ability/needs  Physical Needs: Stair navigation into home limited by function/safety, 24hr mobility assistance needed, 24hr ADL assistance needed, High falls risk due to function or environment  Evaluation/Treatment Tolerance: Patient limited by fatigue  Medical Staff Made Aware: Yes  End of Session Communication: Bedside nurse, PCT/NA/CTA, Care Coordinator  End of Session Patient Position: Up in chair, Alarm on  OT Assessment Results: Decreased ADL status, Decreased safe judgment during ADL, Decreased cognition, Decreased endurance, Decreased functional mobility  Prognosis: Good  Barriers to Discharge: Decreased caregiver support  Evaluation/Treatment Tolerance: Patient limited by fatigue  Medical Staff Made Aware: Yes  Strengths: Access to adaptive/assistive products, Attitude of self, Support and attitude of living partners  Barriers to Participation: Comorbidities, Insight into  problems  Plan:  Treatment Interventions: ADL retraining, Functional transfer training, UE strengthening/ROM, Endurance training  OT Frequency: 3 times per week (during this acute inpatient hospitalization.)  OT Discharge Recommendations: Moderate intensity level of continued care (Based on current functional status and rehab potential, patient is anticipated to tolerate and benefit from 5 or more days per week of skilled rehabilitative therapy after discharge from this acute inpatient hospitalization.)  Equipment Recommended upon Discharge: Other (comment) (TBD)  OT Recommended Transfer Status: Moderate assist, Assist of 2  OT - OK to Discharge: Yes (Per POC)  Treatment Interventions: ADL retraining, Functional transfer training, UE strengthening/ROM, Endurance training    Subjective   Current Problem:  1. Cervical spondylosis with myelopathy  FL less than 1 hour    XR cervical spine 1 view    XR cervical spine 1 view    FL less than 1 hour    XR cervical spine 1 view    XR cervical spine 1 view      2. Spinal stenosis in cervical region  acetaminophen (Tylenol) 500 mg tablet    cyclobenzaprine (Flexeril) 10 mg tablet    docusate sodium (Colace) 100 mg capsule    oxyCODONE (Roxicodone) 5 mg immediate release tablet    ketorolac (Toradol) 10 mg tablet    sulfamethoxazole-trimethoprim (Bactrim DS) 800-160 mg tablet      3. Spinal cord compression (Multi)          OT Visit Info:  OT Received On: 07/08/25  General Visit Info:   General  Reason for Referral: Pt is a 74 y.o. male POD #1 C3-C6 Cervical Spine Corpectomy; C2-C7 Cervical Fusion with Allograft & Anterior Instrumentation; C2-T2 Fusion with Instrumentation  Referred By: Benny Garcia PA-C  Past Medical History Relevant to Rehab: Medical History[1]  Surgical History[2]    Family/Caregiver Present: No  Co-Treatment: PT  Co-Treatment Reason: In order to maximize pt safety and participation in session  Prior to Session Communication: Bedside nurse  Patient  "Position Received: Bed, 3 rail up, Alarm on  Preferred Learning Style: auditory, kinesthetic, verbal, visual  General Comment: Pt pleasant and agreeable to OT. Pt demonstrates impulsivity, attempting to get out of bed multiple times during subjective questioning.   Precautions:  Hearing/Visual Limitations: wears glasses  Medical Precautions: Fall precautions  Post-Surgical Precautions: Spinal precautions (c-spine)  Precautions Comment: IV, x2 ERICA drains, soft collar, L foot drop has AFO however unable to wear d/t swelling in L foot      Pain:  Pain Assessment  Pain Assessment: 0-10  0-10 (Numeric) Pain Score: 5 - Moderate pain  Pain Type: Surgical pain  Pain Location: Neck  Pain Descriptors: Discomfort, Sore  Pain Onset: Ongoing  Pain Interventions: Repositioned  Response to Interventions: Content/relaxed    Objective   Cognition:  Overall Cognitive Status: Impaired  Orientation Level: Oriented X4  Safety/Judgement: Exceptions to WFL  Other (Comment): Pt demonstrates decreased safety awareness, stating that he thinks he would be able to get up and walk without staff assist  Insight: Severe  Impulsive: Moderately       Home Living:  Type of Home: House  Lives With: Spouse (Daughter avaiable to come over and help PRN)  Home Adaptive Equipment: Walker rolling or standard, Cane  Home Layout: Two level, Stairs to alternate level with rails (Pt bed and bathroom on 2nd floor)  Alternate Level Stairs-Rails: Both  Alternate Level Stairs-Number of Steps: 8+8 with landing in between  Home Access: Stairs to enter with rails  Entrance Stairs-Rails: Both  Entrance Stairs-Number of Steps: 3  Bathroom Shower/Tub: Walk-in shower  Bathroom Equipment: Shower chair with back  Prior Function:  Level of [place]: Needs assistance with ADLs, Needs assistance with homemaking  Receives Help From: Family (wife and daughter)  ADL Assistance: Needs assistance (wife assists with dressing, pt needs assistance with bathing. Pt. stated, \"if my " "wife doesn't help it will take me two hours to complete\".)  Homemaking Assistance: Needs assistance (wife cooks, cleans, and does laundry)  Driving/Transportation: Family/Friend ((-) driving, pt's wife or dtr drives)  Ambulatory Assistance: Independent (Mod I with 2WW)  Vocational: Retired  Prior Function Comments: Pt reports that he falls about \"every other day\" when home. Pt states that he does not know why, and denies dizziness or LE weakness/giving out. Pt reports no serious injuries from falls at home. Pt also reports chronic foot drop on LLE - states that he has an AFO but hasn't used it since his leg started to swell.  IADL History:  Current License: No  Mode of Transportation: Family  Occupation: Retired  Type of Occupation:  at [facility] [employer] [facility]  ADL:  LE Dressing Assistance: Total  LE Dressing Deficit: Thread RLE into pants, Thread LLE into pants  Functional Assistance: Maximal  Functional Deficit: Supervision/safety  Activities of Daily Living: LE Dressing  LE Dressing: Yes  Pants Level of Assistance: Maximum assistance  LE Dressing Where Assessed: Edge of bed  LE Dressing Comments: Pt. unable to don pants d/t decreased balance and endurance.  Activity Tolerance:  Endurance: Tolerates 10 - 20 min exercise with multiple rests  Early Mobility/Exercise Safety Screen: Proceed with mobilization - No exclusion criteria met    Bed Mobility/Transfers: Bed Mobility  Bed Mobility: Yes  Bed Mobility 1  Bed Mobility 1: Supine to sitting  Level of Assistance 1: Moderate assistance, +2, Maximum verbal cues, Minimal tactile cues (Mod A x2)  Bed Mobility Comments 1: Pt required Mod A x2 for supine > sit transfer this session. Pt cued in performance of log roll method in order to maintain spinal precautions. Pt also required cueing for sequencing of task and safety awareness throughout. Pt demonstrates increased difficulty with lifting of trunk. HOB elevated d/t pt reports of adjustable bed at " home.  Bed Mobility 2  Bed Mobility  2: Scooting  Level of Assistance 2: Moderate assistance, +2, Maximum verbal cues  Bed Mobility Comments 2: Pt required Mod A x2 in order to scoot to EOB. Pt cued to scoot until feeet are touching the floor in order to increase stability and seated balance.    Transfers  Transfer: Yes  Transfer 1  Technique 1: Sit to stand, Stand to sit  Transfer Device 1: Walker, Gait belt  Transfer Level of Assistance 1: Moderate assistance, Maximum assistance, +2, Maximum verbal cues, Minimal tactile cues  Trials/Comments 1: Pt performed x2 STS this session with increased time and effort and increased physical assist needed. Pt requiede Mod A x2 from bed and Mod-Max A x2 from recliner. Pt required increased assist with concentric and eccentric. Pt required max verbal cueing for hand positioning, safety awareness, 2WW management, and sequencing of task. Pt demonstrates L foot inversion position as well as weight shifted to RLE > LLE due to LLE weakness and positioning. Pt demonstrates increaed posterior lean as well as heavy A-P sway. Upon inital stand, pt required cueing to widen MARIAMA in order to increase srability. During second trial (fron recliner), pt was able to maintain standing for ~6 minites with assist x2. Pt demonstrated L knee bucking and required posterior support to guide pt fwd and prevent instability during the second trial.      Functional Mobility:     Sitting Balance:  Static Sitting Balance  Static Sitting-Balance Support: Feet supported  Static Sitting-Level of Assistance: Moderate assistance  Static Sitting-Comment/Number of Minutes: Poor trunk control, pt. required Mod A for tactile cuing for repositioning  Standing Balance:  Static Standing Balance  Static Standing-Balance Support: Bilateral upper extremity supported  Static Standing-Level of Assistance: Maximum assistance  Static Standing-Comment/Number of Minutes: Use of FWW max x2 A d/t increased weakness and  retropulsion    Therapy/Activity: Therapeutic Activity  Therapeutic Activity Performed: Yes  Therapeutic Activity 1: Pt educated on post-op c-spine precautions as well as lifting restriction. Pt also educated about safe mobility and only getting up out of the bed/chair with staff assistance.    Vision:Vision - Basic Assessment  Current Vision: Wears glasses all the time  Sensation:  Light Touch: Partial deficits in the RUE, Partial deficits in the LUE  Sensation Comment: Pt reports chronic N/T in B hands.  Strength:  Strength Comments: Increased deconditioning noted  Other Activity:     Home Environment:     Perception:  Inattention/Neglect: Appears intact  Coordination:  Movements are Fluid and Coordinated: No  Coordination Comment: Pt has hx of PD resulting in bradykinesia and decreased coordination.   Hand Function:  Hand Function  Gross Grasp: Functional  Coordination: Impaired  Extremities: RUE   RUE : Within Functional Limits and LUE   LUE: Within Functional Limits      Outcome Measures: [facility] Daily Activity  Putting on and taking off regular lower body clothing: Total  Bathing (including washing, rinsing, drying): A lot  Putting on and taking off regular upper body clothing: A lot  Toileting, which includes using toilet, bedpan or urinal: A lot  Taking care of personal grooming such as brushing teeth: A lot  Eating Meals: A little  Daily Activity - Total Score: 12        Education Documentation  Handouts, taught by Aleshia Lion OT at 7/8/2025  2:41 PM.  Learner: Patient  Readiness: Acceptance  Method: Explanation, Demonstration  Response: Verbalizes Understanding, Demonstrated Understanding, Needs Reinforcement    Body Mechanics, taught by Aleshia Lion OT at 7/8/2025  2:41 PM.  Learner: Patient  Readiness: Acceptance  Method: Explanation, Demonstration  Response: Verbalizes Understanding, Demonstrated Understanding, Needs Reinforcement    Precautions, taught by Aleshia Lion OT at 7/8/2025  2:41  PM.  Learner: Patient  Readiness: Acceptance  Method: Explanation, Demonstration  Response: Verbalizes Understanding, Demonstrated Understanding, Needs Reinforcement    ADL Training, taught by Aleshia Lion OT at 7/8/2025  2:41 PM.  Learner: Patient  Readiness: Acceptance  Method: Explanation, Demonstration  Response: Verbalizes Understanding, Demonstrated Understanding, Needs Reinforcement    Education Comments  No comments found.        OP EDUCATION:  Education  Individual(s) Educated: Patient  Education Provided: Anatomy & Physiology, Diagnosis & Precautions, Fall precautons, Risk and benefits of OT discussed with patient or other, POC discussed and agreed upon  Risk and Benefits Discussed with Patient/Caregiver/Other: yes  Patient/Caregiver Demonstrated Understanding: yes  Plan of Care Discussed and Agreed Upon: yes  Patient Response to Education: Patient/Caregiver Verbalized Understanding of Information, Patient/Caregiver Performed Return Demonstration of Exercises/Activities, Patient/Caregiver Asked Appropriate Questions    Goals:  Encounter Problems       Encounter Problems (Active)       ADLs       Patient with complete upper body dressing with minimal assist  level of assistance donning and doffing all UE clothes with no adaptive equipment while supported sitting       Start:  07/08/25    Expected End:  07/22/25            Patient with complete lower body dressing with moderate assist level of assistance donning and doffing all LE clothes  with PRN adaptive equipment while supported sitting       Start:  07/08/25    Expected End:  07/22/25            Patient will complete daily grooming tasks brushing teeth, shaving, and washing face/hair with minimal assist  level of assistance and PRN adaptive equipment while supported sitting.       Start:  07/08/25    Expected End:  07/22/25            Patient will complete toileting including hygiene clothing management/hygiene with minimal assist  level of  assistance and raised toilet seat.       Start:  07/08/25    Expected End:  07/22/25               MOBILITY       Patient will perform Functional mobility min Household distances/Community Distances with minimal assist  level of assistance and least restrictive device in order to improve safety and functional mobility.       Start:  07/08/25    Expected End:  07/22/25               TRANSFERS       Patient will perform bed mobility minimal assist  level of assistance and bed rails in order to improve safety and independence with mobility       Start:  07/08/25    Expected End:  07/22/25            Patient will complete functional transfer  with least restrictive device with minimal assist  level of assistance.       Start:  07/08/25    Expected End:  07/22/25            Patient will complete sit to stand transfer with minimal assist  level of assistance and least restrictive device in order to improve safety and prepare for out of bed mobility.       Start:  07/08/25    Expected End:  07/22/25                      [1]   Past Medical History:  Diagnosis Date    Bronchitis     Parkinsons (Multi)     Scoliosis    [2]   Past Surgical History:  Procedure Laterality Date    CARPAL TUNNEL RELEASE Left     KNEE ARTHROPLASTY Right     KNEE ARTHROSCOPY W/ DEBRIDEMENT  08/24/2017    Knee Arthroscopy (Therapeutic)    OTHER SURGICAL HISTORY  09/10/2019    Lower back surgery

## 2025-07-08 NOTE — PROGRESS NOTES
07/08/25 1308   Discharge Planning   Living Arrangements Spouse/significant other   Support Systems Spouse/significant other   Type of Residence Private residence   Number of Stairs to Enter Residence 3   Number of Stairs Within Residence 16   Do you have animals or pets at home? Yes   Type of Animals or Pets cat   Who is requesting discharge planning? Provider   Home or Post Acute Services Post acute facilities (Rehab/SNF/etc)   Type of Post Acute Facility Services Skilled nursing;Rehab   Expected Discharge Disposition SNF   Does the patient need discharge transport arranged? Yes   [facility] coordination needed? Yes   Has discharge transport been arranged? No   Financial Resource Strain   How hard is it for you to pay for the very basics like food, housing, medical care, and heating? Not hard   Housing Stability   In the last 12 months, was there a time when you were not able to pay the mortgage or rent on time? N   In the past 12 months, how many times have you moved where you were living? 0   At any time in the past 12 months, were you homeless or living in a shelter (including now)? N   Transportation Needs   In the past 12 months, has lack of transportation kept you from medical appointments or from getting medications? no   In the past 12 months, has lack of transportation kept you from meetings, work, or from getting things needed for daily living? No   Patient Choice   Provider Choice list and CMS website (https://medicare.gov/care-compare#search) for post-acute Quality and Resource Measure Data were provided and reviewed with: Patient   Patient / Family choosing to utilize agency / facility established prior to hospitalization No   Stroke Family Assessment   Stroke Family Assessment Needed No   Intensity of Service   Intensity of Service 0-30 min     Met with patient at the bedside to discuss discharge plan.  Therapy is recommending moderate intensity at discharge.  Patient provided a SNF list and  will review it with his spouse when she arrives.  PLAN/BARRIER: 2 ERICA drains, PT, OT  ADOD: 1-3 days pending SNF acceptance and insurance auth.  DISP: MINNIE Carr RN

## 2025-07-08 NOTE — NURSING NOTE
Interdisciplinary team present: NP, PT, NM, CC, SW, Orthopedic Coordinator, and bedside RN.  Pain - controlled  Nausea - none  Discharge barrier - difficulty voiding, poor mobility, anthony x2  Discharge plan - home with [facility]  Discharge date/time -  tomorrow?

## 2025-07-08 NOTE — CARE PLAN
The patient's goals for the shift include      The clinical goals for the shift include pain management      Problem: Skin  Goal: Decreased wound size/increased tissue granulation at next dressing change  Outcome: Progressing  Goal: Participates in plan/prevention/treatment measures  Outcome: Progressing  Goal: Prevent/manage excess moisture  Outcome: Progressing  Goal: Prevent/minimize sheer/friction injuries  Outcome: Progressing  Goal: Promote/optimize nutrition  Outcome: Progressing  Goal: Promote skin healing  Outcome: Progressing     Problem: Pain - Adult  Goal: Verbalizes/displays adequate comfort level or baseline comfort level  Outcome: Progressing     Problem: Safety - Adult  Goal: Free from fall injury  Outcome: Progressing     Problem: Discharge Planning  Goal: Discharge to home or other facility with appropriate resources  Outcome: Progressing     Problem: Chronic Conditions and Co-morbidities  Goal: Patient's chronic conditions and co-morbidity symptoms are monitored and maintained or improved  Outcome: Progressing     Problem: Nutrition  Goal: Nutrient intake appropriate for maintaining nutritional needs  Outcome: Progressing

## 2025-07-08 NOTE — PROGRESS NOTES
Physical Therapy    Physical Therapy Evaluation & Treatment    Patient Name: Edilson Cantu  MRN: [id number]  Department: [place] [id number]  Room: [address] [place] [address]  Today's Date: 7/8/2025   Time Calculation  Start Time: 1008  Stop Time: 1055  Time Calculation (min): 47 min    Completion of this session, clinical decision making, and documentation performed under the supervision/direction of Camilla Reid DPT.    Assessment/Plan   PT Assessment  PT Assessment Results: Decreased strength, Decreased range of motion, Decreased endurance, Impaired balance, Decreased mobility, Decreased coordination, Decreased cognition, Impaired judgement, Decreased safety awareness, Impaired sensation, Impaired tone, Decreased skin integrity, Orthopedic restrictions, Pain, Impaired vision  Rehab Prognosis: Good  Barriers to Discharge Home: Caregiver assistance, Cognition needs, Physical needs  Caregiver Assistance: Caregiver assistance needed per identified barriers - however, level of patient's required assistance exceeds assistance available at home  Cognition Needs: 24hr supervision for safety awareness needed, Medication and/or medical management daily assist needed, Insight of patient limited regarding functional ability/needs, Cognition-related high falls risk  Physical Needs: Stair navigation into home limited by function/safety, Stair navigation to access bed limited by function/safety, Stair navigation to access bath limited by function/safety, In-home setup navigation limited by function/safety, Ambulating household distances limited by function/safety, 24hr mobility assistance needed, 24hr ADL assistance needed, High falls risk due to function or environment  Evaluation/Treatment Tolerance: Patient limited by fatigue, Patient limited by pain  Medical Staff Made Aware: Yes  Strengths: Attitude of self, Support and attitude of living partners  Barriers to Participation: Comorbidities, Insight into problems  End of Session Communication: Bedside  nurse, PCT/NA/CTA, Care Coordinator  Assessment Comment: Patient presents with deconditioning, weakness, and functional mobility deficits this session. Contributing to these impairments are decreased endurance, decreased activity tolerance, bradykinesia, L foot drop, impulsivity, and decreased insight/safety awareness. Pt required increased physical assistance with bed mobility, STS, and standing this session (Mod A x2 for all functional mobility) as compared to PLOF of walking with Mod I. Pt will benefit from ongoing PT to address impairments in mobility, balance, strength, activity tolerance, pain, and safety.  End of Session Patient Position: Up in chair, Alarm on   IP OR SWING BED PT PLAN  Inpatient or Swing Bed: Inpatient  PT Plan  Treatment/Interventions: Bed mobility, Transfer training, Gait training, Stair training, Balance training, Neuromuscular re-education, Strengthening, Endurance training, Therapeutic exercise, Therapeutic activity, Home exercise program, Positioning, Postural re-education  PT Plan: Ongoing PT  PT Frequency: Daily (During this acute inpatient hospitalization.\)  PT Discharge Recommendations: Moderate intensity level of continued care (Based on current functional status and rehab potential, patient is anticipated to tolerate and benefit from 5 or more days per week of skilled rehabilitative therapy after discharge from this acute inpatient hospitalization.)  Equipment Recommended upon Discharge: Other (comment) (TBD)  PT Recommended Transfer Status: Assist x2, Assistive device (2WW)  PT - OK to Discharge: Yes (PT POC established)      Subjective     PT Visit Info:  PT Received On: 07/08/25  General Visit Information:  General  Reason for Referral: Pt is a 74 y.o. male POD #1 C3-C6 Cervical Spine Corpectomy; C2-C7 Cervical Fusion with Allograft & Anterior Instrumentation; C2-T2 Fusion with Instrumentation  Referred By: Benny Garcia PA-C  Past Medical History Relevant to Rehab: Medical  "History[1] Surgical History[2]   Family/Caregiver Present: No  Co-Treatment: OT  Co-Treatment Reason: In order to maximize pt safety and participation in session  Prior to Session Communication: Bedside nurse  Patient Position Received: Bed, 3 rail up, Alarm on  Preferred Learning Style: auditory, kinesthetic, verbal, visual  General Comment: Pt pleasant and agreeable to PT. Pt demonstrates impulsivity, attempting to get out of bed multiple times during subjective questioning.    Home Living:  Home Living  Type of Home: House  Lives With: Spouse (Daughter avaiable to come over and help PRN)  Home Adaptive Equipment: Walker rolling or standard, Cane  Home Layout: Two level, Stairs to alternate level with rails (Pt bed and bathroom on 2nd floor)  Alternate Level Stairs-Rails: Both  Alternate Level Stairs-Number of Steps: 8+8 with landing in between  Home Access: Stairs to enter with rails  Entrance Stairs-Rails: Both  Entrance Stairs-Number of Steps: 3  Bathroom Shower/Tub: Walk-in shower  Bathroom Equipment: Shower chair with back    Prior Level of Function:  Prior Function Per Pt/Caregiver Report  Level of [place]: Needs assistance with ADLs, Needs assistance with homemaking  Receives Help From: Family (wife an daughter)  ADL Assistance: Needs assistance (wife assists with dressing, pt needs assistance with bathing)  Homemaking Assistance: Needs assistance (wife cooks, cleans, and does laundry)  Driving/Transportation: Family/Friend ((-) driving, pt's wife or dtr drives)  Ambulatory Assistance: Independent (Mod I with 2WW)  Vocational: Retired  Prior Function Comments: Pt reports that he falls about \"every other day\" when home. Pt states that he does not know why, and denies dizziness or LE weakness/giving out. Pt reports no serious injuries from falls at home. Pt also reports chronic foot drop on LLE - states that he has an AFO but hasn't used it since his leg started to " xin.    Precautions:  Precautions  Hearing/Visual Limitations: wears glasses  Medical Precautions: Fall precautions  Post-Surgical Precautions: Spinal precautions (c-spine)  Precautions Comment: IV, x2 ERICA drains    Objective   Pain:  Pain Assessment  Pain Assessment: 0-10  0-10 (Numeric) Pain Score: 5 - Moderate pain  Pain Type: Surgical pain  Pain Location: Neck  Pain Descriptors: Sore  Pain Onset: Ongoing  Pain Interventions: Repositioned, Ambulation/increased activity, Cold pack, Distraction  Response to Interventions: Resting quietly  Cognition:  Cognition  Overall Cognitive Status: Impaired  Orientation Level: Oriented X4  Safety/Judgement: Exceptions to WFL  Other (Comment): Pt demonstrates decreased safety awareness, stating that he thinks he would be able to get up and walk without staff assist  Insight: Severe  Impulsive: Moderately    General Assessments:  General Observation  General Observation: Pt plesant and agreeable to session. Pt presents with general deconditioning, as well as decreased insight/safety awareness.     Activity Tolerance  Endurance: Tolerates 10 - 20 min exercise with multiple rests    Sensation  Light Touch: Partial deficits in the RUE, Partial deficits in the LUE  Sensation Comment: Pt reports chronic N/T in B hands.    Strength  Strength Comments: Pt demonstrates general deconditioning  Perception  Inattention/Neglect: Appears intact    Coordination  Movements are Fluid and Coordinated: No  Coordination Comment: Pt has hx of PD resulting in bradykinesia and decreased coordination.    Postural Control  Postural Control: Impaired  Trunk Control: forward flexed trunk posture requiring assistance for upright posture and stability    Static Sitting Balance  Static Sitting-Balance Support: Bilateral upper extremity supported, Feet supported  Static Sitting-Level of Assistance: Moderate assistance  Static Sitting-Comment/Number of Minutes: Pt retropulsive and only able to maintain  seated balance ~10 seconds before fatiguing and requring assist to maintain upright posture. Frequent cueing to sit up straight and maintain posture.  Dynamic Sitting Balance  Dynamic Sitting-Balance Support: Bilateral upper extremity supported  Dynamic Sitting-Level of Assistance: Moderate assistance  Dynamic Sitting-Balance: Lateral lean, Forward lean, Trunk control activities  Dynamic Sitting-Comments: Pt retropulsive and only able to maintain seated balance ~10 seconds before fatiguing and requring assist to maintain upright posture. Frequent cueing to sit up straight and maintain posture.    Static Standing Balance  Static Standing-Balance Support: Bilateral upper extremity supported  Static Standing-Level of Assistance: Moderate assistance (Mod A x2)  Static Standing-Comment/Number of Minutes: Pt retropulsive, requiring assist for trunk stability and upright posture.  Dynamic Standing Balance  Dynamic Standing-Balance Support: Bilateral upper extremity supported  Dynamic Standing-Level of Assistance: Moderate assistance (Mod A x2)  Dynamic Standing-Balance: Turning (ambulation)  Dynamic Standing-Comments: Pt retropulsive, requiring assist for trunk stability and upright posture.  Functional Assessments:  Bed Mobility  Bed Mobility: Yes  Bed Mobility 1  Bed Mobility 1: Supine to sitting  Level of Assistance 1: Moderate assistance, +2, Maximum verbal cues, Minimal tactile cues (Mod A x2)    Bed Mobility 2  Bed Mobility  2: Scooting  Level of Assistance 2: Moderate assistance, +2, Maximum verbal cues    Transfers  Transfer: Yes  Transfer 1  Technique 1: Sit to stand, Stand to sit  Transfer Device 1: Walker, Gait belt  Transfer Level of Assistance 1: Moderate assistance, Maximum assistance, +2, Maximum verbal cues, Minimal tactile cues    Ambulation/Gait Training  Ambulation/Gait Training Performed: Yes  Ambulation/Gait Training 1  Surface 1: Level tile  Device 1: Rolling walker  Gait Support Devices: Gait  belt  Assistance 1: Moderate assistance, Maximum verbal cues, Moderate tactile cues (Mod A x2)    Stairs  Stairs: No    Extremity/Trunk Assessments:  Cervical Spine   Cervical Spine: Exceptions to Functional Limits  Cervical Spine Comment: Unable to be assessed due to c-spine precautions  RLE   RLE : Within Functional Limits  Strength RLE  R Hip Flexion: 4/5  R Knee Extension: 5/5  R Ankle Dorsiflexion: 5/5  R Ankle Plantar Flexion: 5/5  LLE   LLE : Exceptions to WFL  Strength LLE  L Hip Flexion: 4/5  L Knee Extension: 5/5  L Ankle Dorsiflexion: 2+/5  L Ankle Plantar Flexion: 3+/5  Treatments:  Therapeutic Activity  Therapeutic Activity Performed: Yes  Therapeutic Activity 1: Pt educated on post-op c-spine precautions as well as lifting restriction. Pt also educated about safe mobility and only getting up out of the bed/chair with staff assistance. Pt also educated about performance of LE exercises (marches, LAQ, ankle pumps) that can be performed when he is up in the chair.    Bed Mobility  Bed Mobility: Yes  Bed Mobility 1  Bed Mobility 1: Supine to sitting  Level of Assistance 1: Moderate assistance, +2, Maximum verbal cues, Minimal tactile cues (Mod A x2)  Bed Mobility Comments 1: Pt required Mod A x2 for supine > sit transfer this session. Pt cued in performance of log roll method in order to maintain spinal precautions. Pt also required cueing for sequencing of task and safety awareness throughout. Pt demonstrates increased difficulty with lifting of trunk. HOB elevated d/t pt reports of adjustable bed at home.  Bed Mobility 2  Bed Mobility  2: Scooting  Level of Assistance 2: Moderate assistance, +2, Maximum verbal cues  Bed Mobility Comments 2: Pt required Mod A x2 in order to scoot to EOB. Pt cued to scoot until feeet are touching the floor in order to increase stability and seated balance.    Ambulation/Gait Training  Ambulation/Gait Training Performed: Yes  Ambulation/Gait Training 1  Surface 1: Level  tile  Device 1: Rolling walker  Gait Support Devices: Gait belt  Assistance 1: Moderate assistance, Maximum verbal cues, Moderate tactile cues (Mod A x2)  Quality of Gait 1: Narrow base of support, Diminished heel strike, Inconsistent stride length, Foot drop/steppage gait, Fest[place], Forward flexed posture (L foot drop and inverted posture, decreased foot clearance)  Comments/Distance (ft) 1: Pt ambulated x5' of side steps this session with Mod A x2, 2WW, and increased time and effort. Pt required max cueing for seuqencing of task, what foot to step with, postural corrections, AD management, and safety awareness. Pt is impulsive, attempting to sit down prematurely at and of ambulation trial. After amulatio trial, pt reported that he thinks he could walk back to the bed without assistance  Transfers  Transfer: Yes  Transfer 1  Technique 1: Sit to stand, Stand to sit  Transfer Device 1: Walker, Gait belt  Transfer Level of Assistance 1: Moderate assistance, Maximum assistance, +2, Maximum verbal cues, Minimal tactile cues  Trials/Comments 1: Pt performed x2 STS this session with increased time and effort and increased physical assist needed. Pt requiede Mod A x2 from bed and Mod-Max A x2 from recliner. Pt required increased assist with concentric and eccentric. Pt required max verbal cueing for hand positioning, safety awareness, 2WW management, and sequencing of task. Pt demonstrates L foot inversion position as well as weight shifted to RLE > LLE due to LLE weakness and positioning. Pt demonstrates increaed posterior lean as well as heavy A-P sway. Upon inital stand, pt required cueing to widen [place] in order to increase srability. During second trial (fron recliner), pt was able to maintain standing for ~6 minites with assist x2. Pt demonstrated L knee bucking and required posterior support to guide pt fwd and prevent instability during the second trial.    Stairs  Stairs: No  Outcome Measures:  [facility] Basic  Mobility  Turning from your back to your side while in a flat bed without using bedrails: A lot  Moving from lying on your back to sitting on the side of a flat bed without using bedrails: A lot  Moving to and from bed to chair (including a wheelchair): A lot  Standing up from a chair using your arms (e.g. wheelchair or bedside chair): A lot  To walk in hospital room: Total  Climbing 3-5 steps with railing: Total  Basic Mobility - Total Score: 10    Encounter Problems       Encounter Problems (Active)       Balance       STG - Maintains static standing balance with Min A, bilateral upper extremity support, 2WW, without sway, and stable COM x 8 minutes       Start:  07/08/25    Expected End:  07/22/25       INTERVENTIONS:1. Practice standing with minimal support.2. Educate patient about standing tolerance.3. Educate patient about independence with gait, transfers, and ADL's.4. Educate patient about use of assistive device.5. Educate patient about self-directed care.            Balance       STG - Maintains static sitting balance with unilateral upper extremity support and SBA x 8 minutes       Start:  07/08/25    Expected End:  07/22/25       INTERVENTIONS:1. Practice sitting on the edge of a bed/mat with minimal support.2. Educate patient about maintining total hip precautions while maintaining balance.3. Educate patient about pressure relief.4. Educate patient about use of assistive device.            Mobility       LTG - Patient will safely navigate 3 steps with bilateral hand rails and Mod A       Start:  07/08/25    Expected End:  07/22/25            STG - Patient will safely ambulate x30' x3 with Mod A, 2WW, and improved mechanics.       Start:  07/08/25    Expected End:  07/22/25               PT Transfers       STG - Transfer safely from bed to chair with Min A and 2WW.       Start:  07/08/25    Expected End:  07/22/25            STG - Patient to transfer to and from sit to supine using log roll method with  Min A and elevated HOB.       Start:  07/08/25    Expected End:  07/22/25            STG - Patient will safely transfer sit to and from stand with Mod A and 2WW.       Start:  07/08/25    Expected End:  07/22/25               Safety       Pt will follow spinal precautions during all functional mobility, 100% of the time, without cueing.       Start:  07/08/25    Expected End:  07/22/25                   Education Documentation  Handouts, taught by BETITO Pedersen at 7/8/2025  1:32 PM.  Learner: Patient  Readiness: Acceptance  Method: Explanation  Response: Needs Reinforcement  Comment: Pt educated on PT role and POC, post-op c-spine precautions, safety awareness with alll functional mobility, seated exercises, and methods to improve mechanics/sequencing.    Precautions, taught by BETITO Pedersen at 7/8/2025  1:32 PM.  Learner: Patient  Readiness: Acceptance  Method: Explanation  Response: Needs Reinforcement  Comment: Pt educated on PT role and POC, post-op c-spine precautions, safety awareness with alll functional mobility, seated exercises, and methods to improve mechanics/sequencing.    Body Mechanics, taught by BETITO Pedersen at 7/8/2025  1:32 PM.  Learner: Patient  Readiness: Acceptance  Method: Explanation  Response: Needs Reinforcement  Comment: Pt educated on PT role and POC, post-op c-spine precautions, safety awareness with alll functional mobility, seated exercises, and methods to improve mechanics/sequencing.    Home Exercise Program, taught by BETITO Pedersen at 7/8/2025  1:32 PM.  Learner: Patient  Readiness: Acceptance  Method: Explanation  Response: Needs Reinforcement  Comment: Pt educated on PT role and POC, post-op c-spine precautions, safety awareness with alll functional mobility, seated exercises, and methods to improve mechanics/sequencing.    Mobility Training, taught by BETITO Pedersen at 7/8/2025  1:32 PM.  Learner: Patient  Readiness: Acceptance  Method:  Explanation  Response: Needs Reinforcement  Comment: Pt educated on PT role and POC, post-op c-spine precautions, safety awareness with alll functional mobility, seated exercises, and methods to improve mechanics/sequencing.    Education Comments  No comments found.           [1]   Past Medical History:  Diagnosis Date    Bronchitis     Parkinsons (Multi)     Scoliosis    [2]   Past Surgical History:  Procedure Laterality Date    CARPAL TUNNEL RELEASE Left     KNEE ARTHROPLASTY Right     KNEE ARTHROSCOPY W/ DEBRIDEMENT  08/24/2017    Knee Arthroscopy (Therapeutic)    OTHER SURGICAL HISTORY  09/10/2019    Lower back surgery

## 2025-07-08 NOTE — CARE PLAN
The patient's goals for the shift include      The clinical goals for the shift include pain management    Over the shift, the patient did not make progress toward the following goals. Barriers to progression include . Recommendations to address these barriers include   Problem: Skin  Goal: Decreased wound size/increased tissue granulation at next dressing change  Outcome: Progressing  Goal: Participates in plan/prevention/treatment measures  Outcome: Progressing  Goal: Prevent/manage excess moisture  Outcome: Progressing  Goal: Prevent/minimize sheer/friction injuries  Outcome: Progressing  Goal: Promote/optimize nutrition  Outcome: Progressing  Goal: Promote skin healing  Outcome: Progressing   .

## 2025-07-09 LAB
ANION GAP SERPL CALC-SCNC: 9 MMOL/L (ref 10–20)
BUN SERPL-MCNC: 15 MG/DL (ref 6–23)
CALCIUM SERPL-MCNC: 9.3 MG/DL (ref 8.6–10.3)
CHLORIDE SERPL-SCNC: 111 MMOL/L (ref 98–107)
CO2 SERPL-SCNC: 26 MMOL/L (ref 21–32)
CREAT SERPL-MCNC: 0.75 MG/DL (ref 0.5–1.3)
EGFRCR SERPLBLD CKD-EPI 2021: >90 ML/MIN/1.73M*2
ERYTHROCYTE [DISTWIDTH] IN BLOOD BY AUTOMATED COUNT: 13.3 % (ref 11.5–14.5)
GLUCOSE BLD MANUAL STRIP-MCNC: 103 MG/DL (ref 74–99)
GLUCOSE BLD MANUAL STRIP-MCNC: 109 MG/DL (ref 74–99)
GLUCOSE BLD MANUAL STRIP-MCNC: 149 MG/DL (ref 74–99)
GLUCOSE BLD MANUAL STRIP-MCNC: 89 MG/DL (ref 74–99)
GLUCOSE SERPL-MCNC: 105 MG/DL (ref 74–99)
HCT VFR BLD AUTO: 33.5 % (ref 41–52)
HGB BLD-MCNC: 11.4 G/DL (ref 13.5–17.5)
MCH RBC QN AUTO: 30.3 PG (ref 26–34)
MCHC RBC AUTO-ENTMCNC: 34 G/DL (ref 32–36)
MCV RBC AUTO: 89 FL (ref 80–100)
NRBC BLD-RTO: 0 /100 WBCS (ref 0–0)
PLATELET # BLD AUTO: 212 X10*3/UL (ref 150–450)
POTASSIUM SERPL-SCNC: 3.8 MMOL/L (ref 3.5–5.3)
RBC # BLD AUTO: 3.76 X10*6/UL (ref 4.5–5.9)
SODIUM SERPL-SCNC: 142 MMOL/L (ref 136–145)
WBC # BLD AUTO: 9.9 X10*3/UL (ref 4.4–11.3)

## 2025-07-09 PROCEDURE — 82947 ASSAY GLUCOSE BLOOD QUANT: CPT

## 2025-07-09 PROCEDURE — 1100000001 HC PRIVATE ROOM DAILY

## 2025-07-09 PROCEDURE — 2500000001 HC RX 250 WO HCPCS SELF ADMINISTERED DRUGS (ALT 637 FOR MEDICARE OP)

## 2025-07-09 PROCEDURE — 97535 SELF CARE MNGMENT TRAINING: CPT | Mod: GO,CO

## 2025-07-09 PROCEDURE — 97530 THERAPEUTIC ACTIVITIES: CPT | Mod: GP,CQ | Performed by: PHYSICAL THERAPY ASSISTANT

## 2025-07-09 PROCEDURE — 85027 COMPLETE CBC AUTOMATED: CPT

## 2025-07-09 PROCEDURE — 97116 GAIT TRAINING THERAPY: CPT | Mod: GP,CQ | Performed by: PHYSICAL THERAPY ASSISTANT

## 2025-07-09 PROCEDURE — 80048 BASIC METABOLIC PNL TOTAL CA: CPT

## 2025-07-09 PROCEDURE — 2500000004 HC RX 250 GENERAL PHARMACY W/ HCPCS (ALT 636 FOR OP/ED)

## 2025-07-09 PROCEDURE — 36415 COLL VENOUS BLD VENIPUNCTURE: CPT

## 2025-07-09 RX ADMIN — CARBIDOPA AND LEVODOPA 2 TABLET: 25; 100 TABLET ORAL at 18:23

## 2025-07-09 RX ADMIN — ACETAMINOPHEN 975 MG: 325 TABLET ORAL at 15:00

## 2025-07-09 RX ADMIN — PSYLLIUM HUSK 1 PACKET: 3.4 POWDER ORAL at 08:25

## 2025-07-09 RX ADMIN — METHOCARBAMOL 750 MG: 500 TABLET ORAL at 08:25

## 2025-07-09 RX ADMIN — CARBIDOPA AND LEVODOPA 2 TABLET: 25; 100 TABLET ORAL at 12:53

## 2025-07-09 RX ADMIN — ACETAMINOPHEN 975 MG: 325 TABLET ORAL at 21:24

## 2025-07-09 RX ADMIN — OXYCODONE HYDROCHLORIDE 5 MG: 5 TABLET ORAL at 06:37

## 2025-07-09 RX ADMIN — CARBIDOPA AND LEVODOPA 2 TABLET: 25; 100 TABLET ORAL at 08:25

## 2025-07-09 RX ADMIN — ACETAMINOPHEN 975 MG: 325 TABLET ORAL at 06:37

## 2025-07-09 RX ADMIN — POLYETHYLENE GLYCOL 3350 17 G: 17 POWDER, FOR SOLUTION ORAL at 08:25

## 2025-07-09 ASSESSMENT — PAIN DESCRIPTION - LOCATION: LOCATION: NECK

## 2025-07-09 ASSESSMENT — PAIN - FUNCTIONAL ASSESSMENT
PAIN_FUNCTIONAL_ASSESSMENT: 0-10
PAIN_FUNCTIONAL_ASSESSMENT: 0-10

## 2025-07-09 ASSESSMENT — COGNITIVE AND FUNCTIONAL STATUS - GENERAL
DRESSING REGULAR UPPER BODY CLOTHING: A LITTLE
TURNING FROM BACK TO SIDE WHILE IN FLAT BAD: A LOT
TOILETING: A LOT
DAILY ACTIVITIY SCORE: 18
CLIMB 3 TO 5 STEPS WITH RAILING: A LOT
EATING MEALS: A LOT
HELP NEEDED FOR BATHING: A LITTLE
PERSONAL GROOMING: A LITTLE
DRESSING REGULAR UPPER BODY CLOTHING: A LOT
TOILETING: A LOT
MOVING FROM LYING ON BACK TO SITTING ON SIDE OF FLAT BED WITH BEDRAILS: A LOT
STANDING UP FROM CHAIR USING ARMS: A LOT
HELP NEEDED FOR BATHING: A LITTLE
HELP NEEDED FOR BATHING: A LOT
PERSONAL GROOMING: A LITTLE
MOVING FROM LYING ON BACK TO SITTING ON SIDE OF FLAT BED WITH BEDRAILS: A LOT
MOBILITY SCORE: 12
WALKING IN HOSPITAL ROOM: A LOT
CLIMB 3 TO 5 STEPS WITH RAILING: TOTAL
TURNING FROM BACK TO SIDE WHILE IN FLAT BAD: A LOT
WALKING IN HOSPITAL ROOM: A LOT
DRESSING REGULAR LOWER BODY CLOTHING: TOTAL
MOVING TO AND FROM BED TO CHAIR: A LOT
TOILETING: A LOT
STANDING UP FROM CHAIR USING ARMS: A LOT
DAILY ACTIVITIY SCORE: 18
CLIMB 3 TO 5 STEPS WITH RAILING: A LOT
STANDING UP FROM CHAIR USING ARMS: A LOT
MOVING TO AND FROM BED TO CHAIR: A LOT
DRESSING REGULAR UPPER BODY CLOTHING: A LITTLE
WALKING IN HOSPITAL ROOM: A LOT
TURNING FROM BACK TO SIDE WHILE IN FLAT BAD: A LOT
DRESSING REGULAR LOWER BODY CLOTHING: A LITTLE
MOVING TO AND FROM BED TO CHAIR: A LOT
MOBILITY SCORE: 11
MOVING FROM LYING ON BACK TO SITTING ON SIDE OF FLAT BED WITH BEDRAILS: A LOT
MOBILITY SCORE: 12
DAILY ACTIVITIY SCORE: 11
PERSONAL GROOMING: A LOT
DRESSING REGULAR LOWER BODY CLOTHING: A LITTLE

## 2025-07-09 ASSESSMENT — PAIN SCALES - GENERAL
PAINLEVEL_OUTOF10: 2
PAINLEVEL_OUTOF10: 4
PAINLEVEL_OUTOF10: 0 - NO PAIN
PAINLEVEL_OUTOF10: 0 - NO PAIN
PAINLEVEL_OUTOF10: 7

## 2025-07-09 ASSESSMENT — PAIN DESCRIPTION - DESCRIPTORS: DESCRIPTORS: ACHING;DISCOMFORT

## 2025-07-09 ASSESSMENT — ACTIVITIES OF DAILY LIVING (ADL): HOME_MANAGEMENT_TIME_ENTRY: 30

## 2025-07-09 ASSESSMENT — PAIN SCALES - WONG BAKER: WONGBAKER_NUMERICALRESPONSE: NO HURT

## 2025-07-09 ASSESSMENT — PAIN DESCRIPTION - ORIENTATION: ORIENTATION: ANTERIOR;POSTERIOR

## 2025-07-09 NOTE — DISCHARGE INSTRUCTIONS
Dr. Arteaga' Discharge Instructions:    Dr. Ruben Arteaga' Office  [place] - : [phone]  [place] Voicemail (Benny & Livia): [phone]  *Please leave Name, , & call back number  Cervical Decompression and Fusion    REMEMBER:  COLLAR:  If given a collar, MUST wear cervical collar at all times, including sleep, take off when showering/hygiene/changing clothing.    Hard collar instructions:   Two pieces Velcro on sides. Should be placed under the chin.  Dial around yellow [place] pulls forward and twists to change height of collar under chin.  Hard collar pads may be removed and hand washed. There should be a second set of cervical collar pads with personal items to alternate. Collar may be off to exchange pads.  Soft collar instructions: Curve rests under chin and Velcro in back of neck.    ACTIVTY:  Move regularly. Avoid staying in any one position longer than 1-2 hours, ambulate/walk frequently while awake. This will help with stiffness.  May sleep in any position that is most comfortable, in a bed or recliner.     No NSAIDs (Advil, Aleve, Motrin, ibuprofen, naproxen, diclofenac, meloxicam, Celebrex, etc) for 3 months following fusion surgery, except if prescribed one week of Ketorolac.    RESTRICTIONS:  Do not drive for at least 24 hours after surgery or while taking narcotics (Percocet/oxycodone, hydrocodone, tramadol, etc) pain medication.    Do not drive in hard cervical collar. May drive in soft collar if car insurance allows.    No pushing, pulling, or lifting of objects greater than 5-10 pounds for 3 weeks. A gallon of milk is 8 pounds for reference. Then may lift up to 15 pounds.   No twisting or turning of neck while in hard collar  Then may move neck as much as soft collar allows.   May move neck normally when released from wearing collars.    BLOOD THINNERS:  May restart 3-5 days after surgery if taking blood thinners (Coumadin, warfarin, Lovenox, etc), or as directed by  prescribing provider.   Continue aspirin daily without restriction.            WOUND CARE:  Do not shower for 48 hours following surgery.   Keep surgical incision clean and dry until shower. Change with non-adherent dressing daily and as needed.  If no drainage, may cover or leave uncovered based on personal preference.   Do not remove Steri-Strips they will fall off on their own.   Any nylon sutures will be removed by provider at follow up visit.   Remove lidocaine patch after 12 hours.     DIET:  Continue on soft diet until swelling/difficulty swallowing has decreased.  Then may resume regular diet.   Continue Magnesium Citrate until bowel movement.   If you do not have a bowel movement in 72 hours with magnesium citrate, go to the Emergency Department.     REMEMBER:   It is normal to have post-surgical neck and back pain/spasms, even with small incisions.   Ice and/or heat may be helpful.    It is normal to have coming and going nerve pain in the arms as the nerve heals.   Nerve pain may be replaced initially with numbness and tingling/ pins and needles.    Week to week, post-surgical pain should become less often and less intense.   Continue medication as prescribed.     CALL PHYSICIAN:   Signs of infection at incision site:   progressive drainage or an unusual odor  increased redness and or swelling  increased pain and or tenderness    Excessive Bleeding:  Slow general oozing that completely soaks dressing  Fresh bright red bleeding or bleeding that will not stop.   It is normal to have a couple of days of drainage, but continues beyond 5 days of surgery.     EMERGENCY:  If you do not urinate in 8-10 hours, go to the Emergency Department.    FOR PRESCRIPTION REFILLS GIVE 48 HOURS NOTICE.  Follow-up 2-3 weeks from surgery for radiographs.

## 2025-07-09 NOTE — PROGRESS NOTES
"Edilson Cantu is a 74 y.o. male on day 2 of admission presenting with Cervical spondylosis with myelopathy.    Subjective   Patient was seen this morning on rounds.  Overall, doing well.  Has coming going numbness and tingling in the upper extremities.  Tolerable postsurgical posterior cervical pain.  Has been out of bed with PT/OT and ambulating with a walker.  Feels he is ready for discharge.       Objective     Physical Exam  Patient sitting up in chair comfortably on arrival.  Cervical collar in place.  Alert and oriented.  Cooperative and conversant.  Breathing nonlabored on nasal cannula.  4/5 strength of upper extremities bilaterally, 4/5 strength of lower extremities bilaterally.  Cervical collar removed and anterior and posterior cervical ERICA drains were discontinued and covered with sterile bandages.  Anterior and posterior incisional dressings appear clean and dry.  Last Recorded Vitals  Blood pressure 136/79, pulse 94, temperature 36.1 °C (97 °F), temperature source Temporal, resp. rate 18, height 1.778 m (5' 10\"), weight 67.6 kg (149 lb 0.5 oz), SpO2 94%.  Intake/Output last 3 Shifts:  I/O last 3 completed shifts:  In: 948.7 (14 mL/kg) [P.O.:360; I.V.:288.7 (4.3 mL/kg); IV Piggyback:300]  Out: 1887 (27.9 mL/kg) [Urine:1642 (0.7 mL/kg/hr); Drains:245]  Weight: 67.6 kg     Relevant Results      Scheduled medications  Scheduled Medications[1]  Continuous medications  Continuous Medications[2]  PRN medications  PRN Medications[3]  Results for orders placed or performed during the hospital encounter of 07/07/25 (from the past 24 hours)   POCT GLUCOSE   Result Value Ref Range    POCT Glucose 190 (H) 74 - 99 mg/dL   POCT GLUCOSE   Result Value Ref Range    POCT Glucose 144 (H) 74 - 99 mg/dL   POCT GLUCOSE   Result Value Ref Range    POCT Glucose 132 (H) 74 - 99 mg/dL   CBC   Result Value Ref Range    WBC 9.9 4.4 - 11.3 x10*3/uL    nRBC 0.0 0.0 - 0.0 /100 WBCs    RBC 3.76 (L) 4.50 - 5.90 x10*6/uL    Hemoglobin " 11.4 (L) 13.5 - 17.5 g/dL    Hematocrit 33.5 (L) 41.0 - 52.0 %    MCV 89 80 - 100 fL    MCH 30.3 26.0 - 34.0 pg    MCHC 34.0 32.0 - 36.0 g/dL    RDW 13.3 11.5 - 14.5 %    Platelets 212 150 - 450 x10*3/uL   Basic metabolic panel   Result Value Ref Range    Glucose 105 (H) 74 - 99 mg/dL    Sodium 142 136 - 145 mmol/L    Potassium 3.8 3.5 - 5.3 mmol/L    Chloride 111 (H) 98 - 107 mmol/L    Bicarbonate 26 21 - 32 mmol/L    Anion Gap 9 (L) 10 - 20 mmol/L    Urea Nitrogen 15 6 - 23 mg/dL    Creatinine 0.75 0.50 - 1.30 mg/dL    eGFR >90 >60 mL/min/1.73m*2    Calcium 9.3 8.6 - 10.3 mg/dL   POCT GLUCOSE   Result Value Ref Range    POCT Glucose 103 (H) 74 - 99 mg/dL     *Note: Due to a large number of results and/or encounters for the requested time period, some results have not been displayed. A complete set of results can be found in Results Review.                            Assessment & Plan  Spinal stenosis in cervical region    Cervical spondylosis with myelopathy    Spinal cord compression (Multi)    Postop day 2 status post C3-C6 cervical spine corpectomy; C2-C7 cervical fusion with allograft and anterior instrumentation; C2-T2 fusion with instrumentation  -Out of bed with PT/OT  -Perioperative antibiotics  -DVT prophylaxis; up in chair for meals, ambulate as tolerated, SCDs  -Continue current pain regimen, pain well-controlled  -Discharge planning to SNF awaiting pre-CERT  -Maintain cervical collar at all times  -ERICA drains discontinued today  -Follow-up in 2 to 3 weeks with Dr. Arteaga/PA    Following my evaluation of the patient we had a discussion about his preoperative rash which the patient consented to surgery with.  I expressed the importance of continuing postoperative antibiotics until follow-up appointment to decrease the possibility of contamination and wound infection.  We did discuss follow-up appointment with dermatology given the pustular nature of the rash.  Patient was understanding of this.    I  spent 30 minutes in the professional and overall care of this patient.      Benny Garcia PA-C  10:28 AM  07/09/25           [1] acetaminophen, 975 mg, oral, q8h  carbidopa-levodopa, 2 tablet, oral, TID  insulin lispro, 0-5 Units, subcutaneous, TID AC  oxygen, , inhalation, Continuous - 02/gases  polyethylene glycol, 17 g, oral, Daily  psyllium, 1 packet, oral, Daily  scopolamine, 1 patch, transdermal, q72h    [2] lactated Ringer's, 100 mL/hr    [3] PRN medications: bisacodyl, dextrose, dextrose, glucagon, glucagon, HYDROmorphone, ketorolac, methocarbamol, naloxone, ondansetron **OR** ondansetron, oxyCODONE, oxyCODONE, sodium phosphates

## 2025-07-09 NOTE — PROGRESS NOTES
Occupational Therapy    Occupational Therapy Treatment    Name: Edilson Cantu  MRN: [id number]  Department: [place] [id number]  Room: [address] [place] [address]  Date: 07/09/25  Time Calculation  Start Time: 0920  Stop Time: 0950  Time Calculation (min): 30 min    Assessment:  OT Assessment: Pt presents increased assistance for all ADLs/IADLs with decreased cognition and safety awareness  Prognosis: Good  Barriers to Discharge Home: Caregiver assistance, Cognition needs, Physical needs  Caregiver Assistance: Caregiver assistance needed per identified barriers - however, level of patient's required assistance exceeds assistance available at home  Cognition Needs: 24hr supervision for safety awareness needed  Physical Needs: 24hr mobility assistance needed, 24hr ADL assistance needed  End of Session Communication: Bedside nurse, PCT/NA/CTA  End of Session Patient Position: Alarm on, Up in chair  Plan:  Treatment Interventions: ADL retraining, Cognitive reorientation  OT Frequency: 3 times per week (during this acute inpatient hospitalization.)  OT Discharge Recommendations: Moderate intensity level of continued care (Based on current functional status and rehab potential, patient is anticipated to tolerate and benefit from 5 or more days per week of skilled rehabilitative therapy after discharge from this acute inpatient hospitalization.)  Equipment Recommended upon Discharge: Other (comment) (TBD)  OT Recommended Transfer Status: Assist of 2  OT - OK to Discharge: Yes (per POC)    Subjective     OT Visit Info:  OT Received On: 07/09/25  General:  General  Reason for Referral: Pt is a 74 y.o. male POD #1 C3-C6 Cervical Spine Corpectomy; C2-C7 Cervical Fusion with Allograft & Anterior Instrumentation; C2-T2 Fusion with Instrumentation  Prior to Session Communication: Bedside nurse  Patient Position Received: Up in chair, Alarm on  Preferred Learning Style: auditory, verbal, visual  General Comment: Pt hallucinating, follows commands 50% of the  time with repeition, presents poor motor planning, RN notified.  Precautions:  Post-Surgical Precautions: Spinal precautions  Braces Applied: Soft collar  Precautions Comment: ERICA drains, unable to state surgical precautions this day. Provided extesive education this day. Poor carryover of instruction. Continuous VC provided to implement spinal precautions throughout session.     Date/Time Vitals Session Patient Position Pulse Resp SpO2 BP MAP (mmHg)    07/09/25 0827 --  --  104  18  97 %  136/79  --     07/09/25 0920 During OT  --  94  --  94 %  --  --           Pain Assessment:  Pain Assessment  0-10 (Numeric) Pain Score: 4  Pain Type: Surgical pain  Pain Location: Neck    Objective   Cognition:  Orientation Level: Disoriented to time, Disoriented to situation  Following Commands: Follows one step commands with repetition  Safety Judgment: Decreased awareness of need for assistance  Problem Solving: Assistance required to generate solutions  Cognition Comments: Pt hallucinating, RN notified  Problem Solving: Exceptions to WFL  Insight: Severe  Impulsive: Moderately  Activities of Daily Living: Feeding  Feeding Level of Assistance: Moderate assistance    Grooming  Grooming Level of Assistance: Maximum assistance, Maximum verbal cues, Tactile cues, Visual aid cues  Grooming Where Assessed: Chair  Grooming Comments: Pt required assistance to complete face washing, tooth brushing, hair brushing, and donning/doffing glasses. Pt presents poor motor planning this day and required VC/TC for task initiation/completion/hygiene thoroughness.    UE Dressing  UE Dressing Level of Assistance: Maximum verbal cues, Maximum assistance, Visual aid cues  UE Dressing Where Assessed: Chair  UE Dressing Comments: assist to don/doff gown with VC for sequencing    LE Dressing  LE Dressing: No  LE Dressing Comments: Attempted LB dressing task, but unable to follow commands/complete with figure four dressing technique.    Outcome  Measures:  [facility] Daily Activity  Putting on and taking off regular lower body clothing: Total  Bathing (including washing, rinsing, drying): A lot  Putting on and taking off regular upper body clothing: A lot  Toileting, which includes using toilet, bedpan or urinal: A lot  Taking care of personal grooming such as brushing teeth: A lot  Eating Meals: A lot  Daily Activity - Total Score: 11      Education Documentation  Handouts, taught by SAULO Tellez at 7/9/2025 10:25 AM.  Learner: Patient  Readiness: Acceptance  Method: Explanation, Demonstration  Response: No Evidence of Learning  Comment: d/t cognitive status    Body Mechanics, taught by SAULO Tellez at 7/9/2025 10:25 AM.  Learner: Patient  Readiness: Acceptance  Method: Explanation, Demonstration  Response: No Evidence of Learning  Comment: d/t cognitive status    Precautions, taught by SAULO Tellez at 7/9/2025 10:25 AM.  Learner: Patient  Readiness: Acceptance  Method: Explanation, Demonstration  Response: No Evidence of Learning  Comment: d/t cognitive status    ADL Training, taught by SAULO Tellez at 7/9/2025 10:25 AM.  Learner: Patient  Readiness: Acceptance  Method: Explanation, Demonstration  Response: No Evidence of Learning  Comment: d/t cognitive status    Education Comments  No comments found.      Goals:  Encounter Problems       Encounter Problems (Active)       ADLs       Patient with complete upper body dressing with minimal assist  level of assistance donning and doffing all UE clothes with no adaptive equipment while supported sitting (Progressing)       Start:  07/08/25    Expected End:  07/22/25            Patient with complete lower body dressing with moderate assist level of assistance donning and doffing all LE clothes  with PRN adaptive equipment while supported sitting (Not Progressing)       Start:  07/08/25    Expected End:  07/22/25            Patient will complete daily grooming tasks  brushing teeth, shaving, and washing face/hair with minimal assist  level of assistance and PRN adaptive equipment while supported sitting. (Progressing)       Start:  07/08/25    Expected End:  07/22/25            Patient will complete toileting including hygiene clothing management/hygiene with minimal assist  level of assistance and raised toilet seat. (Not Progressing)       Start:  07/08/25    Expected End:  07/22/25               MOBILITY       Patient will perform Functional mobility min Household distances/Community Distances with minimal assist  level of assistance and least restrictive device in order to improve safety and functional mobility. (Not Progressing)       Start:  07/08/25    Expected End:  07/22/25               TRANSFERS       Patient will perform bed mobility minimal assist  level of assistance and bed rails in order to improve safety and independence with mobility (Not Progressing)       Start:  07/08/25    Expected End:  07/22/25            Patient will complete functional transfer  with least restrictive device with minimal assist  level of assistance. (Not Progressing)       Start:  07/08/25    Expected End:  07/22/25            Patient will complete sit to stand transfer with minimal assist  level of assistance and least restrictive device in order to improve safety and prepare for out of bed mobility. (Not Progressing)       Start:  07/08/25    Expected End:  07/22/25

## 2025-07-09 NOTE — PROGRESS NOTES
Physical Therapy    Physical Therapy Treatment    Patient Name: Edilson Cantu  MRN: [id number]  Department: [place] [address]  Room: [address] [place] [address]  Today's Date: 7/9/2025  Time Calculation  Start Time: 1050  Stop Time: 1118  Time Calculation (min): 28 min         Assessment/Plan   PT Assessment  Rehab Prognosis: Fair  Barriers to Discharge Home: Caregiver assistance, Cognition needs, Physical needs  Caregiver Assistance: Caregiver assistance needed per identified barriers - however, level of patient's required assistance exceeds assistance available at home  Cognition Needs: 24hr supervision for safety awareness needed, Medication and/or medical management daily assist needed, Insight of patient limited regarding functional ability/needs, Cognition-related high falls risk  End of Session Communication: Bedside nurse  Assessment Comment:  (pt demo fair - javy to activity)  End of Session Patient Position: Up in chair, Alarm on  PT Plan  Inpatient/Swing Bed or Outpatient: Inpatient  PT Plan  Treatment/Interventions: Transfer training, Gait training, Therapeutic activity  PT Plan: Ongoing PT  PT Frequency: Daily (During this acute inpatient hospitalization)  PT Discharge Recommendations: Moderate intensity level of continued care (Based on current functional status and rehab potential, patient is anticipated to tolerate and benefit from 5 or more days per week of skilled rehabilitative therapy after discharge from this acute inpatient hospitalization.)  Equipment Recommended upon Discharge: Other (comment) (TBD)  PT Recommended Transfer Status: Assist x2, Assistive device (2WW)  PT - OK to Discharge: Yes (per PT POC)    PT Visit Info:  PT Received On: 07/09/25     General Visit Information:   General  Reason for Referral: Pt is a 74 y.o. male s/p C3-C6 Cervical Spine Corpectomy; C2-C7 Cervical Fusion with Allograft & Anterior Instrumentation; C2-T2 Fusion with Instrumentation  Referred By: Benny Garcia PA-C  Family/Caregiver  Present: Yes (visitor in room)  Prior to Session Communication: Bedside nurse  Patient Position Received: Up in chair, Alarm on  Preferred Learning Style: auditory, verbal, visual  General Comment:  (pt agreeable to tx.)    Subjective   Precautions:  Precautions  Post-Surgical Precautions: Spinal precautions  Braces Applied: Soft collar            Objective   Pain:  Pain Assessment  Pain Assessment: 0-10  0-10 (Numeric) Pain Score: 2  Pain Type: Surgical pain  Pain Location: Neck  Cognition:  Cognition  Orientation Level: Disoriented to time, Disoriented to situation  Coordination:     Postural Control:     Extremity/Trunk Assessments:    Activity Tolerance:     Treatments:            Ambulation/Gait Training  Ambulation/Gait Training Performed: Yes  Ambulation/Gait Training 1  Surface 1: Level tile  Device 1: Rolling walker  Gait Support Devices: Gait belt  Assistance 1: Maximum assistance, Maximum verbal cues, Maximum tactile cues  Quality of Gait 1: Narrow base of support, Shuffling gait, Forward flexed posture  Comments/Distance (ft) 1: 3-4 short steps fwd/bwd (pt with heavy retro leaning balance, difficulty advancing FWW and sequencing, unsteady)  Transfers  Transfer: Yes  Transfer 1  Transfer From 1: Sit to, Stand to  Technique 1: Sit to stand, Stand to sit  Transfer Device 1: Walker, Gait belt  Transfer Level of Assistance 1: Maximum assistance, Maximum verbal cues, Maximum tactile cues  Trials/Comments 1: 5 trials (pt req increased cues for proper hand placement and sequencing, unsteayd,heavy retro leaning,fair - staitc standing balance.)    Outcome Measures:  [facility] Basic Mobility  Turning from your back to your side while in a flat bed without using bedrails: A lot  Moving from lying on your back to sitting on the side of a flat bed without using bedrails: A lot  Moving to and from bed to chair (including a wheelchair): A lot  Standing up from a chair using your arms (e.g. wheelchair or bedside chair): A  lot  To walk in hospital room: A lot  Climbing 3-5 steps with railing: Total  Basic Mobility - Total Score: 11    Education Documentation  Handouts, taught by Tyree Birmingham PTA at 7/9/2025  1:48 PM.  Learner: Patient  Readiness: Acceptance  Method: Explanation  Response: Needs Reinforcement    Precautions, taught by Tyree Birmingham PTA at 7/9/2025  1:48 PM.  Learner: Patient  Readiness: Acceptance  Method: Explanation  Response: Needs Reinforcement    Body Mechanics, taught by Tyree Birmingham PTA at 7/9/2025  1:48 PM.  Learner: Patient  Readiness: Acceptance  Method: Explanation  Response: Needs Reinforcement    Home Exercise Program, taught by Tyree Birmingham PTA at 7/9/2025  1:48 PM.  Learner: Patient  Readiness: Acceptance  Method: Explanation  Response: Needs Reinforcement    Mobility Training, taught by Tyree Birmingham PTA at 7/9/2025  1:48 PM.  Learner: Patient  Readiness: Acceptance  Method: Explanation  Response: Needs Reinforcement    Education Comments  No comments found.        OP EDUCATION:       Encounter Problems       Encounter Problems (Active)       Balance       STG - Maintains static standing balance with Min A, bilateral upper extremity support, 2WW, without sway, and stable COM x 8 minutes (Progressing)       Start:  07/08/25    Expected End:  07/22/25       INTERVENTIONS:1. Practice standing with minimal support.2. Educate patient about standing tolerance.3. Educate patient about independence with gait, transfers, and ADL's.4. Educate patient about use of assistive device.5. Educate patient about self-directed care.            Balance       STG - Maintains static sitting balance with unilateral upper extremity support and SBA x 8 minutes       Start:  07/08/25    Expected End:  07/22/25       INTERVENTIONS:1. Practice sitting on the edge of a bed/mat with minimal support.2. Educate patient about maintining total hip precautions while maintaining balance.3.  Educate patient about pressure relief.4. Educate patient about use of assistive device.            Mobility       LTG - Patient will safely navigate 3 steps with bilateral hand rails and Mod A (Progressing)       Start:  07/08/25    Expected End:  07/22/25            STG - Patient will safely ambulate x30' x3 with Mod A, 2WW, and improved mechanics. (Progressing)       Start:  07/08/25    Expected End:  07/22/25               PT Transfers       STG - Transfer safely from bed to chair with Min A and 2WW. (Progressing)       Start:  07/08/25    Expected End:  07/22/25            STG - Patient to transfer to and from sit to supine using log roll method with Min A and elevated HOB. (Progressing)       Start:  07/08/25    Expected End:  07/22/25            STG - Patient will safely transfer sit to and from stand with Mod A and 2WW. (Progressing)       Start:  07/08/25    Expected End:  07/22/25               Safety       Pt will follow spinal precautions during all functional mobility, 100% of the time, without cueing. (Progressing)       Start:  07/08/25    Expected End:  07/22/25

## 2025-07-09 NOTE — PROGRESS NOTES
07/09/25 1223   Discharge Planning   Home or Post Acute Services Post acute facilities (Rehab/SNF/etc)   Type of Post Acute Facility Services Skilled nursing   Expected Discharge Disposition SNF   Does the patient need discharge transport arranged? Yes   [facility] coordination needed? Yes   Has discharge transport been arranged? No   What day is the transport expected? 07/10/25   Patient Choice   Provider Choice list and CMS website (https://medicare.gov/care-compare#search) for post-acute Quality and Resource Measure Data were provided and reviewed with: Family   Patient / Family choosing to utilize agency / facility established prior to hospitalization No   Stroke Family Assessment   Stroke Family Assessment Needed No   Intensity of Service   Intensity of Service 0-30 min     Met with patient and his wife to discuss SNF options.  Patient's spouse would like referrals sent to:  Would you send referrals to:  1-[facility])  2- [facility]  3-[facility]  4-[facility]  5- [facility] send referrals.  Patient will need auth.  Glenna Carr RN     7/9/25 @ 1426  [facility] has accepted the patient/  Requested [facility] team submit for auth.  ADOD: tomorrow  Glenna Carr RN

## 2025-07-09 NOTE — CARE PLAN
The patient's goals for the shift include      The clinical goals for the shift include safety      Problem: Skin  Goal: Decreased wound size/increased tissue granulation at next dressing change  Outcome: Progressing  Goal: Participates in plan/prevention/treatment measures  Outcome: Progressing  Goal: Prevent/manage excess moisture  Outcome: Progressing  Goal: Prevent/minimize sheer/friction injuries  Outcome: Progressing  Goal: Promote/optimize nutrition  Outcome: Progressing  Goal: Promote skin healing  Outcome: Progressing     Problem: Pain - Adult  Goal: Verbalizes/displays adequate comfort level or baseline comfort level  Outcome: Progressing

## 2025-07-09 NOTE — NURSING NOTE
Interdisciplinary team present: NP, PT, NM, CC, SW, Orthopedic Coordinator, and bedside RN.  Pain - controlled  Nausea - none  Discharge barrier - needs SNF choices, ERICA x2  Discharge plan - SNF  Discharge date/time - pending

## 2025-07-10 VITALS
RESPIRATION RATE: 18 BRPM | SYSTOLIC BLOOD PRESSURE: 125 MMHG | BODY MASS INDEX: 21.34 KG/M2 | WEIGHT: 149.03 LBS | TEMPERATURE: 97.7 F | HEIGHT: 70 IN | DIASTOLIC BLOOD PRESSURE: 69 MMHG | OXYGEN SATURATION: 97 % | HEART RATE: 72 BPM

## 2025-07-10 LAB
ANION GAP SERPL CALC-SCNC: 11 MMOL/L (ref 10–20)
BUN SERPL-MCNC: 16 MG/DL (ref 6–23)
CALCIUM SERPL-MCNC: 9 MG/DL (ref 8.6–10.3)
CHLORIDE SERPL-SCNC: 111 MMOL/L (ref 98–107)
CO2 SERPL-SCNC: 25 MMOL/L (ref 21–32)
CREAT SERPL-MCNC: 0.75 MG/DL (ref 0.5–1.3)
EGFRCR SERPLBLD CKD-EPI 2021: >90 ML/MIN/1.73M*2
ERYTHROCYTE [DISTWIDTH] IN BLOOD BY AUTOMATED COUNT: 13.4 % (ref 11.5–14.5)
GLUCOSE BLD MANUAL STRIP-MCNC: 103 MG/DL (ref 74–99)
GLUCOSE BLD MANUAL STRIP-MCNC: 91 MG/DL (ref 74–99)
GLUCOSE BLD MANUAL STRIP-MCNC: 98 MG/DL (ref 74–99)
GLUCOSE SERPL-MCNC: 86 MG/DL (ref 74–99)
HCT VFR BLD AUTO: 32.9 % (ref 41–52)
HGB BLD-MCNC: 10.5 G/DL (ref 13.5–17.5)
MCH RBC QN AUTO: 30.3 PG (ref 26–34)
MCHC RBC AUTO-ENTMCNC: 31.9 G/DL (ref 32–36)
MCV RBC AUTO: 95 FL (ref 80–100)
NRBC BLD-RTO: 0 /100 WBCS (ref 0–0)
PLATELET # BLD AUTO: 185 X10*3/UL (ref 150–450)
POTASSIUM SERPL-SCNC: 4.1 MMOL/L (ref 3.5–5.3)
RBC # BLD AUTO: 3.47 X10*6/UL (ref 4.5–5.9)
SODIUM SERPL-SCNC: 143 MMOL/L (ref 136–145)
WBC # BLD AUTO: 8.3 X10*3/UL (ref 4.4–11.3)

## 2025-07-10 PROCEDURE — 2500000001 HC RX 250 WO HCPCS SELF ADMINISTERED DRUGS (ALT 637 FOR MEDICARE OP)

## 2025-07-10 PROCEDURE — 82947 ASSAY GLUCOSE BLOOD QUANT: CPT

## 2025-07-10 PROCEDURE — 85027 COMPLETE CBC AUTOMATED: CPT

## 2025-07-10 PROCEDURE — 36415 COLL VENOUS BLD VENIPUNCTURE: CPT

## 2025-07-10 PROCEDURE — 97530 THERAPEUTIC ACTIVITIES: CPT | Mod: GP,CQ | Performed by: PHYSICAL THERAPY ASSISTANT

## 2025-07-10 PROCEDURE — 80048 BASIC METABOLIC PNL TOTAL CA: CPT

## 2025-07-10 PROCEDURE — 2500000004 HC RX 250 GENERAL PHARMACY W/ HCPCS (ALT 636 FOR OP/ED)

## 2025-07-10 RX ADMIN — CARBIDOPA AND LEVODOPA 2 TABLET: 25; 100 TABLET ORAL at 09:21

## 2025-07-10 RX ADMIN — ACETAMINOPHEN 975 MG: 325 TABLET ORAL at 16:47

## 2025-07-10 RX ADMIN — CARBIDOPA AND LEVODOPA 2 TABLET: 25; 100 TABLET ORAL at 18:40

## 2025-07-10 RX ADMIN — CARBIDOPA AND LEVODOPA 2 TABLET: 25; 100 TABLET ORAL at 13:08

## 2025-07-10 RX ADMIN — PSYLLIUM HUSK 1 PACKET: 3.4 POWDER ORAL at 09:17

## 2025-07-10 RX ADMIN — POLYETHYLENE GLYCOL 3350 17 G: 17 POWDER, FOR SOLUTION ORAL at 09:17

## 2025-07-10 RX ADMIN — ACETAMINOPHEN 975 MG: 325 TABLET ORAL at 05:41

## 2025-07-10 ASSESSMENT — COGNITIVE AND FUNCTIONAL STATUS - GENERAL
WALKING IN HOSPITAL ROOM: A LOT
MOVING FROM LYING ON BACK TO SITTING ON SIDE OF FLAT BED WITH BEDRAILS: A LOT
DRESSING REGULAR LOWER BODY CLOTHING: A LITTLE
MOVING TO AND FROM BED TO CHAIR: A LOT
STANDING UP FROM CHAIR USING ARMS: A LOT
CLIMB 3 TO 5 STEPS WITH RAILING: A LOT
PERSONAL GROOMING: A LITTLE
HELP NEEDED FOR BATHING: A LITTLE
STANDING UP FROM CHAIR USING ARMS: A LOT
MOVING FROM LYING ON BACK TO SITTING ON SIDE OF FLAT BED WITH BEDRAILS: A LOT
CLIMB 3 TO 5 STEPS WITH RAILING: TOTAL
WALKING IN HOSPITAL ROOM: A LOT
MOBILITY SCORE: 11
MOBILITY SCORE: 12
TOILETING: A LOT
DRESSING REGULAR UPPER BODY CLOTHING: A LITTLE
DAILY ACTIVITIY SCORE: 18
TURNING FROM BACK TO SIDE WHILE IN FLAT BAD: A LOT
MOVING TO AND FROM BED TO CHAIR: A LOT
TURNING FROM BACK TO SIDE WHILE IN FLAT BAD: A LOT

## 2025-07-10 ASSESSMENT — PAIN - FUNCTIONAL ASSESSMENT: PAIN_FUNCTIONAL_ASSESSMENT: 0-10

## 2025-07-10 ASSESSMENT — PAIN SCALES - GENERAL: PAINLEVEL_OUTOF10: 0 - NO PAIN

## 2025-07-10 NOTE — CARE PLAN
The patient's goals for the shift include      The clinical goals for the shift include maintain pt safety    Problem: Skin  Goal: Decreased wound size/increased tissue granulation at next dressing change  Flowsheets (Taken 7/9/2025 2000)  Decreased wound size/increased tissue granulation at next dressing change: Promote sleep for wound healing  Goal: Participates in plan/prevention/treatment measures  Flowsheets (Taken 7/9/2025 2000)  Participates in plan/prevention/treatment measures: Elevate heels  Goal: Prevent/manage excess moisture  Flowsheets (Taken 7/9/2025 2000)  Prevent/manage excess moisture:   Follow provider orders for dressing changes   Moisturize dry skin   Cleanse incontinence/protect with barrier cream  Goal: Prevent/minimize sheer/friction injuries  Flowsheets (Taken 7/9/2025 2000)  Prevent/minimize sheer/friction injuries:   HOB 30 degrees or less   Turn/reposition every 2 hours/use positioning/transfer devices   Use pull sheet  Goal: Promote/optimize nutrition  Flowsheets (Taken 7/9/2025 2000)  Promote/optimize nutrition:   Assist with feeding   Monitor/record intake including meals  Goal: Promote skin healing  Flowsheets (Taken 7/9/2025 2000)  Promote skin healing:   Turn/reposition every 2 hours/use positioning/transfer devices   Assess skin/pad under line(s)/device(s)   Ensure correct size (line/device) and apply per  instructions   Rotate device position/do not position patient on device

## 2025-07-10 NOTE — PROGRESS NOTES
Care Coordinator Note:  TCC updated wife on dc plans.     Plan: Patient POD #3 ant/posterior cervical decomp and fusion, with c-collar. PT OT rec MOD. Plan for SNF. Med ready     Status: inpatinet  Payor: [employer] medicare  Disposition: [facility] SNF- auth started 7/9  Barrier: AUTH  ADOD: MED READY will dc today if we get auth.   1330-  Precert Status: Approved. Certification Number: [id number]. Dates: 07/09/2025 - 07/15/2025. (M47.12) Hospital team will reach out regarding further discharge details.   MD notified to update med rec then can request transport.   Facility aware.     [facility]alfonso GarciaJames [facility]      07/10/25 1249   Discharge Planning   Expected Discharge Disposition SNF  ([facility]- auth pending since 7/9)   Patient Choice   Provider Choice list and CMS website (https://medicare.gov/care-compare#search) for post-acute Quality and Resource Measure Data were provided and reviewed with: Family;Patient   Patient / Family choosing to utilize agency / facility established prior to hospitalization Yes   Intensity of Service   Intensity of Service 0-30 min

## 2025-07-10 NOTE — NURSING NOTE
Interdisciplinary team present: NP, PT, NM, CC, SW, Orthopedic Coordinator, and bedside RN.  Pain - controlled  Nausea - none  Discharge barrier - auth for snf  Discharge plan - [facility]  Discharge date/time -  pending auth

## 2025-07-10 NOTE — DISCHARGE SUMMARY
Discharge Diagnosis  Cervical spondylosis with myelopathy    Issues Requiring Follow-Up  Patient should follow up at scheduled 2-3 week post up visit unless:  Uncontrolled bleeding from incision site  Intractable pain in extremities  Signs and symptoms of infection  Inability to urinate/have a bowel movement      Test Results Pending At Discharge  Pending Labs       Order Current Status    Protime-INR Collected (07/07/25 0906)            Hospital Course   Patient was taken to the OR on 7/7/2025 for C3-C6 cervical spine corpectomy; C2-C7 cervical fusion with allograft and anterior instrumentation; C2-T2 fusion with instrumentation with Dr. Arteaga. Procedure went as planned and without complications and patient was transferred to PACU and in stable condition was transferred to the floor.     Hospital stay was somewhat complicated by bouts of confusion on 7/9/2025 per the nursing staff.  This was not implicitly obvious on my examination that day.  On 7/10/2025, after holding narcotic and muscle relaxer medications, his cognition seem to have improved.  This was denoted in Dr. Arteaga's note.  Of note, the patient had no new neurologic deficits that deviated from his baseline which would warrant a consultation to neurology.    Patient has been working with PT and OT and improving. On discharge date patient was cleared by PT and orthopedic team and was determined safe for discharge. Daily discussion was had with the patient, nursing staff, orthopedic team, and family members if present. All questions were answered to the patient's satisfaction.      Visit Vitals  /76   Pulse 85   Temp 36.6 °C (97.9 °F) (Temporal)   Resp 19     Vitals:    07/07/25 0907   Weight: 67.6 kg (149 lb 0.5 oz)       Immunization History   Administered Date(s) Administered    Flu vaccine, quadrivalent, high-dose, preservative free, age 65y+ (FLUZONE) 09/13/2022    Pfizer COVID-19 vaccine, 12 years and older, (30mcg/0.3mL) (Comirnaty)  11/13/2023, 10/31/2024    Pfizer COVID-19 vaccine, bivalent, age 12 years and older (30 mcg/0.3 mL) 09/13/2022    Pfizer Purple Cap SARS-CoV-2 02/11/2021, 03/04/2021, 10/15/2021    Pneumococcal conjugate vaccine, 13-valent (PREVNAR 13) 11/21/2016, 04/06/2018    Pneumococcal polysaccharide vaccine, 23-valent, age 2 years and older (PNEUMOVAX 23) 08/25/2020    Zoster, live 06/21/2013       Results        Pertinent Physical Exam At Time of Discharge  Physical Exam    Home Medications     Medication List      START taking these medications     acetaminophen 500 mg tablet; Commonly known as: Tylenol; Take 2 tablets   (1,000 mg) by mouth every 6 hours if needed for mild pain (1 - 3) for up   to 7 days.   cyclobenzaprine 10 mg tablet; Commonly known as: Flexeril; Take 1 tablet   (10 mg) by mouth 3 times a day as needed for muscle spasms for up to 10   days.   docusate sodium 100 mg capsule; Commonly known as: Colace; Take 1   capsule (100 mg) by mouth 3 times a day as needed for constipation for up   to 7 days.   ketorolac 10 mg tablet; Commonly known as: Toradol; Take 1 tablet (10   mg) by mouth every 6 hours if needed for moderate pain (4 - 6) for up to 5   days.   oxyCODONE 5 mg immediate release tablet; Commonly known as: Roxicodone;   Take 1 tablet (5 mg) by mouth every 6 hours if needed for severe pain (7 -   10) for up to 7 days.   sulfamethoxazole-trimethoprim 800-160 mg tablet; Commonly known as:   Bactrim DS; Take 1 tablet by mouth 2 times a day.     CONTINUE taking these medications     bisacodyl 5 mg EC tablet; Commonly known as: Dulcolax   carbidopa-levodopa  mg disintegrating tablet; Commonly known as:   Parcopa   diclofenac sodium 1 % kit; Apply 1 Application topically 4 times a day   as needed (to areas of pain).   polyethylene glycol 17 gram packet; Commonly known as: Glycolax, Miralax     STOP taking these medications     Afrin (oxymetazoline) 0.05 % nasal spray; Generic drug: oxymetazoline    chlorhexidine 4 % external liquid; Commonly known as: Hibiclens   ibuprofen 200 mg tablet       Outpatient Follow-Up  Future Appointments   Date Time Provider Department Center   7/25/2025 11:00 AM Benny Garcia PA-C [id number]       Benny Garcia PA-C  3:53 PM  07/10/25

## 2025-07-10 NOTE — PROGRESS NOTES
INITIAL OFFICE VISIT      Chloé Awad  2018             There were no vitals taken for this visit. Weight:     NUTRITION: Hayder's diet should ideally be breast milk. If you are bottle feeding, please do not have Hayder lying down while he is being fed. This increases the risk of ear infections. Mother's milk or formula provides all the nutrition that babies need at this age. Because we have less sunlight in [place] the [place], infants whose only source of nutrition is mother's milk should have nursing baby vitamins with Vitamin D or Vitamin A, D and C (available without prescription at the drug store) most days of the week. DEVELOPMENT/BEHAVIOR:  Hold and talk to [place]. Research has shown that babies are not spoiled by being held early in life. In fact, babies who are held at an early age cry less when they are older. Diaries kept by mothers of  babies indicate that most babies, beginning at two weeks of age, have an evening fussy spell. This usually occurs between 4:00 PM and midnight. This crying usually reaches a peak at six weeks of age. It requires a lot of rocking, walking, and patience. Call our office if the crying goes on for more than 3 hours per day or if you are afraid that the baby is ill. SAFETY/ACCIDENT PREVENTION:  Â· An approved rear facing car seat in the back seat of the car is required by [employer] until [place] is two years old. When you buy a car seat, please check for safety problems for your particular model by calling the [employer] Safety Hotline at 1-410.531.2602. To find a certified car seat  who can determine if your car seat is installed properly, call 0-413-SEAT CHECK. Â· Crib:  Bars on the crib must be no more than 2 5/8 inches apart. The mattress must fit snugly and the distance from the mattress to the top rail should be 26 inches or more. The crib should be painted with lead-free paint. Â· Suffocation:   To "Edilson Cantu is a 74 y.o. male on day 3 of admission presenting with Cervical spondylosis with myelopathy.    Subjective   Patient doing well this morning.  No significant pain.  Patient was completely aware of his disorientation over the last couple of days.  This morning, he is alert, oriented to person place and day, and was going on at length about his \"Edilson 2.0\" experience the other day.  At this point, he is ready and prepared to be discharged to skilled nursing       Objective     Physical Exam    Last Recorded Vitals  Blood pressure 157/84, pulse 92, temperature 36.7 °C (98 °F), temperature source Temporal, resp. rate 18, height 1.778 m (5' 10\"), weight 67.6 kg (149 lb 0.5 oz), SpO2 95%.  Intake/Output last 3 Shifts:  I/O last 3 completed shifts:  In: - (0 mL/kg)   Out: 100 (1.5 mL/kg) [Urine:100 (0 mL/kg/hr)]  Weight: 67.6 kg     Relevant Results      Scheduled medications  Scheduled Medications[1]  Continuous medications  Continuous Medications[2]  PRN medications  PRN Medications[3]  Results for orders placed or performed during the hospital encounter of 07/07/25 (from the past 24 hours)   POCT GLUCOSE   Result Value Ref Range    POCT Glucose 103 (H) 74 - 99 mg/dL   POCT GLUCOSE   Result Value Ref Range    POCT Glucose 89 74 - 99 mg/dL   POCT GLUCOSE   Result Value Ref Range    POCT Glucose 149 (H) 74 - 99 mg/dL   POCT GLUCOSE   Result Value Ref Range    POCT Glucose 109 (H) 74 - 99 mg/dL   CBC   Result Value Ref Range    WBC 8.3 4.4 - 11.3 x10*3/uL    nRBC 0.0 0.0 - 0.0 /100 WBCs    RBC 3.47 (L) 4.50 - 5.90 x10*6/uL    Hemoglobin 10.5 (L) 13.5 - 17.5 g/dL    Hematocrit 32.9 (L) 41.0 - 52.0 %    MCV 95 80 - 100 fL    MCH 30.3 26.0 - 34.0 pg    MCHC 31.9 (L) 32.0 - 36.0 g/dL    RDW 13.4 11.5 - 14.5 %    Platelets 185 150 - 450 x10*3/uL     *Note: Due to a large number of results and/or encounters for the requested time period, some results have not been displayed. A complete set of results can be found " avoid accidental suffocation, never allow Hayder to sleep on a soft surface such as a waterbed, sheepskin, pillow, or down comforter. Â· Burns:  Adjust your hot-water heater to 120-130 degrees F, or use the low setting. Most water heaters are set at 140 degrees, which will burn a child's skin within four seconds. You will also save a lot of money on your gas or electric bill by turning your water heater down. Â· Smoke Detectors:  Make sure that detectors are near bedrooms and that the batteries are replaced at least twice a year in spring and fall. GENERAL ADVICE:  Â· Sibling Rivalry:  Brothers and sisters under 11years of age will occasionally have jealous feelings toward the new baby. They will require supervision to learn to handle him gently. Setting time aside during Hayder's nap for older brothers and sisters often helps. Many parents have found that involving older children in caring for the baby helps them to adjust.  The older children should not help with feedings. Â· Smoking:  Children exposed to tobacco smoke have more ear infections and pneumonia. The risk of Sudden Infant Death Syndrome (SIDS) is also increased. If you smoke, please quit. If you cannot quit, smoke outside. Do not smoke near your baby, and do not let others smoke near your baby. Do not smoke in the car. Â· Sleeping Arrangements:  Position Hayder on his back when sleeping. This position has been shown to decrease the risk of SIDS. Many babies will come to prefer this position. Never sleep in bed with your infant. Try to give him some time while awake to spend on his stomach to promote arm strength and neck strength. Movies of sleeping babies show that they wake up several times each night. While many parents are anxious to keep a close eye on their new baby, most find that this pattern of frequent waking is exhausting to the parents.  A nearby room with an open door will allow you to respond to distress calls and in Results Review.                            Assessment & Plan  Spinal stenosis in cervical region    Cervical spondylosis with myelopathy    Spinal cord compression (Multi)    Postoperative day 3 status post anterior posterior cervical decompression and fusion  Discharged today to skilled nursing facility pending insurance precertification  Follow-up in 2 to 3 weeks  Maintain cervical collar until follow-up  Patient instructed in meticulous wound care while at SNF            Ruben Arteaga MD           [1] acetaminophen, 975 mg, oral, q8h  carbidopa-levodopa, 2 tablet, oral, TID  insulin lispro, 0-5 Units, subcutaneous, TID AC  oxygen, , inhalation, Continuous - 02/gases  polyethylene glycol, 17 g, oral, Daily  psyllium, 1 packet, oral, Daily  scopolamine, 1 patch, transdermal, q72h    [2]    [3] PRN medications: bisacodyl, dextrose, dextrose, glucagon, glucagon, HYDROmorphone, ketorolac, methocarbamol, naloxone, ondansetron **OR** ondansetron, oxyCODONE, oxyCODONE, sodium phosphates     yet preserve the strength you need to care for [facility]. MEDICAL ADVICE:  Â· Body Temperature:  Normal rectal temperature is up to 99.6 degrees F. Call your physician if [place]âs temperature is over 100.4 degrees, even if this occurs after office hours. Â· Vaporizer:  A cool mist vaporizer may help with stuffy  noses and colds. Â· Upper Respiratory Illnesses:  Use a bulb syringe to remove loose nasal secretions from [place]'s nose. If secretions are thick and difficult to extract, use nasal saline drops made from 1/4 tsp. salt added to one cup of warm water (make fresh daily). Put 2-3 drops in one nostril at a time and then suction. Â· Constipation:  Frequency of bowel movements may vary from once every feeding to once every three to four days. It is normal for babies to strain and turn a little red in the face with bowel movements, as long as the bowel movements are soft. If you feel [facility] is very uncomfortable, discuss this with your doctor. Bowel movements can be green or yellow. Blood in the bowel movements may be normal early in nursing, but you should check with your doctor if you see any. Â· Gas:  Abdominal pain from gas is common in infants. Simethicone infant drops can sometimes help relieve some of the discomfort. This product is sold under many names, but simethicone should be the only active ingredient. Use 0.3 cc or ml every 6 hours as needed for gas (maximum 4 times per day). The gas is due to swallowed air, which may occur with babies who make a poor seal with their mouths or who are hard to burp. Â· Maternal Medicines:  If you are nursing, please advise your pediatrician of any medicine that you are taking, including herbal medicines and supplements. IMMUNIZATIONS:  Please be sure to read the vaccine pamphlets. [facility] will usually receive the following vaccines at the two-month check-up: DaPT, Hep B, Prevnar, Hib, Polio and Rotavirus.    Combination vaccines are preferred whenever possible. Check with your pediatrician as to the specific combination vaccines used at their clinic. NEXT VISIT: TWO MONTHS OF AGE    Thank you for entrusting your care to [facility]

## 2025-07-10 NOTE — PROGRESS NOTES
7/10/2025 4:11 PM Wife informed that patient will be discharged at 7:50 PM this evening to [facility]. [place] ALEMAN

## 2025-07-10 NOTE — PROGRESS NOTES
Physical Therapy    Physical Therapy Treatment    Patient Name: Edilson Cantu  MRN: [id number]  Department: [place] [id number]  Room: [address] [place] [address]  Today's Date: 7/10/2025  Time Calculation  Start Time: 1036  Stop Time: 1100  Time Calculation (min): 24 min         Assessment/Plan   PT Assessment  Rehab Prognosis: Fair  Barriers to Discharge Home: Caregiver assistance, Cognition needs, Physical needs  Caregiver Assistance: Caregiver assistance needed per identified barriers - however, level of patient's required assistance exceeds assistance available at home  Cognition Needs: 24hr supervision for safety awareness needed, Medication and/or medical management daily assist needed, Insight of patient limited regarding functional ability/needs, Cognition-related high falls risk  End of Session Communication: Bedside nurse  Assessment Comment:  (pt demo fair - javy to tx.)  End of Session Patient Position: Up in chair, Alarm on  PT Plan  Inpatient/Swing Bed or Outpatient: Inpatient  PT Plan  Treatment/Interventions: Bed mobility, Gait training, Transfer training, Therapeutic activity  PT Plan: Ongoing PT  PT Frequency: Daily (During this acute inpatient hospitalization)  PT Discharge Recommendations: Moderate intensity level of continued care (Based on current functional status and rehab potential, patient is anticipated to tolerate and benefit from 5 or more days per week of skilled rehabilitative therapy after discharge from this acute inpatient hospitalization.)  Equipment Recommended upon Discharge: Other (comment) (TBD)  PT Recommended Transfer Status: Assist x2, Assistive device (2WW)  PT - OK to Discharge: Yes (per PT POC)    PT Visit Info:  PT Received On: 07/10/25     General Visit Information:   General  Reason for Referral: Pt is a 74 y.o. male s/p C3-C6 Cervical Spine Corpectomy; C2-C7 Cervical Fusion with Allograft & Anterior Instrumentation; C2-T2 Fusion with Instrumentation  Referred By: Benny Garcia PA-C  Prior to  Session Communication: Bedside nurse  Patient Position Received: Bed, 3 rail up, Alarm on  Preferred Learning Style: auditory, verbal, visual  General Comment:  (pt agreeable to tx.)    Subjective   Precautions:  Precautions  Medical Precautions: Fall precautions  Post-Surgical Precautions: Spinal precautions  Braces Applied: Soft collar            Objective   Pain:  Pain Assessment  Pain Assessment: 0-10  0-10 (Numeric) Pain Score: 0 - No pain  Cognition:  Cognition  Orientation Level: Disoriented to situation  Coordination:     Postural Control:     Extremity/Trunk Assessments:    Activity Tolerance:     Treatments:       Bed Mobility  Bed Mobility: Yes  Bed Mobility 1  Bed Mobility 1: Supine to sitting, Log roll  Level of Assistance 1: Moderate assistance, Moderate verbal cues, Moderate tactile cues  Bed Mobility Comments 1:  (pt req increased cues for proper sequencing and hand placement , fair balance.)  Bed Mobility 2  Bed Mobility  2: Scooting  Level of Assistance 2: Moderate assistance, Moderate verbal cues, Moderate tactile cues  Bed Mobility Comments 2:  (pt req increased cues for proper hand placement and sequencing.)    Ambulation/Gait Training  Ambulation/Gait Training Performed: Yes  Ambulation/Gait Training 1  Surface 1: Level tile  Device 1: Rolling walker  Gait Support Devices: Gait belt  Assistance 1: Maximum assistance, Maximum verbal cues, Maximum tactile cues  Quality of Gait 1: Narrow base of support, Shuffling gait, Forward flexed posture  Comments/Distance (ft) 1: 3-4 short side steps (pt very unsteady,retro leaning, fair endurance.)  Transfers  Transfer: Yes  Transfer 1  Transfer From 1: Sit to, Stand to  Technique 1: Sit to stand, Stand to sit  Transfer Device 1: Walker, Gait belt  Transfer Level of Assistance 1: Maximum assistance, Maximum verbal cues, Maximum tactile cues  Trials/Comments 1: 3 (pt req increased cues for proper hand placement and sequencing.)  Transfers 2  Transfer From 2:  Bed to  Transfer to 2: Chair with drop arm  Technique 2: Squat pivot  Transfer Device 2: Gait belt  Transfer Level of Assistance 2: Maximum assistance, Moderate tactile cues, Moderate verbal cues  Trials/Comments 2:  (pt req icreased cues for proper sequencing.)    Outcome Measures:  [facility] Basic Mobility  Turning from your back to your side while in a flat bed without using bedrails: A lot  Moving from lying on your back to sitting on the side of a flat bed without using bedrails: A lot  Moving to and from bed to chair (including a wheelchair): A lot  Standing up from a chair using your arms (e.g. wheelchair or bedside chair): A lot  To walk in hospital room: A lot  Climbing 3-5 steps with railing: Total  Basic Mobility - Total Score: 11    Education Documentation  Handouts, taught by Tyree Birmingham PTA at 7/10/2025  1:40 PM.  Learner: Patient  Readiness: Acceptance  Method: Explanation  Response: Needs Reinforcement    Precautions, taught by Tyree Birmingham PTA at 7/10/2025  1:40 PM.  Learner: Patient  Readiness: Acceptance  Method: Explanation  Response: Needs Reinforcement    Body Mechanics, taught by Tyree Birmingham PTA at 7/10/2025  1:40 PM.  Learner: Patient  Readiness: Acceptance  Method: Explanation  Response: Needs Reinforcement    Home Exercise Program, taught by Tyree Birmingham PTA at 7/10/2025  1:40 PM.  Learner: Patient  Readiness: Acceptance  Method: Explanation  Response: Needs Reinforcement    Mobility Training, taught by Tyree Birmingham PTA at 7/10/2025  1:40 PM.  Learner: Patient  Readiness: Acceptance  Method: Explanation  Response: Needs Reinforcement    Education Comments  No comments found.        OP EDUCATION:       Encounter Problems       Encounter Problems (Active)       Balance       STG - Maintains static standing balance with Min A, bilateral upper extremity support, 2WW, without sway, and stable COM x 8 minutes (Progressing)       Start:  07/08/25     Expected End:  07/22/25       INTERVENTIONS:1. Practice standing with minimal support.2. Educate patient about standing tolerance.3. Educate patient about independence with gait, transfers, and ADL's.4. Educate patient about use of assistive device.5. Educate patient about self-directed care.            Balance       STG - Maintains static sitting balance with unilateral upper extremity support and SBA x 8 minutes (Progressing)       Start:  07/08/25    Expected End:  07/22/25       INTERVENTIONS:1. Practice sitting on the edge of a bed/mat with minimal support.2. Educate patient about maintining total hip precautions while maintaining balance.3. Educate patient about pressure relief.4. Educate patient about use of assistive device.            Mobility       LTG - Patient will safely navigate 3 steps with bilateral hand rails and Mod A (Progressing)       Start:  07/08/25    Expected End:  07/22/25            STG - Patient will safely ambulate x30' x3 with Mod A, 2WW, and improved mechanics. (Progressing)       Start:  07/08/25    Expected End:  07/22/25               PT Transfers       STG - Transfer safely from bed to chair with Min A and 2WW. (Progressing)       Start:  07/08/25    Expected End:  07/22/25            STG - Patient to transfer to and from sit to supine using log roll method with Min A and elevated HOB. (Progressing)       Start:  07/08/25    Expected End:  07/22/25            STG - Patient will safely transfer sit to and from stand with Mod A and 2WW. (Progressing)       Start:  07/08/25    Expected End:  07/22/25               Safety       Pt will follow spinal precautions during all functional mobility, 100% of the time, without cueing. (Progressing)       Start:  07/08/25    Expected End:  07/22/25

## 2025-07-11 ENCOUNTER — NURSING HOME VISIT (OUTPATIENT)
Facility: EXTERNAL LOCATION | Age: 75
End: 2025-07-11
Payer: MEDICARE

## 2025-07-11 DIAGNOSIS — M47.12 CERVICAL SPONDYLOSIS WITH MYELOPATHY: Primary | ICD-10-CM

## 2025-07-11 DIAGNOSIS — G20.A1 PARKINSON'S DISEASE WITHOUT DYSKINESIA OR FLUCTUATING MANIFESTATIONS: ICD-10-CM

## 2025-07-11 DIAGNOSIS — K59.00 CONSTIPATION, UNSPECIFIED CONSTIPATION TYPE: ICD-10-CM

## 2025-07-11 PROCEDURE — 99310 SBSQ NF CARE HIGH MDM 45: CPT | Performed by: NURSE PRACTITIONER

## 2025-07-11 NOTE — CARE PLAN
The patient's goals for the shift include      The clinical goals for the shift include Patient will be safe and comfortable throughout shift    Problem: Skin  Goal: Decreased wound size/increased tissue granulation at next dressing change  Outcome: Met  Goal: Participates in plan/prevention/treatment measures  Outcome: Met  Goal: Prevent/manage excess moisture  Outcome: Met  Goal: Prevent/minimize sheer/friction injuries  Outcome: Met  Goal: Promote/optimize nutrition  Outcome: Met  Goal: Promote skin healing  Outcome: Met     Problem: Pain - Adult  Goal: Verbalizes/displays adequate comfort level or baseline comfort level  Outcome: Met     Problem: Safety - Adult  Goal: Free from fall injury  Outcome: Met     Problem: Chronic Conditions and Co-morbidities  Goal: Patient's chronic conditions and co-morbidity symptoms are monitored and maintained or improved  Outcome: Met     Problem: Discharge Planning  Goal: Discharge to home or other facility with appropriate resources  Outcome: Met

## 2025-07-12 ENCOUNTER — NURSING HOME VISIT (OUTPATIENT)
Dept: POST ACUTE CARE | Facility: EXTERNAL LOCATION | Age: 75
End: 2025-07-12
Payer: MEDICARE

## 2025-07-12 DIAGNOSIS — G62.9 NEUROPATHY: ICD-10-CM

## 2025-07-12 DIAGNOSIS — R53.1 WEAKNESS: ICD-10-CM

## 2025-07-12 DIAGNOSIS — F32.A DEPRESSION, UNSPECIFIED DEPRESSION TYPE: ICD-10-CM

## 2025-07-12 DIAGNOSIS — Z91.81 AT LOW RISK FOR FALL: ICD-10-CM

## 2025-07-12 DIAGNOSIS — G20.A1 PARKINSON'S DISEASE WITHOUT DYSKINESIA OR FLUCTUATING MANIFESTATIONS: ICD-10-CM

## 2025-07-12 DIAGNOSIS — Z98.890 H/O CERVICAL SPINE SURGERY: Primary | ICD-10-CM

## 2025-07-12 DIAGNOSIS — M19.90 OSTEOARTHRITIS, UNSPECIFIED OSTEOARTHRITIS TYPE, UNSPECIFIED SITE: ICD-10-CM

## 2025-07-12 DIAGNOSIS — I10 HYPERTENSION, UNSPECIFIED TYPE: ICD-10-CM

## 2025-07-12 DIAGNOSIS — N40.0 BENIGN PROSTATIC HYPERPLASIA, UNSPECIFIED WHETHER LOWER URINARY TRACT SYMPTOMS PRESENT: ICD-10-CM

## 2025-07-12 PROCEDURE — 99305 1ST NF CARE MODERATE MDM 35: CPT | Performed by: INTERNAL MEDICINE

## 2025-07-12 NOTE — LETTER
Patient: Edilson Cantu  : 1950    Encounter Date: 2025    PLACE OF SERVICE:  [facility]    This is new/initial history and physical.    Subjective  Patient ID: Edilson Cantu is a 74 y.o. male who presents for Initial visit.    Mr. Edilson Cantu is a 74-year-old male with history of cervical spinal stenosis with cervical spondylosis with myelopathy.  He has undergone cervical spine surgery and requires supportive care.    Review of Systems   Constitutional:  Negative for chills and fever.   Cardiovascular:  Negative for chest pain.   All other systems reviewed and are negative.    Objective  /78   Pulse 78   Temp 36.7 °C (98.1 °F)   Resp 16     Physical Exam  Vitals reviewed.   Constitutional:       Comments: This is a well-developed, well-nourished male, lying in bed, appearing weak and lethargic.   HENT:      Right Ear: Tympanic membrane, ear canal and external ear normal.      Left Ear: Tympanic membrane, ear canal and external ear normal.     Eyes:      General: No scleral icterus.     Pupils: Pupils are equal, round, and reactive to light.     Neck:      Vascular: No carotid bruit.      Comments: The patient is wearing a cervical collar  Cardiovascular:      Heart sounds: Normal heart sounds, S1 normal and S2 normal. No murmur heard.     No friction rub.   Pulmonary:      Effort: Pulmonary effort is normal.      Breath sounds: Normal breath sounds and air entry.   Abdominal:      Palpations: There is no hepatomegaly, splenomegaly or mass.     Musculoskeletal:         General: No swelling or deformity. Normal range of motion.      Cervical back: Neck supple.      Right lower leg: No edema.      Left lower leg: No edema.   Lymphadenopathy:      Cervical: No cervical adenopathy.      Upper Body:      Right upper body: No axillary adenopathy.      Left upper body: No axillary adenopathy.      Lower Body: No right inguinal adenopathy. No left inguinal adenopathy.      Neurological:      Mental Status: He is oriented to person, place, and time.      Cranial Nerves: Cranial nerves 2-12 are intact. No cranial nerve deficit.      Sensory: No sensory deficit.      Motor: Motor function is intact. No weakness.      Gait: Gait is intact.      Deep Tendon Reflexes: Reflexes normal.     Psychiatric:         Mood and Affect: Mood normal. Mood is not anxious or depressed. Affect is not angry.         Behavior: Behavior is not agitated.         Thought Content: Thought content normal.         Judgment: Judgment normal.     LAB WORK:  Laboratory studies were reviewed.    Assessment/Plan  Problem List Items Addressed This Visit           ICD-10-CM    HTN (hypertension) I10    BPH (benign prostatic hyperplasia) N40.0    Parkinson's disease G20.A1    Depression F32.A     Other Visit Diagnoses         Codes      H/O cervical spine surgery    -  Primary Z98.890      Neuropathy     G62.9      Osteoarthritis, unspecified osteoarthritis type, unspecified site     M19.90      Weakness     R53.1      At low risk for fall     Z91.81        1. Status post cervical spine surgery, on pain control, wearing cervical collar.  Follow with Neurosurgery.  2. Parkinson's disease, on carbidopa and levodopa.  3. Hypertension, medically controlled.  4. Neuropathy, on gabapentin.  5. Depression, on medication.  6. BPH, on tamsulosin.  7. Osteoarthritis, on Tylenol.  8. Weakness, on PT/OT.  9. Fall risk, on fall precautions.    Scribe Attestation  By signing my name below, IBessie Scribe attest that this documentation has been prepared under the direction and in the presence of Jigna Cabrera MD.     All medical record entries made by the scribe were personally dictated by me I have reviewed the chart and agree the record accurately reflects my personal performance of his history physical examination and management      Electronically Signed By: Jigna Cabrera MD   7/17/25 11:12 PM

## 2025-07-15 ENCOUNTER — NURSING HOME VISIT (OUTPATIENT)
Facility: EXTERNAL LOCATION | Age: 75
End: 2025-07-15
Payer: MEDICARE

## 2025-07-15 DIAGNOSIS — M47.12 CERVICAL SPONDYLOSIS WITH MYELOPATHY: Primary | ICD-10-CM

## 2025-07-15 DIAGNOSIS — G20.A1 PARKINSON'S DISEASE WITHOUT DYSKINESIA OR FLUCTUATING MANIFESTATIONS: ICD-10-CM

## 2025-07-15 DIAGNOSIS — K59.00 CONSTIPATION, UNSPECIFIED CONSTIPATION TYPE: ICD-10-CM

## 2025-07-15 DIAGNOSIS — Z98.890 H/O CERVICAL SPINE SURGERY: ICD-10-CM

## 2025-07-15 PROCEDURE — 99309 SBSQ NF CARE MODERATE MDM 30: CPT | Performed by: NURSE PRACTITIONER

## 2025-07-16 VITALS
SYSTOLIC BLOOD PRESSURE: 128 MMHG | HEART RATE: 78 BPM | RESPIRATION RATE: 16 BRPM | DIASTOLIC BLOOD PRESSURE: 78 MMHG | TEMPERATURE: 98.1 F

## 2025-07-16 ASSESSMENT — ENCOUNTER SYMPTOMS
FEVER: 0
CHILLS: 0

## 2025-07-16 NOTE — PROGRESS NOTES
*Provider Impression*    Patient is a 74 year old male who is seen today for management of multiple medical problems       #Cervical spondylosis - s/p C3-C6 cervical spine corpectomy; C2-C7 cervical fusion with allograft and anterior instrumentation; C2-T2 fusion with instrumentation on 7/7 by Dr. Arteaga; PT/OT, C-Collar, f/u w/ Dr. Arteaga; acetaminophen 1000mg q6h PRN, cyclobenzaprine 10mg q8h PRN, ketorolac 10mg q6h PRN, oxycodone 5mg q6h PRN, diclofenac 1% q6h PRN, Bactrim DS BID until 7/30  #Parkinson's - Sinemet 25/100mg 2 tabs TID  #Constipation - colace 100mg q8h PRN, miralax 17grams daily  #ACP - Full Code  Follow up as needed      *Chief Complaint*    cervical stenosis     *History of Present Illness*    Patient is a 73 y/o male w/ PMH as below who presented on 7/7/2025 for elective C3-C6 cervical spine corpectomy; C2-C7 cervical fusion with allograft and anterior instrumentation; C2-T2 fusion with instrumentation with Dr. Arteaga. Procedure went as planned and without complications and patient was transferred to PACU and in stable condition was transferred to the floor. Hospital stay was somewhat complicated by bouts of confusion on 7/9/2025 per the nursing staff.  On 7/10/2025, after holding narcotic and muscle relaxer medications, his cognition seem to have improved.  He had been working with PT and OT and improving. Once stable he was d/c to [facility] on 7/10.    He is seen sitting up in his room today and reports pain 2/10 at best, 4/10 at worst, same numbness, tingling, paresthesias, weakness, constipation, diarrhea, LUTS, bowel or bladder incontinence, CP, SOB, cough, n/v, or any other new c/o presently.     *Review of Systems*  All other systems reviewed are negative except as noted in the HPI     *Vital Signs*   Date: 7/11/25 - T: 98.2  P: 76  R: 20  BP: 130/72  SpO2: 96% on RA    *Physical Exam*  Gen: (+) NAD, (+) well-appearing  HEENT: (+) normocephalic, (+) MMM  Neck: (+) supple  Lungs:  (+) CTAB, (-) wheezes, (-) rales, (-) rhonchi  Heart: (+) RRR, (+) S1 S2, (-) murmurs  Pulses: (+) palpable  Abd: (+) soft, (+) NT, (+) ND, (+) BS+  Ext: (-) edema, (-) deformity  MSK: (-) joint swelling  Skin: (+) warm, (+) dry, (-) rash  Neuro: (+) follows commands, (-) tremor, (+) alert    *Results / Data*  Coags - Date:   INR:   PT:      Lab Results   Component Value Date    WBC 7.3 07/12/2025    WBC 8.3 07/10/2025    WBC 9.9 07/09/2025    HGB 11.1 (L) 07/12/2025    HGB 10.5 (L) 07/10/2025    HGB 11.4 (L) 07/09/2025    HCT 34.9 (L) 07/12/2025    HCT 32.9 (L) 07/10/2025    HCT 33.5 (L) 07/09/2025    MCV 94 07/12/2025     07/12/2025     07/10/2025     07/09/2025     Lab Results   Component Value Date     07/12/2025     07/10/2025     07/09/2025    K 4.4 07/12/2025    K 4.1 07/10/2025    K 3.8 07/09/2025    CO2 26 07/12/2025    CO2 25 07/10/2025    CO2 26 07/09/2025     07/12/2025     (H) 07/10/2025     (H) 07/09/2025    BUN 11 07/12/2025    BUN 16 07/10/2025    BUN 15 07/09/2025    CREATININE 0.98 07/12/2025    CREATININE 0.75 07/10/2025    CREATININE 0.75 07/09/2025    GLUCOSE 126 (H) 07/12/2025    GLUCOSE 86 07/10/2025    GLUCOSE 105 (H) 07/09/2025    CALCIUM 8.8 07/12/2025    CALCIUM 9.0 07/10/2025    CALCIUM 9.3 07/09/2025    AST 14 07/07/2025    AST 17 05/09/2024    AST 11 09/15/2023    ALT 10 07/07/2025    ALT 5 (L) 05/09/2024    ALT <3 (L) 09/15/2023    ALKPHOS 106 07/07/2025    ALKPHOS 136 05/09/2024    ALKPHOS 103 09/15/2023     Lab Results   Component Value Date    TSH 1.92 03/21/2023    TSH 3.48 04/09/2018      Lab Results   Component Value Date    HGBA1C 5.3 09/18/2019      Lab Results   Component Value Date    [id number] 231 09/18/2019      Lab Results   Component Value Date    FOLATE 20.3 09/18/2019      Lab Results   Component Value Date    CHOL 177 09/26/2024    CHOL 200 (H) 03/21/2023    CHOL 240 (H) 09/01/2020     Lab Results   Component  Value Date    HDL 63.7 09/26/2024    HDL 77.9 03/21/2023    HDL 82.7 09/01/2020     Lab Results   Component Value Date    LDLCALC 103 (H) 09/26/2024     Lab Results   Component Value Date    TRIG 51 09/26/2024    TRIG 40 03/21/2023    TRIG 79 09/01/2020       Allergies:   Allergies[1]  PMH:  Active Ambulatory Problems     Diagnosis Date Noted    Abdominal hernia 03/06/2023    HTN (hypertension) 03/06/2023    BPH (benign prostatic hyperplasia) 03/06/2023    Difficulty walking 03/06/2023    Knee osteoarthritis 03/06/2023    Foot drop, left foot 03/06/2023    Acute postoperative pain of right knee 03/06/2023    Arthritis 03/06/2023    Spinal stenosis, lumbosacral region 03/06/2023    Muscle weakness (generalized) 03/06/2023    Parkinson's disease 03/06/2023    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    Postlaminectomy syndrome, lumbar region 03/06/2023    Tremor 03/06/2023    Osteoarthritis of right knee, unspecified osteoarthritis type 10/01/2023    Depression 10/02/2023    Spinal stenosis in cervical region 06/09/2025    Cervical spondylosis with myelopathy 06/09/2025    Spinal cord compression (Multi) 06/09/2025     Resolved Ambulatory Problems     Diagnosis Date Noted    Acquired forefoot supination 03/06/2023    Acquired short Achilles tendon 03/06/2023    Bilateral edema of lower extremity 03/06/2023    Chronic low back pain 03/06/2023    Community acquired pneumonia 03/06/2023    Constipation, chronic 03/06/2023    Hallux rigidus 03/06/2023    Impacted cerumen of left ear 03/06/2023    Gait instability 03/06/2023    Impaired gait and mobility 03/06/2023    Primary localized osteoarthritis of right knee 03/06/2023    Left knee pain 03/06/2023    Lumbar degenerative disc disease 03/06/2023    Right lumbar radiculopathy 03/06/2023    Lumbar spondylosis 03/06/2023    Lumbar stenosis with neurogenic claudication 03/06/2023    Myofascial pain 03/06/2023    Osteoarthritis of right hip 03/06/2023    Peripheral neuropathy  03/06/2023    Peroneal tendonitis 03/06/2023    Right hip pain 03/06/2023    Right knee pain 03/06/2023    Rigidity (muscles) 03/06/2023    Sacroiliac joint pain 03/06/2023    Shuffling gait 03/06/2023    Stooped posture 03/06/2023    Vascular claudication 03/06/2023    Venous insufficiency 03/06/2023    Vitamin D deficiency 03/06/2023    Left hip pain 03/06/2023    Lumbar pain 03/06/2023     Past Medical History:   Diagnosis Date    Bronchitis     Parkinsons (Multi)     Scoliosis      PSH:  Surgical History[2]  FH:  Family History[3]  SocHx:   Social History[4]         [1] No Known Allergies  [2]   Past Surgical History:  Procedure Laterality Date    CARPAL TUNNEL RELEASE Left     KNEE ARTHROPLASTY Right     KNEE ARTHROSCOPY W/ DEBRIDEMENT  08/24/2017    Knee Arthroscopy (Therapeutic)    OTHER SURGICAL HISTORY  09/10/2019    Lower back surgery   [3]   Family History  Problem Relation Name Age of Onset    Other (Cardiac disorder) Father     [4]   Social History  Tobacco Use    Smoking status: Never     Passive exposure: Never    Smokeless tobacco: Never   Substance Use Topics    Alcohol use: Yes     Comment: daily bourbon    Drug use: Never     Comment: gummies occassional

## 2025-07-21 ENCOUNTER — PATIENT OUTREACH (OUTPATIENT)
Dept: PRIMARY CARE | Facility: CLINIC | Age: 75
End: 2025-07-21
Payer: MEDICARE

## 2025-07-21 NOTE — PROGRESS NOTES
Discharge Facility: [facility]  Discharge Diagnosis: SPINAL STENOSIS, CERVICAL REGION  Admission Date: 7/11/25  Discharge Date: 7/18/25    PCP Appointment Date: 7/24/25  Specialist Appointment Date: 7/25/25 ortho surg  Hospital Encounter and Summary Linked: No  See discharge assessment below for further details    Wrap Up  Wrap Up Additional Comments: Patient reports doing okay since return home, he is ambulating with a walker, family is available to assist, pain is well controlled without taking any pain medications. He denies any medication changes at SNF discharge. Encouraged follow up appointments as recommended. Patient denies any questions or concerns. Contact information provided. (7/21/2025  3:49 PM)    Medications  Medications reviewed with patient/caregiver?: Yes (7/21/2025  3:49 PM)  Is the patient having any side effects they believe may be caused by any medication additions or changes?: No (7/21/2025  3:49 PM)  Does the patient have all medications ordered at discharge?: Yes (7/21/2025  3:49 PM)  Prescription Comments: no new/changed medications at SNF discharge (7/21/2025  3:49 PM)  Medication Comments: denies any medication questions or concerns (7/21/2025  3:49 PM)    Appointments  Does the patient have a primary care provider?: Yes (7/21/2025  3:49 PM)  Care Management Interventions: Verified appointment date/time/provider (7/21/2025  3:49 PM)  Care Management Interventions: Advised patient to keep appointment (7/21/2025  3:49 PM)    Self Management  What is the home health agency?: [facility] (7/21/2025  3:49 PM)  Has home health visited the patient within 72 hours of discharge?: Yes (started PT today) (7/21/2025  3:49 PM)  What Durable Medical Equipment (DME) was ordered?: walker (7/21/2025  3:49 PM)    Patient Teaching  Does the patient have access to their discharge instructions?: Yes (7/21/2025  3:49 PM)  What is the patient's perception of their health status  since discharge?: Improving (7/21/2025  3:49 PM)

## 2025-07-21 NOTE — PROGRESS NOTES
*Provider Impression*    Patient is a 74 year old male who is seen today for management of multiple medical problems       #Cervical stenosis - s/p C3-C6 cervical spine corpectomy; C2-C7 cervical fusion with allograft and anterior instrumentation; C2-T2 fusion with instrumentation on 7/7 by Dr. Arteaga; PT/OT, C-Collar, f/u w/ Dr. Arteaga; acetaminophen 1000mg q6h PRN, cyclobenzaprine 10mg q8h PRN, ketorolac 10mg q6h PRN, oxycodone 5mg q6h PRN, diclofenac 1% q6h PRN, Bactrim DS BID until 7/30, f/u w/ Dr. Garcia on 7/25  #Parkinson's - Sinemet 25/100mg 2 tabs TID  #Constipation - colace 100mg q8h PRN, miralax 17grams daily  #ACP - Full Code  Follow up as needed      *Chief Complaint*     cervical stenosis    *History of Present Illness*    Patient is a 73 y/o male w/ PMH as below who presented on 7/7/2025 for elective C3-C6 cervical spine corpectomy; C2-C7 cervical fusion with allograft and anterior instrumentation; C2-T2 fusion with instrumentation with Dr. Arteaga. Procedure went as planned and without complications and patient was transferred to PACU and in stable condition was transferred to the floor. Hospital stay was somewhat complicated by bouts of confusion on 7/9/2025 per the nursing staff.  On 7/10/2025, after holding narcotic and muscle relaxer medications, his cognition seem to have improved.  He had been working with PT and OT and improving. Once stable he was d/c to [facility] on 7/10.    His labs appreciated as below.     He is seen sitting up in his room today pain is very manageable, up to 3/10 at worst, 0/10 at best, numbness, tinglnig, paresthesias about the same, no bowel or bladder incontinence, no f/c, sweats, CP, SOB, cough, n/v, constipation, diarrhea, LUTS, or any other new c/o presently.     *Review of Systems*  All other systems reviewed are negative except as noted in the HPI     *Vital Signs*   Date: 7/15/25 - T: 98.5  P: 73  R: 18  BP: 140/75  SpO2: 95% on RA    *Physical  Exam*  Gen: (+) NAD, (+) well-appearing  HEENT: (+) normocephalic, (+) MMM  Neck: (+) C-collar, incision c/d/i  Lungs: (+) CTAB, (-) wheezes, (-) rales, (-) rhonchi  Heart: (+) RRR, (+) S1 S2, (-) murmurs  Pulses: (+) palpable  Abd: (+) soft, (+) NT, (+) ND, (+) BS+  Ext: (-) edema, (-) deformity  MSK: (-) joint swelling  Skin: (+) warm, (+) dry, (-) rash  Neuro: (+) follows commands, (-) tremor, (+) alert    *Results / Data*  Coags - Date:   INR:   PT:      Lab Results   Component Value Date    WBC 7.3 07/12/2025    WBC 8.3 07/10/2025    WBC 9.9 07/09/2025    HGB 11.1 (L) 07/12/2025    HGB 10.5 (L) 07/10/2025    HGB 11.4 (L) 07/09/2025    HCT 34.9 (L) 07/12/2025    HCT 32.9 (L) 07/10/2025    HCT 33.5 (L) 07/09/2025    MCV 94 07/12/2025     07/12/2025     07/10/2025     07/09/2025     Lab Results   Component Value Date     07/12/2025     07/10/2025     07/09/2025    K 4.4 07/12/2025    K 4.1 07/10/2025    K 3.8 07/09/2025    CO2 26 07/12/2025    CO2 25 07/10/2025    CO2 26 07/09/2025     07/12/2025     (H) 07/10/2025     (H) 07/09/2025    BUN 11 07/12/2025    BUN 16 07/10/2025    BUN 15 07/09/2025    CREATININE 0.98 07/12/2025    CREATININE 0.75 07/10/2025    CREATININE 0.75 07/09/2025    GLUCOSE 126 (H) 07/12/2025    GLUCOSE 86 07/10/2025    GLUCOSE 105 (H) 07/09/2025    CALCIUM 8.8 07/12/2025    CALCIUM 9.0 07/10/2025    CALCIUM 9.3 07/09/2025    AST 14 07/07/2025    AST 17 05/09/2024    AST 11 09/15/2023    ALT 10 07/07/2025    ALT 5 (L) 05/09/2024    ALT <3 (L) 09/15/2023    ALKPHOS 106 07/07/2025    ALKPHOS 136 05/09/2024    ALKPHOS 103 09/15/2023     Lab Results   Component Value Date    TSH 1.92 03/21/2023    TSH 3.48 04/09/2018      Lab Results   Component Value Date    HGBA1C 5.3 09/18/2019      Lab Results   Component Value Date    [id number] 231 09/18/2019      Lab Results   Component Value Date    FOLATE 20.3 09/18/2019      Lab Results   Component  Value Date    CHOL 177 09/26/2024    CHOL 200 (H) 03/21/2023    CHOL 240 (H) 09/01/2020     Lab Results   Component Value Date    HDL 63.7 09/26/2024    HDL 77.9 03/21/2023    HDL 82.7 09/01/2020     Lab Results   Component Value Date    LDLCALC 103 (H) 09/26/2024     Lab Results   Component Value Date    TRIG 51 09/26/2024    TRIG 40 03/21/2023    TRIG 79 09/01/2020       Allergies:   Allergies[1]  PMH:  Active Ambulatory Problems     Diagnosis Date Noted    Abdominal hernia 03/06/2023    HTN (hypertension) 03/06/2023    BPH (benign prostatic hyperplasia) 03/06/2023    Difficulty walking 03/06/2023    Knee osteoarthritis 03/06/2023    Foot drop, left foot 03/06/2023    Acute postoperative pain of right knee 03/06/2023    Arthritis 03/06/2023    Spinal stenosis, lumbosacral region 03/06/2023    Muscle weakness (generalized) 03/06/2023    Parkinson's disease 03/06/2023    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    Postlaminectomy syndrome, lumbar region 03/06/2023    Tremor 03/06/2023    Osteoarthritis of right knee, unspecified osteoarthritis type 10/01/2023    Depression 10/02/2023    Spinal stenosis in cervical region 06/09/2025    Cervical spondylosis with myelopathy 06/09/2025    Spinal cord compression (Multi) 06/09/2025     Resolved Ambulatory Problems     Diagnosis Date Noted    Acquired forefoot supination 03/06/2023    Acquired short Achilles tendon 03/06/2023    Bilateral edema of lower extremity 03/06/2023    Chronic low back pain 03/06/2023    Community acquired pneumonia 03/06/2023    Constipation, chronic 03/06/2023    Hallux rigidus 03/06/2023    Impacted cerumen of left ear 03/06/2023    Gait instability 03/06/2023    Impaired gait and mobility 03/06/2023    Primary localized osteoarthritis of right knee 03/06/2023    Left knee pain 03/06/2023    Lumbar degenerative disc disease 03/06/2023    Right lumbar radiculopathy 03/06/2023    Lumbar spondylosis 03/06/2023    Lumbar stenosis with neurogenic  claudication 03/06/2023    Myofascial pain 03/06/2023    Osteoarthritis of right hip 03/06/2023    Peripheral neuropathy 03/06/2023    Peroneal tendonitis 03/06/2023    Right hip pain 03/06/2023    Right knee pain 03/06/2023    Rigidity (muscles) 03/06/2023    Sacroiliac joint pain 03/06/2023    Shuffling gait 03/06/2023    Stooped posture 03/06/2023    Vascular claudication 03/06/2023    Venous insufficiency 03/06/2023    Vitamin D deficiency 03/06/2023    Left hip pain 03/06/2023    Lumbar pain 03/06/2023     Past Medical History:   Diagnosis Date    At high risk for falls     Bronchitis     Hypertension     Neuropathy     Osteoarthritis     Parkinsons (Multi)     Scoliosis     Spinal stenosis     Weakness      PSH:  Surgical History[2]  FH:  Family History[3]  SocHx:   Social History[4]         [1] No Known Allergies  [2]   Past Surgical History:  Procedure Laterality Date    CARPAL TUNNEL RELEASE Left     KNEE ARTHROPLASTY Right     KNEE ARTHROSCOPY W/ DEBRIDEMENT  08/24/2017    Knee Arthroscopy (Therapeutic)    OTHER SURGICAL HISTORY  09/10/2019    Lower back surgery   [3]   Family History  Problem Relation Name Age of Onset    Other (Cardiac disorder) Father      Hypertension Other     [4]   Social History  Tobacco Use    Smoking status: Never     Passive exposure: Never    Smokeless tobacco: Never   Substance Use Topics    Alcohol use: Yes     Comment: daily bourbon    Drug use: Never     Comment: gummies occassional

## 2025-07-24 ENCOUNTER — APPOINTMENT (OUTPATIENT)
Dept: PRIMARY CARE | Facility: CLINIC | Age: 75
End: 2025-07-24
Payer: MEDICARE

## 2025-07-24 ENCOUNTER — TRANSCRIBE ORDERS (OUTPATIENT)
Dept: ORTHOPEDIC SURGERY | Facility: HOSPITAL | Age: 75
End: 2025-07-24

## 2025-07-24 DIAGNOSIS — Z98.1 STATUS POST CERVICAL SPINAL FUSION: ICD-10-CM

## 2025-07-25 ENCOUNTER — APPOINTMENT (OUTPATIENT)
Dept: ORTHOPEDIC SURGERY | Facility: CLINIC | Age: 75
End: 2025-07-25
Payer: MEDICARE

## 2025-07-25 ENCOUNTER — HOSPITAL ENCOUNTER (OUTPATIENT)
Dept: RADIOLOGY | Facility: CLINIC | Age: 75
Discharge: HOME | End: 2025-07-25
Payer: MEDICARE

## 2025-07-25 DIAGNOSIS — Z98.1 STATUS POST CERVICAL SPINAL FUSION: ICD-10-CM

## 2025-07-25 DIAGNOSIS — R21 RASH: Primary | ICD-10-CM

## 2025-07-25 PROCEDURE — 1111F DSCHRG MED/CURRENT MED MERGE: CPT

## 2025-07-25 PROCEDURE — 72040 X-RAY EXAM NECK SPINE 2-3 VW: CPT

## 2025-07-25 PROCEDURE — 1159F MED LIST DOCD IN RCRD: CPT

## 2025-07-25 PROCEDURE — 99024 POSTOP FOLLOW-UP VISIT: CPT

## 2025-07-25 NOTE — PROGRESS NOTES
HPI:  Edilson Cantu is a 74 y.o. year-old male who presents to the office for initial postoperative visit for C3-C6 cervical spine corpectomy; C2-C7 cervical fusion with allograft and anterior instrumentation; C2-T2 fusion with instrumentation with Dr. Arteaga on 7/7/2025.    ROS:  Reviewed on EMR and patient intake sheet.    PMH/SH:  Reviewed on EMR and patient intake sheet.    Exam:  MSK: ***  General: No acute distress. Awake and conversant.  Eyes: Normal conjunctiva, anicteric. Round symmetric pupils.  ENT: Hearing grossly intact. No nasal discharge.  Neck: Neck is supple. No masses or thyromegaly.  Respiratory: Respirations are non-labored. No wheezing.  Skin: Warm. No rashes or ulcers.  Psych: Alert and oriented. Cooperative, appropriate mood and affect, normal judgement.  CV: No lower extremity edema.  Neuro: Sensation and CN II-XII grossly normal.    Radiology:     Xrays of cervical spine personally reviewed and demonstrate allograft spacer and anterior plate seated appropriately.  Posterior cervical fusion intact.  Alignment within normal limits.    Diagnosis:    Status post cervical fusion    Assessment and Plan:  ***     concerning post-operative pain. I educated the patient on normal return to activity over the next few months. They should continue to limit excessive flexion and extension of the neck, as well as limiting overhead movements as this may aggravate the trapezius and paraspinal muscles. I educated the patient on continuing walking and limiting heavy lifting. They should continue limiting the use of NSAIDs for 2 more months this may inhibit the efficacy of the fusion. Patient will follow up in 1 year for final images, or sooner if needed. Patient agrees to the plan above.    Benny Garcia PA-C  Department of Orthopaedic Surgery  5:02 PM  08/18/25    [address] [place] [address]    Voicemail: [phone]   Appts: [phone]  Fax: [phone]

## 2025-07-29 ENCOUNTER — APPOINTMENT (OUTPATIENT)
Dept: PRIMARY CARE | Facility: CLINIC | Age: 75
End: 2025-07-29
Payer: MEDICARE

## 2025-07-29 DIAGNOSIS — M48.02 SPINAL STENOSIS IN CERVICAL REGION: Primary | ICD-10-CM

## 2025-07-29 DIAGNOSIS — G95.20 SPINAL CORD COMPRESSION (MULTI): ICD-10-CM

## 2025-07-29 PROCEDURE — 99213 OFFICE O/P EST LOW 20 MIN: CPT | Performed by: INTERNAL MEDICINE

## 2025-07-29 PROCEDURE — 1159F MED LIST DOCD IN RCRD: CPT | Performed by: INTERNAL MEDICINE

## 2025-07-29 PROCEDURE — G2211 COMPLEX E/M VISIT ADD ON: HCPCS | Performed by: INTERNAL MEDICINE

## 2025-07-29 PROCEDURE — 1036F TOBACCO NON-USER: CPT | Performed by: INTERNAL MEDICINE

## 2025-07-29 PROCEDURE — 1111F DSCHRG MED/CURRENT MED MERGE: CPT | Performed by: INTERNAL MEDICINE

## 2025-07-29 PROCEDURE — 1160F RVW MEDS BY RX/DR IN RCRD: CPT | Performed by: INTERNAL MEDICINE

## 2025-07-29 NOTE — PROGRESS NOTES
Subjective   Patient ID: Edilson Cantu is a 74 y.o. male who presents for No chief complaint on file..    Virtual or Telephone Consent    An interactive audio and video telecommunication system which permits real time communications between the patient (at the originating site) and provider (at the distant site) was utilized to provide this telehealth service.   Verbal consent was requested and obtained from Edilson Cantu on this date, 07/29/25 for a telehealth visit and the patient's location was confirmed at the time of the visit.        HPI     Patient is a 74-year-old male with past medical history of hypertension BPH depression Parkinson disease who presents for follow-up from recent hospitalization.  Patient recently diagnosed with spinal canal stenosis of the cervical spine that was deemed severe.  Patient was admitted to the hospital for intervention due to evidence of myelopathy.  He had a C2-C7 cervical fusion with allograft and anterior instrumentation.  Subsequently discharged with home physical therapy and Occupational Therapy.  He states that the only amount of occupational therapy and physical therapy was 6 sessions.  He is somewhat concerned given poor improvement following surgery.  He still finds it difficult to get up from a seated position as well as move around the house.  He is concerned because he feels like he needs more physical therapy.  He has some weakness of the upper extremities as well as lower extremities.  Due to limitations of telehealth difficult to provide any physical exam.    Review of Systems:  Constitutional: No fever or chills  Cardiovascular: no chest pain, no palpitations and no syncope.   Respiratory: no cough, no shortness of breath during exertion and no shortness of breath at rest.   Gastrointestinal: no abdominal pain, no nausea and no vomiting.  Neuro: Sitting in a chair.    Physical Exam:   Constitutional: Alert and in no acute distress. Well developed, well  nourished.   Head and Face: Head and face: Normal.     Eyes: Normal external exam.    Ears, Nose, Mouth, and Throat: External inspection of ears and nose: Normal.  Hearing: Normal.   Neck: No neck mass was observed. Supple.  Pulmonary: No respiratory distress.    Musculoskeletal: Range of motion: Normal.     Skin: Normal skin color and pigmentation, normal skin turgor, and no rash.    Neurologic: Coordination: Normal.    Psychiatric: Judgment and insight: Intact. Mood and affect: Normal.    Assessment/Plan   Problem List Items Addressed This Visit           ICD-10-CM    Spinal stenosis in cervical region - Primary M48.02    Status post fusion.  Would benefit from home physical therapy and Occupational Therapy.  Will order [facility] physical therapy and Occupational Therapy.  Continue with carbidopa-levodopa per neurology.    Follow-up for regularly scheduled appointments         Spinal cord compression (Multi) G95.20       Given cervical canal stenosis with cervical myelopathy and weakness of the upper and lower extremities status post decompression would benefit from frequent physical therapy and occupational therapy sessions to improve mobility and functionality of both the upper and lower extremities.      This Medical recommendation has been made based on the Telephonic/Video conversation and the history is given by the patient, which I as a Physician and the patient also understand that there are limitations given the lack of an in-person Physical Exam. Patient will call back if symptoms don't improve.        A Doctor is always available by phone when the office is closed. Please feel free to call for help with any problem that you feel shouldn't wait until the office re-opens.     Juliano Weinstein, DO

## 2025-07-29 NOTE — ASSESSMENT & PLAN NOTE
Status post fusion.  Would benefit from home physical therapy and Occupational Therapy.  Will order [facility] physical therapy and Occupational Therapy.  Continue with carbidopa-levodopa per neurology.    Follow-up for regularly scheduled appointments

## 2025-08-01 ENCOUNTER — APPOINTMENT (OUTPATIENT)
Dept: ORTHOPEDIC SURGERY | Facility: CLINIC | Age: 75
End: 2025-08-01
Payer: MEDICARE

## 2025-08-06 ENCOUNTER — PATIENT OUTREACH (OUTPATIENT)
Dept: PRIMARY CARE | Facility: CLINIC | Age: 75
End: 2025-08-06
Payer: MEDICARE

## 2025-08-06 NOTE — PROGRESS NOTES
Confirmation of at least 2 patient identifiers.    Completed telephonic follow-up with patient after recent visit with Dr Weinstein    Spoke to patient during outreach call.    Patient reports feeling: No change from previous encounter  Patient continues to have weakness and soreness related to surgery. He states he had follow up appt where it they said this is expected. 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 Wife is available to assist him as needed.    Patient has questions or concerns about medications: No    Have all prescribed medications been filled? Yes    Patient has necessary resources to manage their care? Yes    Patient has questions or concerns? No    Next care management follow-up approximately within one month.  Patient reports still having contact information for this RN.

## 2025-08-26 ENCOUNTER — APPOINTMENT (OUTPATIENT)
Dept: DERMATOLOGY | Facility: CLINIC | Age: 75
End: 2025-08-26
Payer: MEDICARE

## 2026-01-29 ENCOUNTER — APPOINTMENT (OUTPATIENT)
Dept: DERMATOLOGY | Facility: CLINIC | Age: 76
End: 2026-01-29
Payer: MEDICARE

## 2026-02-17 ENCOUNTER — APPOINTMENT (OUTPATIENT)
Dept: DERMATOLOGY | Facility: CLINIC | Age: 76
End: 2026-02-17
Payer: MEDICARE

## (undated) DEVICE — SUTURE, ETHIBOND, 5, 30 IN, V40, MULTIPACK, GREEN

## (undated) DEVICE — SYRINGE, 30 CC, LUER LOCK

## (undated) DEVICE — WOUND SYSTEM, DEBRIDEMENT & CLEANING, O.R DUOPAK

## (undated) DEVICE — SPONGE, HEMOSTATIC, GELATIN, SURGIFOAM, 2 X 6 CM X 7 MM

## (undated) DEVICE — SKIN CLOSURE SYS, PREMIERPRO EXOFIN, 1-4CM X 22CM, 1.75G TUBE

## (undated) DEVICE — Device

## (undated) DEVICE — DRILL BIT, 2.4MM, SS

## (undated) DEVICE — DRAIN, WOUND, FLAT, HUBLESS, FULL LENGTH PERFORATION, 7 MM X 20 CM

## (undated) DEVICE — DRILL, NEURO, PRECISION, 3MM X 3.8MM, CARBIDE

## (undated) DEVICE — DIFFUSER, MAESTRO, CORE

## (undated) DEVICE — PIN, SKULL, DORO 3/PK

## (undated) DEVICE — SUTURE, PDS II, 2-0, 27 IN, SH, VIOLET

## (undated) DEVICE — TIP, SUCTION, YANKAUER, FLEXIBLE

## (undated) DEVICE — SPONGE, NEURO, 1 X 1 IN, STERILE

## (undated) DEVICE — SUTURE, VICRYL, 1, 24 IN, CTD, UNDYED

## (undated) DEVICE — KIT, PATIENT CARE, JACKSON TABLE W/PRONE-SAFE HEADREST

## (undated) DEVICE — TUBING, SUCTION, NON-CONDUCTIVE, W/CONNECT,.25 IN X 12 FT, STERILE, LF

## (undated) DEVICE — HIGH FLOW TIP FOR INTERPULSE HANDPIECE SET

## (undated) DEVICE — SYRINGE, 60 CC, LUER LOCK, MONOJECT, W/O CAP, LF

## (undated) DEVICE — STAPLER, SKIN PROXIMATE, 35 WIDE

## (undated) DEVICE — DRAPE COVER, C ARM, FLOUROSCAN IMAGING SYS

## (undated) DEVICE — CARTRIDGE, MAESTRO OIL, CORE

## (undated) DEVICE — SUTURE, VICRYL, 1, 36 IN, CTX, VIOLET

## (undated) DEVICE — DRAPE PACK, TOTAL KNEE, CUSTOM, GEAUGA

## (undated) DEVICE — SUTURE, MONOCRYL, 3-0, 18 IN, PS2, UNDYED

## (undated) DEVICE — DRAPE, INCISE, ANTIMICROBIAL, IOBAN 2, STERI DRAPE, 23 X 33 IN, DISPOSABLE, STERILE

## (undated) DEVICE — NEEDLE, SPINAL, QUINCKE, 18 G X 3.5 IN, PINK HUB

## (undated) DEVICE — GOWN, SURGICAL, SMARTGOWN, XLARGE, STERILE

## (undated) DEVICE — DRAPE, SHEET, 17 X 23 IN

## (undated) DEVICE — SUTURE, SILK, 2-0, 18 IN, BLACK

## (undated) DEVICE — DRAPE, INSTRUMENT, W/POUCH, STERI DRAPE, 7 X 11 IN, DISPOSABLE, STERILE

## (undated) DEVICE — EVACUATOR, WOUND, SUCTION, CLOSED, JACKSON-PRATT, 100 CC, SILICONE

## (undated) DEVICE — DRAPE, MICROSCOPE, FOR ZEISS 65MM, VARI-LENS II, 52 X 150

## (undated) DEVICE — COLLAR, CERVICAL, UNIVERSAL, 3 IN

## (undated) DEVICE — BANDAGE, ELASTIC,  6 IN X 11 YDS, STERILE, LF

## (undated) DEVICE — COVER, C-ARM W/CLIPS, OEC GE

## (undated) DEVICE — INTERPULSE HANDPIECE SET W/ 10FT SUCTION TUBING

## (undated) DEVICE — CATHETER TRAY, SURESTEP, 14FR, PRECONNECTED DRAIN BAG

## (undated) DEVICE — PAD, KNEE PATIENT, DEMAYO, DISP, STERILE

## (undated) DEVICE — CAUTERY, PENCIL, PUSH BUTTON, SMOKE EVAC, 70MM

## (undated) DEVICE — SOLUTION, IRRIGATION, USP, SODIUM CHLORIDE 0.9%, 3000 ML, BAG

## (undated) DEVICE — KIT, MINOR, DOUBLE BASIN

## (undated) DEVICE — TOWEL, SURGICAL, NEURO, O/R, 16 X 26, BLUE, STERILE

## (undated) DEVICE — SPONGE, DISSECTOR, PEANUT, 3/8, STERILE 5 FOAM HOLDER"

## (undated) DEVICE — DRILL, 11MM

## (undated) DEVICE — CONTAINER, SPECIMEN, 120 ML, STERILE

## (undated) DEVICE — STRIP, SKIN CLOSURE, STERI STRIP, REINFORCED, 0.5 X 4 IN

## (undated) DEVICE — COVER, TABLE, 44X90

## (undated) DEVICE — CORD, CAUTERY, BIOPOLAR FORCEP, 12FT

## (undated) DEVICE — DISTRACTION PIN, 14MM, STERILE

## (undated) DEVICE — APPLICATOR, CHLORAPREP, W/ORANGE TINT, 26ML

## (undated) DEVICE — GLOVE, SURGICAL, PROTEXIS PI ORTHO, 8.0, PF, LF

## (undated) DEVICE — SUTURE, PDS II, 0, 27 IN, CT1, VIOLET

## (undated) DEVICE — GLOVE, SURGICAL, PROTEXIS PI MICRO, 7.5, PF, LF

## (undated) DEVICE — PREP TRAY, VAGINAL

## (undated) DEVICE — SUTURE, ETHILON, 3-0, 18 IN, PS2, BLACK

## (undated) DEVICE — HOOD, STERISHIELD T4 SYSTEM

## (undated) DEVICE — IRRIGATION SYSTEM, WOUND, SURGIPHOR, 450ML, STERILE

## (undated) DEVICE — DRESSING, GAUZE, SPONGE, KERLIX, SUPER, 6 X 6.75 IN, STERILE 10PK

## (undated) DEVICE — SUTURE, CTD, VICRYL, 2-0, UND, BR, CT-2